# Patient Record
Sex: FEMALE | Race: WHITE | NOT HISPANIC OR LATINO | Employment: UNEMPLOYED | URBAN - METROPOLITAN AREA
[De-identification: names, ages, dates, MRNs, and addresses within clinical notes are randomized per-mention and may not be internally consistent; named-entity substitution may affect disease eponyms.]

---

## 2017-06-07 ENCOUNTER — HOSPITAL ENCOUNTER (EMERGENCY)
Facility: MEDICAL CENTER | Age: 22
End: 2017-06-07
Attending: EMERGENCY MEDICINE
Payer: MEDICAID

## 2017-06-07 VITALS
RESPIRATION RATE: 16 BRPM | TEMPERATURE: 98.4 F | SYSTOLIC BLOOD PRESSURE: 99 MMHG | BODY MASS INDEX: 20.62 KG/M2 | DIASTOLIC BLOOD PRESSURE: 62 MMHG | OXYGEN SATURATION: 96 % | WEIGHT: 123.9 LBS | HEART RATE: 68 BPM

## 2017-06-07 DIAGNOSIS — Z86.59 HISTORY OF DEPRESSION: ICD-10-CM

## 2017-06-07 DIAGNOSIS — S01.81XA FOREHEAD LACERATION, INITIAL ENCOUNTER: ICD-10-CM

## 2017-06-07 PROCEDURE — 99283 EMERGENCY DEPT VISIT LOW MDM: CPT

## 2017-06-07 PROCEDURE — 303353 HCHG DERMABOND SKIN ADHESIVE

## 2017-06-07 PROCEDURE — 304999 HCHG REPAIR-SIMPLE/INTERMED LEVEL 1

## 2017-06-07 ASSESSMENT — LIFESTYLE VARIABLES
ON A TYPICAL DAY WHEN YOU DRINK ALCOHOL HOW MANY DRINKS DO YOU HAVE: 2
EVER FELT BAD OR GUILTY ABOUT YOUR DRINKING: NO
EVER HAD A DRINK FIRST THING IN THE MORNING TO STEADY YOUR NERVES TO GET RID OF A HANGOVER: NO
TOTAL SCORE: 0
HAVE PEOPLE ANNOYED YOU BY CRITICIZING YOUR DRINKING: NO
TOTAL SCORE: 0
CONSUMPTION TOTAL: NEGATIVE
DO YOU DRINK ALCOHOL: YES
HAVE YOU EVER FELT YOU SHOULD CUT DOWN ON YOUR DRINKING: NO
AVERAGE NUMBER OF DAYS PER WEEK YOU HAVE A DRINK CONTAINING ALCOHOL: 2
HOW MANY TIMES IN THE PAST YEAR HAVE YOU HAD 5 OR MORE DRINKS IN A DAY: 0
TOTAL SCORE: 0

## 2017-06-07 ASSESSMENT — PAIN SCALES - GENERAL
PAINLEVEL_OUTOF10: 7
PAINLEVEL_OUTOF10: 7

## 2017-06-07 NOTE — ED AVS SNAPSHOT
Home Care Instructions                                                                                                                Zeina Cruz   MRN: [id number]    Department:  [facility], Emergency Dept   Date of Visit:  6/7/2017            [facility], Emergency Dept    [address]    [place] [address]    Phone:  [phone]      You were seen by     Clemente Herron M.D.      Your Diagnosis Was     Forehead laceration, initial encounter     S01.81XA       Follow-up Information     1. Follow up with SCOTTY Saha.    Specialty:  Family Medicine    Contact information    [address] [place] [address] [address]  [facility] [address] [phone]        Medication Information     Review all of your home medications and newly ordered medications with your primary doctor and/or pharmacist as soon as possible. Follow medication instructions as directed by your doctor and/or pharmacist.     Please keep your complete medication list with you and share with your physician. Update the information when medications are discontinued, doses are changed, or new medications (including over-the-counter products) are added; and carry medication information at all times in the event of emergency situations.               Medication List      Notice     You have not been prescribed any medications.              Discharge Instructions       Facial Laceration   A facial laceration is a cut on the face. These injuries can be painful and cause bleeding. Lacerations usually heal quickly, but they need special care to reduce scarring.  DIAGNOSIS   Your health care provider will take a medical history, ask for details about how the injury occurred, and examine the wound to determine how deep the cut is.  TREATMENT   Some facial lacerations may not require closure. Others may not be able to be closed because of an increased risk of infection. The risk of infection and the chance for successful  closure will depend on various factors, including the amount of time since the injury occurred.  The wound may be cleaned to help prevent infection. If closure is appropriate, pain medicines may be given if needed. Your health care provider will use stitches (sutures), wound glue (adhesive), or skin adhesive strips to repair the laceration. These tools bring the skin edges together to allow for faster healing and a better cosmetic outcome. If needed, you may also be given a tetanus shot.  HOME CARE INSTRUCTIONS  · Only take over-the-counter or prescription medicines as directed by your health care provider.  · Follow your health care provider's instructions for wound care. These instructions will vary depending on the technique used for closing the wound.  For Sutures:  · Keep the wound clean and dry.    · If you were given a bandage (dressing), you should change it at least once a day. Also change the dressing if it becomes wet or dirty, or as directed by your health care provider.    · Wash the wound with soap and water 2 times a day. Rinse the wound off with water to remove all soap. Pat the wound dry with a clean towel.    · After cleaning, apply a thin layer of the antibiotic ointment recommended by your health care provider. This will help prevent infection and keep the dressing from sticking.    · You may shower as usual after the first 24 hours. Do not soak the wound in water until the sutures are removed.    · Get your sutures removed as directed by your health care provider. With facial lacerations, sutures should usually be taken out after 4-5 days to avoid stitch marks.    · Wait a few days after your sutures are removed before applying any makeup.  For Skin Adhesive Strips:  · Keep the wound clean and dry.    · Do not get the skin adhesive strips wet. You may bathe carefully, using caution to keep the wound dry.    · If the wound gets wet, pat it dry with a clean towel.    · Skin adhesive strips will  fall off on their own. You may trim the strips as the wound heals. Do not remove skin adhesive strips that are still stuck to the wound. They will fall off in time.    For Wound Adhesive:  · You may briefly wet your wound in the shower or bath. Do not soak or scrub the wound. Do not swim. Avoid periods of heavy sweating until the skin adhesive has fallen off on its own. After showering or bathing, gently pat the wound dry with a clean towel.    · Do not apply liquid medicine, cream medicine, ointment medicine, or makeup to your wound while the skin adhesive is in place. This may loosen the film before your wound is healed.    · If a dressing is placed over the wound, be careful not to apply tape directly over the skin adhesive. This may cause the adhesive to be pulled off before the wound is healed.    · Avoid prolonged exposure to sunlight or tanning lamps while the skin adhesive is in place.  · The skin adhesive will usually remain in place for 5-10 days, then naturally fall off the skin. Do not pick at the adhesive film.    After Healing:  Once the wound has healed, cover the wound with sunscreen during the day for 1 full year. This can help minimize scarring. Exposure to ultraviolet light in the first year will darken the scar. It can take 1-2 years for the scar to lose its redness and to heal completely.   SEEK MEDICAL CARE IF:  · You have a fever.  SEEK IMMEDIATE MEDICAL CARE IF:  · You have redness, pain, or swelling around the wound.    · You see a yellowish-white fluid (pus) coming from the wound.       This information is not intended to replace advice given to you by your health care provider. Make sure you discuss any questions you have with your health care provider.     Document Released: 01/25/2006 Document Revised: 01/08/2016 Document Reviewed: 07/31/2014  Elsevier Interactive Patient Education ©2016 [employer]vier Inc[employer]            Patient Information     Patient Information    Following emergency  treatment: all patient requiring follow-up care must return either to a private physician or a clinic if your condition worsens before you are able to obtain further medical attention, please return to the emergency room.     Billing Information    At [facility], we work to make the billing process streamlined for our patients.  Our Representatives are here to answer any questions you may have regarding your hospital bill.  If you have insurance coverage and have supplied your insurance information to us, we will submit a claim to your insurer on your behalf.  Should you have any questions regarding your bill, we can be reached online or by phone as follows:  Online: You are able pay your bills online or live chat with our representatives about any billing questions you may have. We are here to help Monday - Friday from 8:00am to 7:30pm and 9:00am - 12:00pm on Saturdays.  Please visit [email or web address]  for more information.   Phone:  [phone] or [phone]    Please note that your emergency physician, surgeon, pathologist, radiologist, anesthesiologist, and other specialists are not employed by [facility] and will therefore bill separately for their services.  Please contact them directly for any questions concerning their bills at the numbers below:     Emergency Physician Services:  [phone]  [facility]:  [phone]  [facility]:  [phone]  [facility]:  [phone]    1. Your final bill may vary from the amount quoted upon discharge if all procedures are not complete at that time, or if your doctor has additional procedures of which we are not aware. You will receive an additional bill if you return to the Emergency Department at [facility] for suture removal regardless of the facility of which the sutures were placed.     2. Please arrange for settlement of this account at the emergency  registration.    3. All self-pay accounts are due in full at the time of treatment.  If you are unable to meet this obligation then payment is expected within 4-5 days.     4. If you have had radiology studies (CT, X-ray, Ultrasound, MRI), you have received a preliminary result during your emergency department visit. Please contact the radiology department [phone] to receive a copy of your final result. Please discuss the Final result with your primary physician or with the follow up physician provided.     Crisis Hotline:  [place] Crisis Hotline:  0-077-OQGJULF or 1-180.708.7905  [place] Crisis Hotline:    1-684.769.7624 or 649-648-6589         ED Discharge Follow Up Questions    1. In order to provide you with very good care, we would like to follow up with a phone call in the next few days.  May we have your permission to contact you?     YES /  NO    2. What is the best phone number to call you? (       )_____-__________    3. What is the best time to call you?      Morning  /  Afternoon  /  Evening                   Patient Signature:  ____________________________________________________________    Date:  ____________________________________________________________

## 2017-06-07 NOTE — DISCHARGE INSTRUCTIONS
Facial Laceration   A facial laceration is a cut on the face. These injuries can be painful and cause bleeding. Lacerations usually heal quickly, but they need special care to reduce scarring.  DIAGNOSIS   Your health care provider will take a medical history, ask for details about how the injury occurred, and examine the wound to determine how deep the cut is.  TREATMENT   Some facial lacerations may not require closure. Others may not be able to be closed because of an increased risk of infection. The risk of infection and the chance for successful closure will depend on various factors, including the amount of time since the injury occurred.  The wound may be cleaned to help prevent infection. If closure is appropriate, pain medicines may be given if needed. Your health care provider will use stitches (sutures), wound glue (adhesive), or skin adhesive strips to repair the laceration. These tools bring the skin edges together to allow for faster healing and a better cosmetic outcome. If needed, you may also be given a tetanus shot.  HOME CARE INSTRUCTIONS  · Only take over-the-counter or prescription medicines as directed by your health care provider.  · Follow your health care provider's instructions for wound care. These instructions will vary depending on the technique used for closing the wound.  For Sutures:  · Keep the wound clean and dry.    · If you were given a bandage (dressing), you should change it at least once a day. Also change the dressing if it becomes wet or dirty, or as directed by your health care provider.    · Wash the wound with soap and water 2 times a day. Rinse the wound off with water to remove all soap. Pat the wound dry with a clean towel.    · After cleaning, apply a thin layer of the antibiotic ointment recommended by your health care provider. This will help prevent infection and keep the dressing from sticking.    · You may shower as usual after the first 24 hours. Do not soak the  wound in water until the sutures are removed.    · Get your sutures removed as directed by your health care provider. With facial lacerations, sutures should usually be taken out after 4-5 days to avoid stitch marks.    · Wait a few days after your sutures are removed before applying any makeup.  For Skin Adhesive Strips:  · Keep the wound clean and dry.    · Do not get the skin adhesive strips wet. You may bathe carefully, using caution to keep the wound dry.    · If the wound gets wet, pat it dry with a clean towel.    · Skin adhesive strips will fall off on their own. You may trim the strips as the wound heals. Do not remove skin adhesive strips that are still stuck to the wound. They will fall off in time.    For Wound Adhesive:  · You may briefly wet your wound in the shower or bath. Do not soak or scrub the wound. Do not swim. Avoid periods of heavy sweating until the skin adhesive has fallen off on its own. After showering or bathing, gently pat the wound dry with a clean towel.    · Do not apply liquid medicine, cream medicine, ointment medicine, or makeup to your wound while the skin adhesive is in place. This may loosen the film before your wound is healed.    · If a dressing is placed over the wound, be careful not to apply tape directly over the skin adhesive. This may cause the adhesive to be pulled off before the wound is healed.    · Avoid prolonged exposure to sunlight or tanning lamps while the skin adhesive is in place.  · The skin adhesive will usually remain in place for 5-10 days, then naturally fall off the skin. Do not pick at the adhesive film.    After Healing:  Once the wound has healed, cover the wound with sunscreen during the day for 1 full year. This can help minimize scarring. Exposure to ultraviolet light in the first year will darken the scar. It can take 1-2 years for the scar to lose its redness and to heal completely.   SEEK MEDICAL CARE IF:  · You have a fever.  SEEK IMMEDIATE  MEDICAL CARE IF:  · You have redness, pain, or swelling around the wound.    · You see a yellowish-white fluid (pus) coming from the wound.       This information is not intended to replace advice given to you by your health care provider. Make sure you discuss any questions you have with your health care provider.     Document Released: 01/25/2006 Document Revised: 01/08/2016 Document Reviewed: 07/31/2014  [employer] Interactive Patient Education ©2016 [employer]vier Inc.

## 2017-06-07 NOTE — ED AVS SNAPSHOT
6/7/2017    Zeina Crzu  [address]  [place] [facility] [address]    Dear Zeina:    [facility] wants to ensure your discharge home is safe and you or your loved ones have had all of your questions answered regarding your care after you leave the hospital.    Below is a list of resources and contact information should you have any questions regarding your hospital stay, follow-up instructions, or active medical symptoms.    Questions or Concerns Regarding… Contact   Medical Questions Related to Your Discharge  (7 days a week, 8am-5pm) Contact a Nurse Care Coordinator   [phone]   Medical Questions Not Related to Your Discharge  (24 hours a day / 7 days a week)  Contact the Nurse Health Line   [phone]    Medications or Discharge Instructions Refer to your discharge packet   or contact your [facility] Primary Care Provider   [phone]   Follow-up Appointment(s) Schedule your appointment via TuneIn   or contact Scheduling [phone]   Billing Review your statement via TuneIn  or contact Billing [phone]   Medical Records Review your records via TuneIn   or contact Medical Records [phone]     You may receive a telephone call within two days of discharge. This call is to make certain you understand your discharge instructions and have the opportunity to have any questions answered. You can also easily access your medical information, test results and upcoming appointments via the TuneIn free online health management tool. You can learn more and sign up at [email or web address]. For assistance setting up your TuneIn account, please call [phone].    Once again, we want to ensure your discharge home is safe and that you have a clear understanding of any next steps in your care. If you have any questions or concerns, please do not hesitate to contact us, we are here for you. Thank you for choosing [facility] for your healthcare needs.    Sincerely,    Your [facility] Healthcare Team
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
## 2017-06-07 NOTE — ED NOTES
Sling placed.    Discharge instructions reviewed.  Pt verbalized understanding and denies questions.  VS stable.  NAD noted.  Respirations even and unlabored.  Steady gait noted upon ED exit.
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
## 2017-06-07 NOTE — ED AVS SNAPSHOT
Simpleshow Access Code: Activation code not generated  Current Simpleshow Status: Active    [employer]  A secure, online tool to manage your health information     [employer]’s Simpleshow® is a secure, online tool that connects you to your personalized health information from the privacy of your home -- day or night - making it very easy for you to manage your healthcare. Once the activation process is completed, you can even access your medical information using the Simpleshow adriel, which is available for free in the Apple Adriel store or Google Play store.     Simpleshow provides the following levels of access (as shown below):   My Chart Features   [facility] Primary Care Doctor [facility]  Specialists [facility]  Urgent  Care Non-[facility]  Primary Care  Doctor   Email your healthcare team securely and privately 24/7 X X X X   Manage appointments: schedule your next appointment; view details of past/upcoming appointments X      Request prescription refills. X      View recent personal medical records, including lab and immunizations X X X X   View health record, including health history, allergies, medications X X X X   Read reports about your outpatient visits, procedures, consult and ER notes X X X X   See your discharge summary, which is a recap of your hospital and/or ER visit that includes your diagnosis, lab results, and care plan. X X       How to register for Simpleshow:  1. Go to  [email or web address].  2. Click on the Sign Up Now box, which takes you to the New Member Sign Up page. You will need to provide the following information:  a. Enter your Simpleshow Access Code exactly as it appears at the top of this page. (You will not need to use this code after you’ve completed the sign-up process. If you do not sign up before the expiration date, you must request a new code.)   b. Enter your date of birth.   c. Enter your home email address.   d. Click Submit, and follow the next screen’s instructions.  3. Create a Simpleshow ID. This will  be your Interwise login ID and cannot be changed, so think of one that is secure and easy to remember.  4. Create a Interwise password. You can change your password at any time.  5. Enter your Password Reset Question and Answer. This can be used at a later time if you forget your password.   6. Enter your e-mail address. This allows you to receive e-mail notifications when new information is available in Interwise.  7. Click Sign Up. You can now view your health information.    For assistance activating your Interwise account, call [phone]

## 2017-06-14 ENCOUNTER — HOSPITAL ENCOUNTER (OUTPATIENT)
Dept: LAB | Facility: MEDICAL CENTER | Age: 22
End: 2017-06-14
Attending: SPECIALIST
Payer: MEDICAID

## 2017-06-14 PROCEDURE — 88175 CYTOPATH C/V AUTO FLUID REDO: CPT

## 2017-06-14 PROCEDURE — 87624 HPV HI-RISK TYP POOLED RSLT: CPT

## 2017-06-14 PROCEDURE — 87491 CHLMYD TRACH DNA AMP PROBE: CPT

## 2017-06-14 PROCEDURE — 87591 N.GONORRHOEAE DNA AMP PROB: CPT

## 2017-06-16 LAB
C TRACH DNA GENITAL QL NAA+PROBE: NEGATIVE
CYTOLOGY REG CYTOL: ABNORMAL
HPV HR 12 DNA CVX QL NAA+PROBE: POSITIVE
HPV16 DNA SPEC QL NAA+PROBE: NEGATIVE
HPV18 DNA SPEC QL NAA+PROBE: NEGATIVE
N GONORRHOEA DNA GENITAL QL NAA+PROBE: NEGATIVE
SPECIMEN SOURCE: ABNORMAL
SPECIMEN SOURCE: ABNORMAL

## 2017-08-04 ENCOUNTER — HOSPITAL ENCOUNTER (EMERGENCY)
Facility: MEDICAL CENTER | Age: 22
End: 2017-08-04
Payer: MEDICAID

## 2017-08-04 VITALS
TEMPERATURE: 99.1 F | OXYGEN SATURATION: 100 % | HEART RATE: 119 BPM | SYSTOLIC BLOOD PRESSURE: 125 MMHG | BODY MASS INDEX: 19.58 KG/M2 | WEIGHT: 117.5 LBS | DIASTOLIC BLOOD PRESSURE: 89 MMHG | RESPIRATION RATE: 14 BRPM | HEIGHT: 65 IN

## 2017-08-04 PROCEDURE — 302449 STATCHG TRIAGE ONLY (STATISTIC)

## 2017-08-04 ASSESSMENT — PAIN SCALES - GENERAL: PAINLEVEL_OUTOF10: 7

## 2017-08-05 NOTE — ED NOTES
.  Chief Complaint   Patient presents with   • Detox     Patient went to [facility] for detox from Heroin. Patient stating that [facility] assured that she would have a bed once she arrived there from traveling from [facility].  Once she arrived, she states that [facility] stated that she no longer had a bed. Patient states that she is detoxing, nausea, rambling speech.     Alert team notified.  Last heroin use at 0700 this morning.

## 2017-08-05 NOTE — DISCHARGE PLANNING
Alert team note:  Patient reports she is looking to get into [facility] but they would not take her tonight.  She has been to [facility] twice once for 13 days and second time for 6 days.  Both times she walked out so she can not go there any more.  She has talked to [facility] also and she is not detoxing hard enough to go there if they take her insurance.  She will try [facility] again in the morning.  Provided resources for support, counseling and sobriety services.  She choose to leave, she did not want to wait to see a doctor.

## 2017-12-12 ENCOUNTER — HOSPITAL ENCOUNTER (EMERGENCY)
Facility: MEDICAL CENTER | Age: 22
End: 2017-12-12
Attending: EMERGENCY MEDICINE
Payer: MEDICAID

## 2017-12-12 VITALS
RESPIRATION RATE: 14 BRPM | SYSTOLIC BLOOD PRESSURE: 103 MMHG | BODY MASS INDEX: 19.76 KG/M2 | OXYGEN SATURATION: 96 % | TEMPERATURE: 98.1 F | HEART RATE: 101 BPM | DIASTOLIC BLOOD PRESSURE: 78 MMHG | WEIGHT: 118.61 LBS | HEIGHT: 65 IN

## 2017-12-12 DIAGNOSIS — K08.89 DENTALGIA: ICD-10-CM

## 2017-12-12 DIAGNOSIS — K02.9 DENTAL CARIES: ICD-10-CM

## 2017-12-12 PROCEDURE — 99283 EMERGENCY DEPT VISIT LOW MDM: CPT

## 2017-12-12 RX ORDER — OXYCODONE HYDROCHLORIDE AND ACETAMINOPHEN 5; 325 MG/1; MG/1
1-2 TABLET ORAL EVERY 4 HOURS PRN
Qty: 20 TAB | Refills: 0 | Status: SHIPPED | OUTPATIENT
Start: 2017-12-12 | End: 2017-12-17

## 2017-12-12 RX ORDER — ACETAMINOPHEN 325 MG/1
975 TABLET ORAL ONCE
Status: DISCONTINUED | OUTPATIENT
Start: 2017-12-12 | End: 2017-12-12 | Stop reason: HOSPADM

## 2017-12-12 RX ORDER — PENICILLIN V POTASSIUM 500 MG/1
500 TABLET ORAL
Qty: 28 TAB | Refills: 0 | Status: SHIPPED | OUTPATIENT
Start: 2017-12-12 | End: 2017-12-17

## 2017-12-12 RX ORDER — IBUPROFEN 600 MG/1
600 TABLET ORAL ONCE
Status: DISCONTINUED | OUTPATIENT
Start: 2017-12-12 | End: 2017-12-12 | Stop reason: HOSPADM

## 2017-12-12 ASSESSMENT — PAIN SCALES - GENERAL: PAINLEVEL_OUTOF10: 9

## 2017-12-12 NOTE — ED NOTES
Pt ambulates to triage  Chief Complaint   Patient presents with   • Dental Pain   pt reports fever at home, afebrile in triage, several sores in mouth, some draining pus, recently stopped using heroin and moved back her from [place].  Pt asked to wait in lobby, pt updated on triage process and pt asked to inform RN of any changes.

## 2017-12-12 NOTE — ED NOTES
Pt refused tylenol and ibuprofin. Pt verbalizes understanding of discharge instructions. Patient ambulatory to discharge with steady gait. NAD or deficits noted at time of discharge. S.O in tow
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
## 2017-12-12 NOTE — ED NOTES
Pt has significant decay and abcesses and long standing dental issues. Patient saw a dentist in [place] who wanted to remove her teeth. Pt cannot afford dentures at this time

## 2017-12-17 ENCOUNTER — APPOINTMENT (OUTPATIENT)
Dept: RADIOLOGY | Facility: MEDICAL CENTER | Age: 22
End: 2017-12-17
Attending: EMERGENCY MEDICINE
Payer: MEDICAID

## 2017-12-17 ENCOUNTER — HOSPITAL ENCOUNTER (EMERGENCY)
Facility: MEDICAL CENTER | Age: 22
End: 2017-12-17
Attending: EMERGENCY MEDICINE
Payer: MEDICAID

## 2017-12-17 VITALS
BODY MASS INDEX: 19.16 KG/M2 | SYSTOLIC BLOOD PRESSURE: 114 MMHG | DIASTOLIC BLOOD PRESSURE: 66 MMHG | HEIGHT: 65 IN | WEIGHT: 115 LBS | TEMPERATURE: 98.1 F | OXYGEN SATURATION: 97 % | HEART RATE: 80 BPM | RESPIRATION RATE: 16 BRPM

## 2017-12-17 DIAGNOSIS — T14.8XXA SUPERFICIAL LACERATION: ICD-10-CM

## 2017-12-17 DIAGNOSIS — K04.7 DENTAL INFECTION: ICD-10-CM

## 2017-12-17 DIAGNOSIS — S06.0X1A CONCUSSION WITH LOSS OF CONSCIOUSNESS OF 30 MINUTES OR LESS, INITIAL ENCOUNTER: ICD-10-CM

## 2017-12-17 DIAGNOSIS — F31.9 BIPOLAR AFFECTIVE DISORDER, REMISSION STATUS UNSPECIFIED (HCC): ICD-10-CM

## 2017-12-17 DIAGNOSIS — F19.10 POLYSUBSTANCE ABUSE (HCC): ICD-10-CM

## 2017-12-17 DIAGNOSIS — Z72.0 TOBACCO ABUSE: ICD-10-CM
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
## 2017-12-17 LAB — HCG SERPL QL: NEGATIVE

## 2017-12-17 PROCEDURE — 70486 CT MAXILLOFACIAL W/O DYE: CPT

## 2017-12-17 PROCEDURE — A9270 NON-COVERED ITEM OR SERVICE: HCPCS | Performed by: EMERGENCY MEDICINE

## 2017-12-17 PROCEDURE — 70450 CT HEAD/BRAIN W/O DYE: CPT

## 2017-12-17 PROCEDURE — 36415 COLL VENOUS BLD VENIPUNCTURE: CPT

## 2017-12-17 PROCEDURE — 700102 HCHG RX REV CODE 250 W/ 637 OVERRIDE(OP): Performed by: EMERGENCY MEDICINE

## 2017-12-17 PROCEDURE — 99285 EMERGENCY DEPT VISIT HI MDM: CPT

## 2017-12-17 PROCEDURE — 84703 CHORIONIC GONADOTROPIN ASSAY: CPT

## 2017-12-17 RX ORDER — NAPROXEN 375 MG/1
375 TABLET ORAL
Qty: 10 TAB | Refills: 0 | Status: SHIPPED | OUTPATIENT
Start: 2017-12-17 | End: 2018-06-06

## 2017-12-17 RX ORDER — OXYCODONE HYDROCHLORIDE 5 MG/1
10 TABLET ORAL ONCE
Status: COMPLETED | OUTPATIENT
Start: 2017-12-17 | End: 2017-12-17

## 2017-12-17 RX ORDER — NAPROXEN 375 MG/1
375 TABLET ORAL
Qty: 10 TAB | Refills: 0 | Status: SHIPPED | OUTPATIENT
Start: 2017-12-17 | End: 2017-12-17

## 2017-12-17 RX ORDER — PENICILLIN V POTASSIUM 500 MG/1
500 TABLET ORAL 4 TIMES DAILY
Qty: 28 TAB | Refills: 0 | Status: SHIPPED | OUTPATIENT
Start: 2017-12-17 | End: 2017-12-17

## 2017-12-17 RX ORDER — ACETAMINOPHEN 325 MG/1
650 TABLET ORAL ONCE
Status: COMPLETED | OUTPATIENT
Start: 2017-12-17 | End: 2017-12-17

## 2017-12-17 RX ORDER — PENICILLIN V POTASSIUM 500 MG/1
500 TABLET ORAL 4 TIMES DAILY
Qty: 28 TAB | Refills: 0 | Status: SHIPPED | OUTPATIENT
Start: 2017-12-17 | End: 2017-12-24

## 2017-12-17 RX ORDER — IBUPROFEN 600 MG/1
600 TABLET ORAL ONCE
Status: COMPLETED | OUTPATIENT
Start: 2017-12-17 | End: 2017-12-17

## 2017-12-17 RX ADMIN — IBUPROFEN 600 MG: 600 TABLET, FILM COATED ORAL at 16:24

## 2017-12-17 RX ADMIN — OXYCODONE HYDROCHLORIDE 10 MG: 5 TABLET ORAL at 19:42

## 2017-12-17 RX ADMIN — ACETAMINOPHEN 650 MG: 325 TABLET, FILM COATED ORAL at 14:54

## 2017-12-17 ASSESSMENT — PAIN SCALES - GENERAL
PAINLEVEL_OUTOF10: 10
PAINLEVEL_OUTOF10: 8
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
## 2017-12-17 NOTE — ED NOTES
"Pt aware of need for urine sample. States she cannot void, requesting food and drink.  Given sandwich box and extra juice with ERP okay.  Pt upset that \"only tylenol\" ordered for pain \"and that won't do anything.  I'm not a junkie but I am in pain\".   Pt aware discussed with ERP.  Pt reports last tetanus < 2 years.  "
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
## 2017-12-18 NOTE — ED NOTES
Pt incontinent of stool and urine in the gurney.  Pt up to the BR with steady gait.  Pt provided wet wash cloths, a patient belongings bag and disposable underwear.
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
## 2017-12-18 NOTE — ED NOTES
Spoke with  Reyna on telephone.  Reyna reports that she will arrange for a Taxi Cab ride to a hotel for the night.
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
## 2017-12-18 NOTE — ED NOTES
"Rounded on patient.  New ERP [place] updated on patient status.  Pt reports she is still unable to void.  Pt reports heroin withdrawls, shaking and anxiety.  Pt reports she uses \"heroin hourly while awake\" and leaves \"1 needle next to the bed to dose in the middle of the night\".   "

## 2017-12-18 NOTE — ED NOTES
IV d/c'd cath intact; no redness, no swelling noted; drsg applied.  D/C home with written and verbal instructions re: Rx, activity, f/u.  Verbalizes understanding.  Pt requesting food and apple juice prior to d/c.

## 2018-03-30 ENCOUNTER — APPOINTMENT (OUTPATIENT)
Dept: RADIOLOGY | Facility: MEDICAL CENTER | Age: 23
End: 2018-03-30
Attending: EMERGENCY MEDICINE
Payer: MEDICAID

## 2018-03-30 ENCOUNTER — HOSPITAL ENCOUNTER (EMERGENCY)
Facility: MEDICAL CENTER | Age: 23
End: 2018-03-30
Attending: EMERGENCY MEDICINE
Payer: MEDICAID

## 2018-03-30 VITALS
BODY MASS INDEX: 18.16 KG/M2 | WEIGHT: 109 LBS | HEIGHT: 65 IN | HEART RATE: 71 BPM | OXYGEN SATURATION: 95 % | RESPIRATION RATE: 16 BRPM | TEMPERATURE: 98.2 F

## 2018-03-30 DIAGNOSIS — R91.8 OPACITY OF LUNG ON IMAGING STUDY: ICD-10-CM

## 2018-03-30 DIAGNOSIS — F19.90 IVDU (INTRAVENOUS DRUG USER): ICD-10-CM

## 2018-03-30 DIAGNOSIS — R42 DIZZINESS: ICD-10-CM

## 2018-03-30 LAB
ALBUMIN SERPL BCP-MCNC: 3.5 G/DL (ref 3.2–4.9)
ALBUMIN/GLOB SERPL: 1.1 G/DL
ALP SERPL-CCNC: 86 U/L (ref 30–99)
ALT SERPL-CCNC: 47 U/L (ref 2–50)
AMPHET UR QL SCN: POSITIVE
ANION GAP SERPL CALC-SCNC: 8 MMOL/L (ref 0–11.9)
APPEARANCE UR: CLEAR
AST SERPL-CCNC: 32 U/L (ref 12–45)
BACTERIA #/AREA URNS HPF: ABNORMAL /HPF
BARBITURATES UR QL SCN: NEGATIVE
BASOPHILS # BLD AUTO: 0.3 % (ref 0–1.8)
BASOPHILS # BLD: 0.02 K/UL (ref 0–0.12)
BENZODIAZ UR QL SCN: NEGATIVE
BILIRUB SERPL-MCNC: 0.5 MG/DL (ref 0.1–1.5)
BILIRUB UR QL STRIP.AUTO: NEGATIVE
BNP SERPL-MCNC: 2 PG/ML (ref 0–100)
BUN SERPL-MCNC: 13 MG/DL (ref 8–22)
BZE UR QL SCN: NEGATIVE
CALCIUM SERPL-MCNC: 8.8 MG/DL (ref 8.5–10.5)
CANNABINOIDS UR QL SCN: NEGATIVE
CHLORIDE SERPL-SCNC: 104 MMOL/L (ref 96–112)
CO2 SERPL-SCNC: 27 MMOL/L (ref 20–33)
COLOR UR: YELLOW
CREAT SERPL-MCNC: 0.53 MG/DL (ref 0.5–1.4)
CULTURE IF INDICATED INDCX: YES UA CULTURE
EKG IMPRESSION: NORMAL
EOSINOPHIL # BLD AUTO: 0.22 K/UL (ref 0–0.51)
EOSINOPHIL NFR BLD: 2.9 % (ref 0–6.9)
EPI CELLS #/AREA URNS HPF: ABNORMAL /HPF
ERYTHROCYTE [DISTWIDTH] IN BLOOD BY AUTOMATED COUNT: 42.5 FL (ref 35.9–50)
GLOBULIN SER CALC-MCNC: 3.2 G/DL (ref 1.9–3.5)
GLUCOSE SERPL-MCNC: 79 MG/DL (ref 65–99)
GLUCOSE UR STRIP.AUTO-MCNC: NEGATIVE MG/DL
HCG UR QL: NEGATIVE
HCT VFR BLD AUTO: 34.8 % (ref 37–47)
HGB BLD-MCNC: 11.7 G/DL (ref 12–16)
HYALINE CASTS #/AREA URNS LPF: ABNORMAL /LPF
IMM GRANULOCYTES # BLD AUTO: 0.02 K/UL (ref 0–0.11)
IMM GRANULOCYTES NFR BLD AUTO: 0.3 % (ref 0–0.9)
KETONES UR STRIP.AUTO-MCNC: NEGATIVE MG/DL
LEUKOCYTE ESTERASE UR QL STRIP.AUTO: ABNORMAL
LYMPHOCYTES # BLD AUTO: 3.49 K/UL (ref 1–4.8)
LYMPHOCYTES NFR BLD: 45.4 % (ref 22–41)
MAGNESIUM SERPL-MCNC: 2 MG/DL (ref 1.5–2.5)
MCH RBC QN AUTO: 30 PG (ref 27–33)
MCHC RBC AUTO-ENTMCNC: 33.6 G/DL (ref 33.6–35)
MCV RBC AUTO: 89.2 FL (ref 81.4–97.8)
METHADONE UR QL SCN: NEGATIVE
MICRO URNS: ABNORMAL
MONOCYTES # BLD AUTO: 0.56 K/UL (ref 0–0.85)
MONOCYTES NFR BLD AUTO: 7.3 % (ref 0–13.4)
NEUTROPHILS # BLD AUTO: 3.37 K/UL (ref 2–7.15)
NEUTROPHILS NFR BLD: 43.8 % (ref 44–72)
NITRITE UR QL STRIP.AUTO: NEGATIVE
NRBC # BLD AUTO: 0 K/UL
NRBC BLD-RTO: 0 /100 WBC
OPIATES UR QL SCN: POSITIVE
OXYCODONE UR QL SCN: NEGATIVE
PCP UR QL SCN: NEGATIVE
PH UR STRIP.AUTO: 6 [PH]
PLATELET # BLD AUTO: 295 K/UL (ref 164–446)
PMV BLD AUTO: 8.4 FL (ref 9–12.9)
POTASSIUM SERPL-SCNC: 3.8 MMOL/L (ref 3.6–5.5)
PROPOXYPH UR QL SCN: NEGATIVE
PROT SERPL-MCNC: 6.7 G/DL (ref 6–8.2)
PROT UR QL STRIP: NEGATIVE MG/DL
RBC # BLD AUTO: 3.9 M/UL (ref 4.2–5.4)
RBC # URNS HPF: ABNORMAL /HPF
RBC UR QL AUTO: NEGATIVE
SODIUM SERPL-SCNC: 139 MMOL/L (ref 135–145)
SP GR UR REFRACTOMETRY: 1.02
SP GR UR STRIP.AUTO: 1.02
TROPONIN I SERPL-MCNC: <0.01 NG/ML (ref 0–0.04)
UROBILINOGEN UR STRIP.AUTO-MCNC: 0.2 MG/DL
WBC # BLD AUTO: 7.7 K/UL (ref 4.8–10.8)
WBC #/AREA URNS HPF: ABNORMAL /HPF

## 2018-03-30 PROCEDURE — 94760 N-INVAS EAR/PLS OXIMETRY 1: CPT

## 2018-03-30 PROCEDURE — 81001 URINALYSIS AUTO W/SCOPE: CPT

## 2018-03-30 PROCEDURE — 99284 EMERGENCY DEPT VISIT MOD MDM: CPT

## 2018-03-30 PROCEDURE — 87086 URINE CULTURE/COLONY COUNT: CPT

## 2018-03-30 PROCEDURE — 85025 COMPLETE CBC W/AUTO DIFF WBC: CPT

## 2018-03-30 PROCEDURE — 83735 ASSAY OF MAGNESIUM: CPT

## 2018-03-30 PROCEDURE — 81025 URINE PREGNANCY TEST: CPT

## 2018-03-30 PROCEDURE — 93005 ELECTROCARDIOGRAM TRACING: CPT | Performed by: EMERGENCY MEDICINE

## 2018-03-30 PROCEDURE — 83880 ASSAY OF NATRIURETIC PEPTIDE: CPT

## 2018-03-30 PROCEDURE — 36415 COLL VENOUS BLD VENIPUNCTURE: CPT

## 2018-03-30 PROCEDURE — 80053 COMPREHEN METABOLIC PANEL: CPT

## 2018-03-30 PROCEDURE — 80307 DRUG TEST PRSMV CHEM ANLYZR: CPT

## 2018-03-30 PROCEDURE — 71046 X-RAY EXAM CHEST 2 VIEWS: CPT

## 2018-03-30 PROCEDURE — 84484 ASSAY OF TROPONIN QUANT: CPT

## 2018-03-30 RX ORDER — LEVOFLOXACIN 750 MG/1
750 TABLET, FILM COATED ORAL DAILY
Qty: 7 TAB | Refills: 0 | Status: SHIPPED | OUTPATIENT
Start: 2018-03-30 | End: 2018-04-06

## 2018-03-30 ASSESSMENT — PAIN SCALES - GENERAL: PAINLEVEL_OUTOF10: 0

## 2018-03-30 NOTE — ED NOTES
Pt asking for more food. Nurse asked her to go to restroom first. Pt obtained urine specimen. Snack given again to pt. Updated on plan of care, waiting on urine results prior to discharge. Pt expressed understanding.

## 2018-03-30 NOTE — ED NOTES
Discharge instructions reviewed. Prescriptions discussed and given. Pt expressed understanding, no other questions at this time.
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
## 2018-03-30 NOTE — ED NOTES
Pt asking for something to eat. PO challenge ordered, nurse gave pt snack. Tolerating well without incident.
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
## 2018-03-30 NOTE — DISCHARGE INSTRUCTIONS
Dizziness  Dizziness is a common problem. It is a feeling of unsteadiness or light-headedness. You may feel like you are about to faint. Dizziness can lead to injury if you stumble or fall. Anyone can become dizzy, but dizziness is more common in older adults. This condition can be caused by a number of things, including medicines, dehydration, or illness.  Follow these instructions at home:  Taking these steps may help with your condition:  Eating and drinking  · Drink enough fluid to keep your urine clear or pale yellow. This helps to keep you from becoming dehydrated. Try to drink more clear fluids, such as water.  · Do not drink alcohol.  · Limit your caffeine intake if directed by your health care provider.  · Limit your salt intake if directed by your health care provider.  Activity  · Avoid making quick movements.  ¨ Rise slowly from chairs and steady yourself until you feel okay.  ¨ In the morning, first sit up on the side of the bed. When you feel okay, stand slowly while you hold onto something until you know that your balance is fine.  · Move your legs often if you need to  one place for a long time. Tighten and relax your muscles in your legs while you are standing.  · Do not drive or operate heavy machinery if you feel dizzy.  · Avoid bending down if you feel dizzy. Place items in your home so that they are easy for you to reach without leaning over.  Lifestyle  · Do not use any tobacco products, including cigarettes, chewing tobacco, or electronic cigarettes. If you need help quitting, ask your health care provider.  · Try to reduce your stress level, such as with yoga or meditation. Talk with your health care provider if you need help.  General instructions  · Watch your dizziness for any changes.  · Take medicines only as directed by your health care provider. Talk with your health care provider if you think that your dizziness is caused by a medicine that you are taking.  · Tell a friend or  a family member that you are feeling dizzy. If he or she notices any changes in your behavior, have this person call your health care provider.  · Keep all follow-up visits as directed by your health care provider. This is important.  Contact a health care provider if:  · Your dizziness does not go away.  · Your dizziness or light-headedness gets worse.  · You feel nauseous.  · You have reduced hearing.  · You have new symptoms.  · You are unsteady on your feet or you feel like the room is spinning.  Get help right away if:  · You vomit or have diarrhea and are unable to eat or drink anything.  · You have problems talking, walking, swallowing, or using your arms, hands, or legs.  · You feel generally weak.  · You are not thinking clearly or you have trouble forming sentences. It may take a friend or family member to notice this.  · You have chest pain, abdominal pain, shortness of breath, or sweating.  · Your vision changes.  · You notice any bleeding.  · You have a headache.  · You have neck pain or a stiff neck.  · You have a fever.  This information is not intended to replace advice given to you by your health care provider. Make sure you discuss any questions you have with your health care provider.  Document Released: 06/13/2002 Document Revised: 05/25/2017 Document Reviewed: 12/14/2015  [employer] Patient Education © 2017 [employer]    Drug Abuse, Frequently Asked Questions  Drug addiction is a complex brain disease. It is characterized by compulsive, at times uncontrollable, drug craving, seeking, and use that persists even in the face of extremely negative results. Drug seeking becomes compulsive, in large part as a result of the effects of prolonged drug use on brain functioning and, thus, on behavior. For many people, drug addiction becomes chronic, with relapses possible even after long periods of being off the drug.  HOW QUICKLY CAN I BECOME ADDICTED TO A DRUG?  There is no easy answer to  this. If and how quickly you might become addicted to a drug depends on many factors including the biology of your body. All drugs are potentially harmful and may have life-threatening consequences associated with their use. There are also vast differences among individuals in sensitivity to various drugs. While one person may use a drug many times and suffer no ill effects, another person may be particularly vulnerable and overdose or developing a craving with the first use. There is no way of knowing in advance how someone may react.  HOW DO I KNOW IF SOMEONE IS ADDICTED TO DRUGS?  If a person is compulsively seeking and using a drug despite negative consequences (such as loss of job, debt, physical problems brought on by drug abuse, or family problems) then he or she is probably addicted. Those who screen for drug problems, such as physicians, have developed the CAGE questionnaire. These four simple questions can help detect substance abuse problems:  · Have you ever felt you ought to Cut down on your drinking/drug use?   · Have people ever Annoyed you by criticizing your drinking/drug use?   · Have you ever felt bad or Guilty about your drinking/drug use?   · Have you ever had a drink or taken a drug first thing in the morning to steady your nerves or get rid of a hangover (Eye-opener)?   WHAT ARE THE PHYSICAL SIGNS OF ABUSE OR ADDICTION?  The physical signs of abuse or addiction can vary depending on the person and the drug being abused. For example, someone who abuses marijuana may have a chronic cough or worsening of asthmatic conditions. THC, the chemical in marijuana responsible for producing its effects, is associated with weakening the immune system which makes the user more vulnerable to infections, such as pneumonia. Each drug has short-term and long-term physical effects. Stimulants like cocaine increase heart rate and blood pressure, whereas opioids like heroin may slow the heart rate and reduce  "breathing (respiration).   ARE THERE EFFECTIVE TREATMENTS FOR DRUG ADDICTION?  Drug addiction can be effectively treated with behavioral-based therapies and, for addiction to some drugs such as heroin or nicotine, medications may be used. Treatment may vary for each person depending on the type of drug(s) being used and multiple courses of treatment may be needed to achieve success. Research has revealed 13 basic principles that underlie effective drug addiction treatment. These are discussed in [employer]'s Principles of Drug Addiction Treatment: A Research-Based Guide.  WHERE CAN I FIND INFORMATION ABOUT DRUG TREATMENT PROGRAMS?  · For referrals to treatment programs, visit the Substance Abuse and Mental Health Services Administration online at http://findtreatment.[facility].gov/.   · [employer] publishes an expanding series of treatment manuals, the \"clinical toolbox,\" that gives drug treatment providers research-based information for creating effective treatment programs.   WHAT IS DETOXIFICATION, OR \"DETOX\"?  Detoxification is the process of allowing the body to rid itself of a drug while managing the symptoms of withdrawal. It is often the first step in a drug treatment program and should be followed by treatment with a behavioral-based therapy and/or a medication, if available. Detox alone with no follow-up is not treatment.   WHAT IS WITHDRAWAL? HOW LONG DOES IT LAST?  Withdrawal is the variety of symptoms that occur after use of some addictive drugs is reduced or stopped. Length of withdrawal and symptoms vary with the type of drug. For example, physical symptoms of heroin withdrawal may include restlessness, muscle and bone pain, insomnia, diarrhea, vomiting, and cold flashes. These physical symptoms may last for several days, but the general depression, or dysphoria (opposite of euphoria), that often accompanies heroin withdrawal, may last for weeks. In many cases withdrawal can be easily treated with medications to " ease the symptoms. But treating withdrawal is not the same as treating addiction.   WHAT ARE THE COSTS OF DRUG ABUSE TO SOCIETY?  Beyond the raw numbers are other costs to society:  · Spread of infectious diseases such as HIV/AIDS and hepatitis C either through sharing of drug paraphernalia or unprotected sex.   · Deaths due to overdose or other complications from drug use.   · Effects on unborn children of pregnant drug users.   · Other effects such as crime and homelessness.   IF A PREGNANT WOMAN ABUSES DRUGS, DOES IT AFFECT THE FETUS?  · Many substances including alcohol, nicotine, and drugs of abuse can have negative effects on the developing fetus because they are transferred to the fetus across the placenta. For example, nicotine has been connected with premature birth and low birth weight, as has the use of cocaine. Scientific studies have shown that babies born to marijuana users were shorter, weighed less, and had smaller head sizes than those born to mothers who did not use the drug. Smaller babies are more likely to develop health problems.   · Whether a baby's health problems, if caused by a drug, will continue as the child grows, is not always known. Research does show that children born to mothers who used marijuana regularly during pregnancy may have trouble concentrating, when older. Our research continues to produce insights on the negative effects of drug use on the fetus.   Document Released: 12/20/2004 Document Revised: 03/11/2013 Document Reviewed: 03/19/2010  [employer]® Patient Information ©2013 [employer].    Near-Syncope  Introduction  Near-syncope is when you suddenly become weak or dizzy, or you feel like you might pass out (faint). During an episode of near-syncope, you may:  · Feel dizzy or light-headed.  · Feel nauseous.  · See all white or all black in your field of vision.  · Have cold, clammy skin.  This condition is caused by a sudden decrease in blood flow to the brain. This  decrease can result from various causes, but most of those causes are not dangerous. However, near-syncope can be a sign of a serious medical problem, so it is important to seek medical care.  If you fainted, get medical help right away.Call your local emergency services (771 in the [place] Do not drive yourself to the hospital.  Follow these instructions at home:  Pay attention to any changes in your symptoms. Take these actions to help with your condition:  · Have someone stay with you until you feel stable.  · Do not drive, use machinery, or play sports until your health care provider says it is okay.  · Keep all follow-up visits as told by your health care provider. This is important.  · If you start to feel like you might faint, lie down right away and raise (elevate) your feet above the level of your heart. Breathe deeply and steadily. Wait until all of the symptoms have passed.  · Drink enough fluid to keep your urine clear or pale yellow.  · If you are taking blood pressure or heart medicine, get up slowly and take several minutes to sit and then stand. This can reduce dizziness.  · Take over-the-counter and prescription medicines only as told by your health care provider.  Get help right away if:  · You have a severe headache.  · You have unusual pain in your chest, abdomen, or back.  · You are bleeding from your mouth or rectum, or you have black or tarry stool.  · You have a very fast or irregular heartbeat (palpitations).  · You faint once or repeatedly.  · You have a seizure.  · You are confused.  · You have trouble walking.  · You have severe weakness.  · You have vision problems.  These symptoms may represent a serious problem that is an emergency. Do not wait to see if your symptoms will go away. Get medical help right away. Call your local emergency services (021 in the [place] Do not drive yourself to the hospital.   This information is not intended to replace advice given to you by your health care  provider. Make sure you discuss any questions you have with your health care provider.  Document Released: 12/18/2006 Document Revised: 05/25/2017 Document Reviewed: 08/31/2016  © 2017 [employer]

## 2018-04-01 LAB
BACTERIA UR CULT: NORMAL
SIGNIFICANT IND 70042: NORMAL
SITE SITE: NORMAL
SOURCE SOURCE: NORMAL

## 2018-04-15 ENCOUNTER — HOSPITAL ENCOUNTER (EMERGENCY)
Facility: MEDICAL CENTER | Age: 23
End: 2018-04-15
Attending: EMERGENCY MEDICINE
Payer: MEDICAID

## 2018-04-15 ENCOUNTER — APPOINTMENT (OUTPATIENT)
Dept: RADIOLOGY | Facility: MEDICAL CENTER | Age: 23
End: 2018-04-15
Attending: EMERGENCY MEDICINE
Payer: MEDICAID

## 2018-04-15 VITALS
SYSTOLIC BLOOD PRESSURE: 93 MMHG | RESPIRATION RATE: 16 BRPM | OXYGEN SATURATION: 95 % | BODY MASS INDEX: 17.98 KG/M2 | WEIGHT: 108.03 LBS | HEART RATE: 65 BPM | TEMPERATURE: 98.1 F | DIASTOLIC BLOOD PRESSURE: 59 MMHG

## 2018-04-15 DIAGNOSIS — S09.90XA CLOSED HEAD INJURY, INITIAL ENCOUNTER: ICD-10-CM

## 2018-04-15 DIAGNOSIS — S00.83XA CONTUSION OF FACE, INITIAL ENCOUNTER: Primary | ICD-10-CM

## 2018-04-15 PROCEDURE — 70450 CT HEAD/BRAIN W/O DYE: CPT

## 2018-04-15 PROCEDURE — 99284 EMERGENCY DEPT VISIT MOD MDM: CPT | Mod: 25

## 2018-04-15 ASSESSMENT — PAIN SCALES - GENERAL: PAINLEVEL_OUTOF10: 10
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
## 2018-04-15 NOTE — DISCHARGE INSTRUCTIONS
Follow-up with primary care 1-2 days for reevaluation, medication management.    Tylenol or ibuprofen as needed for discomfort.  Apply ice to affected area, 20 minutes every hour, as needed for swelling or discomfort.    Recommended abstinence from illicit drugs.    Return to the emergency department for persistent or worsening headache, facial pain, altered mental status, seizure or focal weakness, visual changes, vomiting or other new concerns.    Facial or Scalp Contusion  A facial or scalp contusion is a deep bruise on the face or head. Contusions happen when an injury causes bleeding under the skin. Signs of bruising include pain, puffiness (swelling), and discolored skin. The contusion may turn blue, purple, or yellow.  Follow these instructions at home:  · Only take medicines as told by your doctor.  · Put ice on the injured area.  ¨ Put ice in a plastic bag.  ¨ Place a towel between your skin and the bag.  ¨ Leave the ice on for 20 minutes, 2-3 times a day.  Contact a doctor if:  · You have bite problems.  · You have pain when chewing.  · You are worried about your face not healing normally.  Get help right away if:  · You have severe pain or a headache and medicine does not help.  · You are very tired or confused, or your personality changes.  · You throw up (vomit).  · You have a nosebleed that will not stop.  · You see two of everything (double vision) or have blurry vision.  · You have fluid coming from your nose or ear.  · You have problems walking or using your arms or legs.  This information is not intended to replace advice given to you by your health care provider. Make sure you discuss any questions you have with your health care provider.  Document Released: 12/06/2012 Document Revised: 08/24/2017 Document Reviewed: 07/31/2014  [employer] Interactive Patient Education © 2017 [employer]    Head Injury, Adult  There are many types of head injuries. They can be as minor as a bump. Some head injuries  can be worse. Worse injuries include:  · A strong hit to the head that hurts the brain (concussion).  · A bruise of the brain (contusion). This means there is bleeding in the brain that can cause swelling.  · A cracked skull (skull fracture).  · Bleeding in the brain that gathers, gets thick (makes a clot), and forms a bump (hematoma).  Most problems from a head injury come in the first 24 hours. However, you may still have side effects up to 7-10 days after your injury. It is important to watch your condition for any changes.  Follow these instructions at home:  Activity  · Rest as much as possible.  · Avoid activities that are hard or tiring.  · Make sure you get enough sleep.  · Limit activities that need a lot of thought or attention, such as:  ¨ Watching TV.  ¨ Playing memory games and puzzles.  ¨ Job-related work or homework.  ¨ Working on the computer, social media, and texting.  · Avoid activities that could cause another head injury until your doctor says it is okay. This includes playing sports.  · Ask your doctor when it is safe for you to go back to your normal activities, such as work or school. Ask your doctor for a step-by-step plan for slowly going back to your normal activities.  · Ask your doctor when you can drive, ride a bicycle, or use heavy machinery. Never do these activities if you are dizzy.  Lifestyle  · Do not drink alcohol until your doctor says it is okay.  · Avoid drug use.  · If it is harder than usual to remember things, write them down.  · If you are easily distracted, try to do one thing at a time.  · Talk with family members or close friends when making important decisions.  · Tell your friends, family, a trusted coworker, and  about your injury, symptoms, and limits (restrictions). Have them watch for any problems that are new or getting worse.  General instructions  · Take over-the-counter and prescription medicines only as told by your doctor.  · Have someone stay  with you for 24 hours after your head injury. This person should watch you for any changes in your symptoms and be ready to get help.  · Keep all follow-up visits as told by your doctor. This is important.  Prevention  · Work on your balance and strength. This can help you avoid falls.  · Wear a seatbelt when you are in a moving vehicle.  · Wear a helmet when:  ¨ Riding a bicycle.  ¨ Skiing.  ¨ Doing any other sport or activity that has a risk of injury.  · Drink alcohol only in moderation.  · Make your home safer by:  ¨ Getting rid of clutter from the floors and stairs, like things that can make you trip.  ¨ Using grab bars in bathrooms and handrails by stairs.  ¨ Placing non-slip mats on floors and in bathtubs.  ¨ Putting more light in dim areas.  Get help right away if:  · You have:  ¨ A very bad (severe) headache that is not helped by medicine.  ¨ Trouble walking or weakness in your arms and legs.  ¨ Clear or bloody fluid coming from your nose or ears.  ¨ Changes in your seeing (vision).  ¨ Jerky movements that you cannot control (seizure).  · You throw up (vomit).  · Your symptoms get worse.  · You lose balance.  · Your speech is slurred.  · You pass out.  · You are sleepier and have trouble staying awake.  · The black centers of your eyes (pupils) change in size.  These symptoms may be an emergency. Do not wait to see if the symptoms will go away. Get medical help right away. Call your local emergency services (911 in the [place] Do not drive yourself to the hospital.   This information is not intended to replace advice given to you by your health care provider. Make sure you discuss any questions you have with your health care provider.  Document Released: 11/30/2009 Document Revised: 07/13/2017 Document Reviewed: 06/27/2017  [employer] Interactive Patient Education © 2017 [employer]
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
## 2018-06-06 ENCOUNTER — APPOINTMENT (OUTPATIENT)
Dept: RADIOLOGY | Facility: MEDICAL CENTER | Age: 23
End: 2018-06-06
Attending: EMERGENCY MEDICINE

## 2018-06-06 ENCOUNTER — HOSPITAL ENCOUNTER (OUTPATIENT)
Facility: MEDICAL CENTER | Age: 23
End: 2018-06-08
Attending: EMERGENCY MEDICINE | Admitting: HOSPITALIST

## 2018-06-06 DIAGNOSIS — R10.9 ACUTE ABDOMINAL PAIN: ICD-10-CM

## 2018-06-06 DIAGNOSIS — F11.10 HEROIN ABUSE (HCC): ICD-10-CM

## 2018-06-06 LAB
ALBUMIN SERPL BCP-MCNC: 3.7 G/DL (ref 3.2–4.9)
ALBUMIN/GLOB SERPL: 1 G/DL
ALP SERPL-CCNC: 65 U/L (ref 30–99)
ALT SERPL-CCNC: 26 U/L (ref 2–50)
ANION GAP SERPL CALC-SCNC: 8 MMOL/L (ref 0–11.9)
AST SERPL-CCNC: 14 U/L (ref 12–45)
BASOPHILS # BLD AUTO: 0.3 % (ref 0–1.8)
BASOPHILS # BLD: 0.03 K/UL (ref 0–0.12)
BILIRUB SERPL-MCNC: 0.5 MG/DL (ref 0.1–1.5)
BUN SERPL-MCNC: 15 MG/DL (ref 8–22)
CALCIUM SERPL-MCNC: 9.1 MG/DL (ref 8.5–10.5)
CHLORIDE SERPL-SCNC: 102 MMOL/L (ref 96–112)
CK SERPL-CCNC: 25 U/L (ref 0–154)
CO2 SERPL-SCNC: 25 MMOL/L (ref 20–33)
CREAT SERPL-MCNC: 0.45 MG/DL (ref 0.5–1.4)
EOSINOPHIL # BLD AUTO: 0.09 K/UL (ref 0–0.51)
EOSINOPHIL NFR BLD: 0.8 % (ref 0–6.9)
ERYTHROCYTE [DISTWIDTH] IN BLOOD BY AUTOMATED COUNT: 42.2 FL (ref 35.9–50)
GLOBULIN SER CALC-MCNC: 3.6 G/DL (ref 1.9–3.5)
GLUCOSE SERPL-MCNC: 77 MG/DL (ref 65–99)
HCG SERPL QL: NEGATIVE
HCT VFR BLD AUTO: 34.6 % (ref 37–47)
HGB BLD-MCNC: 11.5 G/DL (ref 12–16)
IMM GRANULOCYTES # BLD AUTO: 0.04 K/UL (ref 0–0.11)
IMM GRANULOCYTES NFR BLD AUTO: 0.3 % (ref 0–0.9)
LACTATE BLD-SCNC: 1.4 MMOL/L (ref 0.5–2)
LIPASE SERPL-CCNC: 8 U/L (ref 11–82)
LYMPHOCYTES # BLD AUTO: 3.05 K/UL (ref 1–4.8)
LYMPHOCYTES NFR BLD: 25.5 % (ref 22–41)
MCH RBC QN AUTO: 29.3 PG (ref 27–33)
MCHC RBC AUTO-ENTMCNC: 33.2 G/DL (ref 33.6–35)
MCV RBC AUTO: 88 FL (ref 81.4–97.8)
MONOCYTES # BLD AUTO: 0.89 K/UL (ref 0–0.85)
MONOCYTES NFR BLD AUTO: 7.4 % (ref 0–13.4)
NEUTROPHILS # BLD AUTO: 7.88 K/UL (ref 2–7.15)
NEUTROPHILS NFR BLD: 65.7 % (ref 44–72)
NRBC # BLD AUTO: 0 K/UL
NRBC BLD-RTO: 0 /100 WBC
PLATELET # BLD AUTO: 286 K/UL (ref 164–446)
PMV BLD AUTO: 9.1 FL (ref 9–12.9)
POTASSIUM SERPL-SCNC: 3.9 MMOL/L (ref 3.6–5.5)
PROT SERPL-MCNC: 7.3 G/DL (ref 6–8.2)
RBC # BLD AUTO: 3.93 M/UL (ref 4.2–5.4)
SODIUM SERPL-SCNC: 135 MMOL/L (ref 135–145)
WBC # BLD AUTO: 12 K/UL (ref 4.8–10.8)

## 2018-06-06 PROCEDURE — 99220 PR INITIAL OBSERVATION CARE,LEVL III: CPT | Performed by: HOSPITALIST

## 2018-06-06 PROCEDURE — 74177 CT ABD & PELVIS W/CONTRAST: CPT

## 2018-06-06 PROCEDURE — G0378 HOSPITAL OBSERVATION PER HR: HCPCS

## 2018-06-06 PROCEDURE — 700117 HCHG RX CONTRAST REV CODE 255: Performed by: EMERGENCY MEDICINE

## 2018-06-06 PROCEDURE — 82550 ASSAY OF CK (CPK): CPT

## 2018-06-06 PROCEDURE — 83690 ASSAY OF LIPASE: CPT

## 2018-06-06 PROCEDURE — 85025 COMPLETE CBC W/AUTO DIFF WBC: CPT

## 2018-06-06 PROCEDURE — 96374 THER/PROPH/DIAG INJ IV PUSH: CPT

## 2018-06-06 PROCEDURE — 36415 COLL VENOUS BLD VENIPUNCTURE: CPT

## 2018-06-06 PROCEDURE — 83605 ASSAY OF LACTIC ACID: CPT

## 2018-06-06 PROCEDURE — 700111 HCHG RX REV CODE 636 W/ 250 OVERRIDE (IP): Performed by: EMERGENCY MEDICINE

## 2018-06-06 PROCEDURE — 99285 EMERGENCY DEPT VISIT HI MDM: CPT

## 2018-06-06 PROCEDURE — 80053 COMPREHEN METABOLIC PANEL: CPT

## 2018-06-06 PROCEDURE — 84703 CHORIONIC GONADOTROPIN ASSAY: CPT

## 2018-06-06 PROCEDURE — 96375 TX/PRO/DX INJ NEW DRUG ADDON: CPT

## 2018-06-06 RX ORDER — LORAZEPAM 2 MG/ML
1 INJECTION INTRAMUSCULAR ONCE
Status: COMPLETED | OUTPATIENT
Start: 2018-06-07 | End: 2018-06-07

## 2018-06-06 RX ORDER — ONDANSETRON 2 MG/ML
4 INJECTION INTRAMUSCULAR; INTRAVENOUS ONCE
Status: COMPLETED | OUTPATIENT
Start: 2018-06-06 | End: 2018-06-06

## 2018-06-06 RX ORDER — PROMETHAZINE HYDROCHLORIDE 12.5 MG/1
12.5-25 SUPPOSITORY RECTAL EVERY 4 HOURS PRN
Status: DISCONTINUED | OUTPATIENT
Start: 2018-06-06 | End: 2018-06-08 | Stop reason: HOSPADM

## 2018-06-06 RX ORDER — HYDROMORPHONE HYDROCHLORIDE 2 MG/ML
1 INJECTION, SOLUTION INTRAMUSCULAR; INTRAVENOUS; SUBCUTANEOUS ONCE
Status: COMPLETED | OUTPATIENT
Start: 2018-06-06 | End: 2018-06-06

## 2018-06-06 RX ORDER — BISACODYL 10 MG
10 SUPPOSITORY, RECTAL RECTAL
Status: DISCONTINUED | OUTPATIENT
Start: 2018-06-06 | End: 2018-06-08 | Stop reason: HOSPADM

## 2018-06-06 RX ORDER — ONDANSETRON 2 MG/ML
4 INJECTION INTRAMUSCULAR; INTRAVENOUS EVERY 4 HOURS PRN
Status: DISCONTINUED | OUTPATIENT
Start: 2018-06-06 | End: 2018-06-08 | Stop reason: HOSPADM

## 2018-06-06 RX ORDER — SODIUM CHLORIDE 9 MG/ML
INJECTION, SOLUTION INTRAVENOUS CONTINUOUS
Status: DISCONTINUED | OUTPATIENT
Start: 2018-06-06 | End: 2018-06-08 | Stop reason: HOSPADM

## 2018-06-06 RX ORDER — PROMETHAZINE HYDROCHLORIDE 25 MG/1
12.5-25 TABLET ORAL EVERY 4 HOURS PRN
Status: DISCONTINUED | OUTPATIENT
Start: 2018-06-06 | End: 2018-06-08 | Stop reason: HOSPADM

## 2018-06-06 RX ORDER — HEPARIN SODIUM 5000 [USP'U]/ML
5000 INJECTION, SOLUTION INTRAVENOUS; SUBCUTANEOUS EVERY 8 HOURS
Status: DISCONTINUED | OUTPATIENT
Start: 2018-06-06 | End: 2018-06-08 | Stop reason: HOSPADM

## 2018-06-06 RX ORDER — ONDANSETRON 4 MG/1
4 TABLET, ORALLY DISINTEGRATING ORAL EVERY 4 HOURS PRN
Status: DISCONTINUED | OUTPATIENT
Start: 2018-06-06 | End: 2018-06-08 | Stop reason: HOSPADM

## 2018-06-06 RX ORDER — AMOXICILLIN 250 MG
2 CAPSULE ORAL 2 TIMES DAILY
Status: DISCONTINUED | OUTPATIENT
Start: 2018-06-06 | End: 2018-06-08 | Stop reason: HOSPADM

## 2018-06-06 RX ORDER — POLYETHYLENE GLYCOL 3350 17 G/17G
1 POWDER, FOR SOLUTION ORAL
Status: DISCONTINUED | OUTPATIENT
Start: 2018-06-06 | End: 2018-06-08 | Stop reason: HOSPADM

## 2018-06-06 RX ADMIN — ONDANSETRON 4 MG: 2 INJECTION INTRAMUSCULAR; INTRAVENOUS at 21:36

## 2018-06-06 RX ADMIN — IOHEXOL 100 ML: 350 INJECTION, SOLUTION INTRAVENOUS at 21:55

## 2018-06-06 RX ADMIN — HYDROMORPHONE HYDROCHLORIDE 1 MG: 2 INJECTION INTRAMUSCULAR; INTRAVENOUS; SUBCUTANEOUS at 21:36

## 2018-06-07 PROBLEM — R11.0 NAUSEA: Status: ACTIVE | Noted: 2018-06-07

## 2018-06-07 PROBLEM — D72.829 LEUKOCYTOSIS: Status: ACTIVE | Noted: 2018-06-07

## 2018-06-07 PROBLEM — F11.10 HEROIN ABUSE (HCC): Status: ACTIVE | Noted: 2018-06-07

## 2018-06-07 PROBLEM — D64.9 ANEMIA: Status: ACTIVE | Noted: 2018-06-07

## 2018-06-07 PROBLEM — R10.9 ABDOMINAL PAIN: Status: ACTIVE | Noted: 2018-06-07

## 2018-06-07 PROBLEM — Z72.0 TOBACCO ABUSE: Status: ACTIVE | Noted: 2018-06-07

## 2018-06-07 LAB
CRP SERPL HS-MCNC: 7.55 MG/DL (ref 0–0.75)
ERYTHROCYTE [SEDIMENTATION RATE] IN BLOOD BY WESTERGREN METHOD: 40 MM/HOUR (ref 0–20)
EST. AVERAGE GLUCOSE BLD GHB EST-MCNC: 108 MG/DL
FERRITIN SERPL-MCNC: 53.2 NG/ML (ref 10–291)
FOLATE SERPL-MCNC: 17.4 NG/ML
HBA1C MFR BLD: 5.4 % (ref 0–5.6)
HGB RETIC QN AUTO: 29.7 PG/CELL (ref 29–35)
IMM RETICS NFR: 12.7 % (ref 9.3–17.4)
IRON SATN MFR SERPL: 3 % (ref 15–55)
IRON SERPL-MCNC: 10 UG/DL (ref 40–170)
RETICS # AUTO: 0.06 M/UL (ref 0.04–0.06)
RETICS/RBC NFR: 1.6 % (ref 0.8–2.1)
TIBC SERPL-MCNC: 319 UG/DL (ref 250–450)
TSH SERPL DL<=0.005 MIU/L-ACNC: 0.56 UIU/ML (ref 0.38–5.33)
VIT B12 SERPL-MCNC: 357 PG/ML (ref 211–911)

## 2018-06-07 PROCEDURE — G0378 HOSPITAL OBSERVATION PER HR: HCPCS

## 2018-06-07 PROCEDURE — 83540 ASSAY OF IRON: CPT

## 2018-06-07 PROCEDURE — 85046 RETICYTE/HGB CONCENTRATE: CPT

## 2018-06-07 PROCEDURE — 700105 HCHG RX REV CODE 258: Performed by: HOSPITALIST

## 2018-06-07 PROCEDURE — 700111 HCHG RX REV CODE 636 W/ 250 OVERRIDE (IP): Performed by: HOSPITALIST

## 2018-06-07 PROCEDURE — 83550 IRON BINDING TEST: CPT

## 2018-06-07 PROCEDURE — 84443 ASSAY THYROID STIM HORMONE: CPT

## 2018-06-07 PROCEDURE — 85652 RBC SED RATE AUTOMATED: CPT

## 2018-06-07 PROCEDURE — 36415 COLL VENOUS BLD VENIPUNCTURE: CPT

## 2018-06-07 PROCEDURE — 99225 PR SUBSEQUENT OBSERVATION CARE,LEVEL II: CPT | Performed by: HOSPITALIST

## 2018-06-07 PROCEDURE — 86140 C-REACTIVE PROTEIN: CPT

## 2018-06-07 PROCEDURE — 82607 VITAMIN B-12: CPT

## 2018-06-07 PROCEDURE — 82728 ASSAY OF FERRITIN: CPT

## 2018-06-07 PROCEDURE — A9270 NON-COVERED ITEM OR SERVICE: HCPCS | Performed by: STUDENT IN AN ORGANIZED HEALTH CARE EDUCATION/TRAINING PROGRAM

## 2018-06-07 PROCEDURE — 700102 HCHG RX REV CODE 250 W/ 637 OVERRIDE(OP): Performed by: HOSPITALIST

## 2018-06-07 PROCEDURE — A9270 NON-COVERED ITEM OR SERVICE: HCPCS | Performed by: HOSPITALIST

## 2018-06-07 PROCEDURE — 700111 HCHG RX REV CODE 636 W/ 250 OVERRIDE (IP): Performed by: EMERGENCY MEDICINE

## 2018-06-07 PROCEDURE — 83036 HEMOGLOBIN GLYCOSYLATED A1C: CPT

## 2018-06-07 PROCEDURE — 96372 THER/PROPH/DIAG INJ SC/IM: CPT

## 2018-06-07 PROCEDURE — 96375 TX/PRO/DX INJ NEW DRUG ADDON: CPT

## 2018-06-07 PROCEDURE — 82746 ASSAY OF FOLIC ACID SERUM: CPT

## 2018-06-07 PROCEDURE — 700102 HCHG RX REV CODE 250 W/ 637 OVERRIDE(OP): Performed by: STUDENT IN AN ORGANIZED HEALTH CARE EDUCATION/TRAINING PROGRAM

## 2018-06-07 RX ORDER — TRAMADOL HYDROCHLORIDE 50 MG/1
50 TABLET ORAL 2 TIMES DAILY PRN
Status: DISCONTINUED | OUTPATIENT
Start: 2018-06-07 | End: 2018-06-08 | Stop reason: HOSPADM

## 2018-06-07 RX ORDER — ACETAMINOPHEN 325 MG/1
650 TABLET ORAL EVERY 6 HOURS PRN
Status: DISCONTINUED | OUTPATIENT
Start: 2018-06-07 | End: 2018-06-08 | Stop reason: HOSPADM

## 2018-06-07 RX ORDER — POLYETHYLENE GLYCOL 3350 17 G/17G
17 POWDER, FOR SOLUTION ORAL
Qty: 30 EACH | Refills: 0 | Status: SHIPPED | OUTPATIENT
Start: 2018-06-07 | End: 2019-02-07

## 2018-06-07 RX ORDER — METHADONE HYDROCHLORIDE 5 MG/1
5 TABLET ORAL EVERY 8 HOURS
Status: DISCONTINUED | OUTPATIENT
Start: 2018-06-07 | End: 2018-06-08 | Stop reason: HOSPADM

## 2018-06-07 RX ORDER — IBUPROFEN 800 MG/1
400 TABLET ORAL EVERY 6 HOURS PRN
Status: DISCONTINUED | OUTPATIENT
Start: 2018-06-07 | End: 2018-06-08 | Stop reason: HOSPADM

## 2018-06-07 RX ORDER — FERROUS SULFATE 325(65) MG
325 TABLET ORAL
Status: DISCONTINUED | OUTPATIENT
Start: 2018-06-08 | End: 2018-06-08 | Stop reason: HOSPADM

## 2018-06-07 RX ORDER — AMOXICILLIN 250 MG
2 CAPSULE ORAL 2 TIMES DAILY PRN
Qty: 30 TAB | Refills: 0 | Status: SHIPPED | OUTPATIENT
Start: 2018-06-07 | End: 2019-07-24

## 2018-06-07 RX ADMIN — POLYETHYLENE GLYCOL 3350 1 PACKET: 17 POWDER, FOR SOLUTION ORAL at 11:24

## 2018-06-07 RX ADMIN — STANDARDIZED SENNA CONCENTRATE AND DOCUSATE SODIUM 2 TABLET: 8.6; 5 TABLET, FILM COATED ORAL at 21:10

## 2018-06-07 RX ADMIN — HEPARIN SODIUM 5000 UNITS: 5000 INJECTION, SOLUTION INTRAVENOUS; SUBCUTANEOUS at 00:01

## 2018-06-07 RX ADMIN — SODIUM CHLORIDE: 9 INJECTION, SOLUTION INTRAVENOUS at 07:20

## 2018-06-07 RX ADMIN — METHADONE HYDROCHLORIDE 5 MG: 5 TABLET ORAL at 21:10

## 2018-06-07 RX ADMIN — MAGNESIUM HYDROXIDE 30 ML: 400 SUSPENSION ORAL at 00:03

## 2018-06-07 RX ADMIN — ACETAMINOPHEN 650 MG: 325 TABLET, FILM COATED ORAL at 07:11

## 2018-06-07 RX ADMIN — BISACODYL 10 MG: 10 SUPPOSITORY RECTAL at 18:07

## 2018-06-07 RX ADMIN — METHADONE HYDROCHLORIDE 5 MG: 5 TABLET ORAL at 11:21

## 2018-06-07 RX ADMIN — TRAMADOL HYDROCHLORIDE 50 MG: 50 TABLET, COATED ORAL at 04:57

## 2018-06-07 RX ADMIN — SODIUM CHLORIDE: 9 INJECTION, SOLUTION INTRAVENOUS at 00:02

## 2018-06-07 RX ADMIN — IBUPROFEN 400 MG: 800 TABLET, FILM COATED ORAL at 07:11

## 2018-06-07 RX ADMIN — SODIUM CHLORIDE: 9 INJECTION, SOLUTION INTRAVENOUS at 15:58

## 2018-06-07 RX ADMIN — LORAZEPAM 1 MG: 2 INJECTION INTRAMUSCULAR; INTRAVENOUS at 00:02

## 2018-06-07 RX ADMIN — STANDARDIZED SENNA CONCENTRATE AND DOCUSATE SODIUM 2 TABLET: 8.6; 5 TABLET, FILM COATED ORAL at 00:03

## 2018-06-07 ASSESSMENT — ENCOUNTER SYMPTOMS
FEVER: 0
COUGH: 0
HALLUCINATIONS: 0
FOCAL WEAKNESS: 0
VOMITING: 0
HEARTBURN: 0
PALPITATIONS: 0
WEAKNESS: 0
DOUBLE VISION: 0
SHORTNESS OF BREATH: 0
BRUISES/BLEEDS EASILY: 0
CONSTIPATION: 1
CHILLS: 0
SENSORY CHANGE: 0
EYE DISCHARGE: 0
SPEECH CHANGE: 0
DIZZINESS: 0
FLANK PAIN: 0
MYALGIAS: 0
BLURRED VISION: 0
HEMOPTYSIS: 0
ABDOMINAL PAIN: 1
DEPRESSION: 0
NAUSEA: 1

## 2018-06-07 ASSESSMENT — PAIN SCALES - GENERAL
PAINLEVEL_OUTOF10: 8

## 2018-06-07 ASSESSMENT — LIFESTYLE VARIABLES: SUBSTANCE_ABUSE: 0

## 2018-06-07 NOTE — ASSESSMENT & PLAN NOTE
Reports constipation, no effective Bm for weeks. CT no acute findings. Pain in abdomen out of proportion to clinical and exam findings.  Continue w bowel protocols for constipation   IVF, anti emetics   Anticipate can dc home when adequ Bms and pain pain improved.

## 2018-06-07 NOTE — ED NOTES
will come talk to the pt and provide resources regarding reinstating health insurance, obtaining prescriptions, possible safe house options.
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
## 2018-06-07 NOTE — ASSESSMENT & PLAN NOTE
Advised cessation, mother reports has been to several rehab programs and walks out after several days.  Avoid IV opiates w concern for tolerance and abuse.   Increased agitation - will add methadone w concern for withdrawal.

## 2018-06-07 NOTE — PROGRESS NOTES
Report received from RN, assumed care at 0700  Pt is A0X4, and responds appropriately   Pt declines any SOB, chest pain, new onset of numbness/ tingiling  Pt rates pain at 8 /10, on a scale of 1-10, pt medicated per MAR  Pt is voiding, pt had x2 bouts of incontinence   Pt has + flatus, + bowel sounds, +BM 6/7/2018  Pt ambulates with a x1 assist   Pt is tolerating a clear liquid diet, pt denies any nausea/vomiting  Pt states she is withdrawing from the heroin, last time she used was yesterday morning, MD updated methadone started   Pt refusing skin check  Pt refusing to answer admit profile questions   Pt refusing to get OOB  Plan of care discussed, all questions answered. Explained importance of calling before getting OOB and pt verbalizes understanding. Explained importance of oral care. Call light is within reach, treaded slipper socks on, bed in lowest/ locked position, hourly rounding in place, all needs met at this time
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
## 2018-06-07 NOTE — PROGRESS NOTES
A/Ox 4, crying, unresolved pain, sedated upon arrival to floor due to ativan administration.  VSS.  Reports moderate abdominal pain 8 /10, medicated per MAR, heat applied.    + ROMAN, denies numbness and tingling, generalized weakness.  RA, satting >90%, denies SOB or difficulty breathing.  SCDs refused.  + normoactive BSx4, + flatus, no BM this shift, LBM pta, hasn't had a bowel movement in 1 month, denies n/v.  NPO, sips with meds.  + void, QS.  Up with SBA, unsteady, pain with movement.  Ambulated from gurney to bed, repositions self frequently.  IVF to PIV, PO intake encouraged.    Call light within reach, calls appropriately.  Bed in lowest locked position.
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
## 2018-06-07 NOTE — ASSESSMENT & PLAN NOTE
This is likely reactive-- Abd exam benign, afeb. No acute resp or urinary symptoms reported  Follow clinically

## 2018-06-07 NOTE — H&P
Hospital Medicine History and Physical    Date of Service  6/6/2018    Chief Complaint  Chief Complaint   Patient presents with   • Abdominal Pain     since last night.    • Nausea     without vomiting       History of Presenting Illness  22 y.o. female who presented 6/6/2018 with diffuse abdominal pain. Patient is a known IV drug abuser. She's had new abdominal pain over the last 2 days associated with nausea. No vomiting. She's had constipation for about a week now. No diarrhea. No fevers no chills no sweats. No cough congestion. No blood in the stool. No dysuria. Abdominal pain is diffuse nothing makes it better nothing makes it worse. She is found to have constipation on CT.     Primary Care Physician  Pcp Pt States None    Consultants  none    Code Status  full    Review of Systems  Review of Systems   Constitutional: Negative for chills and fever.   HENT: Negative for congestion, hearing loss and tinnitus.    Eyes: Negative for blurred vision, double vision and discharge.   Respiratory: Negative for cough, hemoptysis and shortness of breath.    Cardiovascular: Negative for chest pain, palpitations and leg swelling.   Gastrointestinal: Positive for abdominal pain, constipation and nausea. Negative for heartburn and vomiting.   Genitourinary: Negative for dysuria and flank pain.   Musculoskeletal: Negative for joint pain and myalgias.   Skin: Negative for rash.   Neurological: Negative for dizziness, sensory change, speech change, focal weakness and weakness.   Endo/Heme/Allergies: Negative for environmental allergies. Does not bruise/bleed easily.   Psychiatric/Behavioral: Negative for depression, hallucinations and substance abuse.        Past Medical History  Past Medical History:   Diagnosis Date   • Anxiety    • Bacterial vaginosis    • Depression    • Epilepsy (HCC)    • Fx clavicle right   • Hepatitis C    • Migraine    • PID (pelvic inflammatory disease)    • Psychiatric disorder     bipoal - adhd   •  Substance abuse        Surgical History  Past Surgical History:   Procedure Laterality Date   • GYN SURGERY      ovarian cysts   • OTHER      toncils, adenoids, appendix   • OTHER ABDOMINAL SURGERY      telescoping intestines   • TONSILLECTOMY         Medications  No current facility-administered medications on file prior to encounter.      Current Outpatient Prescriptions on File Prior to Encounter   Medication Sig Dispense Refill   • naproxen (NAPROSYN) 375 MG Tab Take 1 Tab by mouth 2 times daily with meals as needed (pain). 10 Tab 0       Family History  Family History   Problem Relation Age of Onset   • Bipolar disorder Mother    • Depression Mother    • Genitourinary () Mother    • Heart Attack Mother    • Recurrent UTI's Mother    • Sexual abuse Mother    • Stroke Mother    • Alcohol/Drug Father    • Anxiety disorder Father    • Bipolar disorder Father    • Depression Father    • Paranoid behavior Father    • Psychiatry Father        Social History  Social History   Substance Use Topics   • Smoking status: Current Every Day Smoker     Packs/day: 1.00     Years: 8.00     Types: Cigarettes   • Smokeless tobacco: Never Used   • Alcohol use Yes      Comment: 2x weekly       Allergies  Allergies   Allergen Reactions   • Morphine Shortness of Breath     States went unconscious   • Other Drug      PT STATES SHE DOESN'T TOLERATE THE SEIZURE MEDS AND HAS TOO MANY SIDE EFFECTS   • Latex         Physical Exam  Laboratory   Hemodynamics  Temp (24hrs), Av.3 °C (99.2 °F), Min:37.3 °C (99.2 °F), Max:37.3 °C (99.2 °F)   Temperature: 37.3 °C (99.2 °F)  Pulse  Av  Min: 100  Max: 100 Heart Rate (Monitored): 94  Blood Pressure: 101/64, NIBP: (!) 94/54      Respiratory      Respiration: (!) 21, Pulse Oximetry: 100 %             Physical Exam   Constitutional: She is oriented to person, place, and time. She appears well-developed and well-nourished. She appears distressed.   HENT:   Head: Normocephalic and atraumatic.    Eyes: Conjunctivae and EOM are normal. Pupils are equal, round, and reactive to light.   Neck: Normal range of motion. Neck supple. No JVD present.   Cardiovascular: Normal rate, regular rhythm, normal heart sounds and intact distal pulses.    No murmur heard.  Pulmonary/Chest: Effort normal and breath sounds normal. No respiratory distress. She has no wheezes.   Abdominal: Soft. Bowel sounds are normal. She exhibits no distension. There is tenderness.   Diffuse   Out of porportion pain   Musculoskeletal: Normal range of motion. She exhibits no edema.   Neurological: She is alert and oriented to person, place, and time. She exhibits normal muscle tone.   Skin: Skin is warm and dry.   Psychiatric: She has a normal mood and affect. Her behavior is normal. Judgment and thought content normal.   Nursing note and vitals reviewed.      Recent Labs      06/06/18 2139   WBC  12.0*   RBC  3.93*   HEMOGLOBIN  11.5*   HEMATOCRIT  34.6*   MCV  88.0   MCH  29.3   MCHC  33.2*   RDW  42.2   PLATELETCT  286   MPV  9.1     Recent Labs      06/06/18 2139   SODIUM  135   POTASSIUM  3.9   CHLORIDE  102   CO2  25   GLUCOSE  77   BUN  15   CREATININE  0.45*   CALCIUM  9.1     Recent Labs      06/06/18   2139   ALTSGPT  26   ASTSGOT  14   ALKPHOSPHAT  65   TBILIRUBIN  0.5   LIPASE  8*   GLUCOSE  77                 Lab Results   Component Value Date    TROPONINI <0.01 03/30/2018     Urinalysis:    Lab Results  Component Value Date/Time   SPECGRAVITY 1.018 03/30/2018 0415   SPECGRAVITY 1.018 03/30/2018 0415   GLUCOSEUR Negative 03/30/2018 0415   KETONES Negative 03/30/2018 0415   NITRITE Negative 03/30/2018 0415   WBCURINE 20-50 (A) 03/30/2018 0415   RBCURINE 0-2 03/30/2018 0415   BACTERIA Few (A) 03/30/2018 0415   EPITHELCELL Few 03/30/2018 0415        Imaging  CT-ABDOMEN-PELVIS WITH [[id number]] Resulted: 06/06/18 2203   Order Status: Completed Updated: 06/06/18 2205   Narrative:       6/6/2018 9:28 PM    HISTORY/REASON FOR EXAM:   Abdominal pain after trauma.      TECHNIQUE/EXAM DESCRIPTION:  CT scan of the abdomen and pelvis with contrast.    Contrast-enhanced helical scanning was obtained from the diaphragmatic domes through the pubic symphysis following the bolus administration of 100 mL of Omnipaque 350 nonionic contrast without complication.    Low dose optimization technique was utilized for this CT exam including automated exposure control and adjustment of the mA and/or kV according to patient size.    COMPARISON: No prior studies available.    FINDINGS:  The visualized lung bases are clear.    CT Abdomen:  The bowel and mesentery are unremarkable. No free intraperineal fluid or air. No adenopathy.  The liver is unremarkable.  The spleen is unremarkable.  The pancreas is unremarkable.  The gallbladder demonstrates no stones.  The adrenal glands are normal in size.  The kidneys enhance symmetrically. No hydronephrosis or extravasation.        CT Pelvis:  There is no acute inflammatory process in the pelvis.     Impression:       Unremarkable contrast enhanced CT scan of the abdomen and pelvis.        Assessment/Plan     I anticipate this patient is appropriate for observation status at this time.    * Abdominal pain   Assessment & Plan    This patient presents with difuse abd pain, out of porportion to findings   Will minimize opiate pain medications   Needs bowel regimin for constipation   IVF, anti emetics   Serial abd exams         Tobacco abuse   Assessment & Plan    Needs counseling         Heroin abuse   Assessment & Plan    Needs counseling         Nausea   Assessment & Plan    Anti emetics        Anemia   Assessment & Plan    Needs lab workup         Leukocytosis   Assessment & Plan    This is likely reactive there is no sepsis and no acute signs of current infection        Seizure disorder (HCC)- (present on admission)   Assessment & Plan    Unknown medications   Needs med rec done in am when pharmacies open            VTE  prophylaxis: Heparin

## 2018-06-08 VITALS
RESPIRATION RATE: 18 BRPM | WEIGHT: 113.32 LBS | DIASTOLIC BLOOD PRESSURE: 62 MMHG | TEMPERATURE: 97.9 F | HEIGHT: 66 IN | SYSTOLIC BLOOD PRESSURE: 99 MMHG | BODY MASS INDEX: 18.21 KG/M2 | OXYGEN SATURATION: 96 % | HEART RATE: 54 BPM

## 2018-06-08 LAB
BASOPHILS # BLD AUTO: 0.3 % (ref 0–1.8)
BASOPHILS # BLD: 0.02 K/UL (ref 0–0.12)
EOSINOPHIL # BLD AUTO: 0.05 K/UL (ref 0–0.51)
EOSINOPHIL NFR BLD: 0.6 % (ref 0–6.9)
ERYTHROCYTE [DISTWIDTH] IN BLOOD BY AUTOMATED COUNT: 38.8 FL (ref 35.9–50)
HCT VFR BLD AUTO: 31.1 % (ref 37–47)
HGB BLD-MCNC: 10.7 G/DL (ref 12–16)
IMM GRANULOCYTES # BLD AUTO: 0.02 K/UL (ref 0–0.11)
IMM GRANULOCYTES NFR BLD AUTO: 0.3 % (ref 0–0.9)
LYMPHOCYTES # BLD AUTO: 2.99 K/UL (ref 1–4.8)
LYMPHOCYTES NFR BLD: 38.5 % (ref 22–41)
MCH RBC QN AUTO: 29.2 PG (ref 27–33)
MCHC RBC AUTO-ENTMCNC: 34.4 G/DL (ref 33.6–35)
MCV RBC AUTO: 84.7 FL (ref 81.4–97.8)
MONOCYTES # BLD AUTO: 0.59 K/UL (ref 0–0.85)
MONOCYTES NFR BLD AUTO: 7.6 % (ref 0–13.4)
NEUTROPHILS # BLD AUTO: 4.1 K/UL (ref 2–7.15)
NEUTROPHILS NFR BLD: 52.7 % (ref 44–72)
NRBC # BLD AUTO: 0 K/UL
NRBC BLD-RTO: 0 /100 WBC
PLATELET # BLD AUTO: 301 K/UL (ref 164–446)
PMV BLD AUTO: 9.5 FL (ref 9–12.9)
RBC # BLD AUTO: 3.67 M/UL (ref 4.2–5.4)
WBC # BLD AUTO: 7.8 K/UL (ref 4.8–10.8)

## 2018-06-08 PROCEDURE — 700102 HCHG RX REV CODE 250 W/ 637 OVERRIDE(OP): Performed by: STUDENT IN AN ORGANIZED HEALTH CARE EDUCATION/TRAINING PROGRAM

## 2018-06-08 PROCEDURE — 700102 HCHG RX REV CODE 250 W/ 637 OVERRIDE(OP): Performed by: HOSPITALIST

## 2018-06-08 PROCEDURE — G0378 HOSPITAL OBSERVATION PER HR: HCPCS

## 2018-06-08 PROCEDURE — 85025 COMPLETE CBC W/AUTO DIFF WBC: CPT

## 2018-06-08 PROCEDURE — 99217 PR OBSERVATION CARE DISCHARGE: CPT | Performed by: HOSPITALIST

## 2018-06-08 PROCEDURE — A9270 NON-COVERED ITEM OR SERVICE: HCPCS | Performed by: STUDENT IN AN ORGANIZED HEALTH CARE EDUCATION/TRAINING PROGRAM

## 2018-06-08 PROCEDURE — A9270 NON-COVERED ITEM OR SERVICE: HCPCS | Performed by: HOSPITALIST

## 2018-06-08 PROCEDURE — 700105 HCHG RX REV CODE 258: Performed by: HOSPITALIST

## 2018-06-08 PROCEDURE — 36415 COLL VENOUS BLD VENIPUNCTURE: CPT

## 2018-06-08 RX ORDER — DIPHENHYDRAMINE HCL 25 MG
25 TABLET ORAL ONCE
Status: COMPLETED | OUTPATIENT
Start: 2018-06-08 | End: 2018-06-08

## 2018-06-08 RX ADMIN — DIPHENHYDRAMINE HCL 25 MG: 25 TABLET ORAL at 02:58

## 2018-06-08 RX ADMIN — SODIUM CHLORIDE: 9 INJECTION, SOLUTION INTRAVENOUS at 03:00

## 2018-06-08 RX ADMIN — STANDARDIZED SENNA CONCENTRATE AND DOCUSATE SODIUM 2 TABLET: 8.6; 5 TABLET, FILM COATED ORAL at 08:47

## 2018-06-08 RX ADMIN — METHADONE HYDROCHLORIDE 5 MG: 5 TABLET ORAL at 06:07

## 2018-06-08 RX ADMIN — FERROUS SULFATE TAB 325 MG (65 MG ELEMENTAL FE) 325 MG: 325 (65 FE) TAB at 08:47

## 2018-06-08 ASSESSMENT — PAIN SCALES - GENERAL
PAINLEVEL_OUTOF10: 8
PAINLEVEL_OUTOF10: 7

## 2018-06-08 NOTE — PROGRESS NOTES
Report received from RN, assumed care at 0700  Pt is A0X4, and responds appropriately   Pt declines any SOB, chest pain, new onset of numbness/ tingiling  Pt rates pain at 8 /10, on a scale of 1-10, pt declines pain medications at this time  Pt is voiding adequately and without hesitancy   Pt has + flatus, + bowel sounds, +BM 6/7/2018  Pt ambulates with a x1 assist   Pt is tolerating a regualr diet, pt denies any nausea/vomiting   Pt still refusing skin check  Pt still refusing to answer admit profile questions   Pt refusing to get OOB  Plan of care discussed, all questions answered. Explained importance of calling before getting OOB and pt verbalizes understanding. Explained importance of oral care. Call light is within reach, treaded slipper socks on, bed in lowest/ locked position, hourly rounding in place, all needs met at this time

## 2018-06-08 NOTE — PROGRESS NOTES
[facility] Hospitalist Progress Note    Date of Service: 2018    Chief Complaint  22 y.o. female hx of active heroin IVDA admitted 2018 with Nausea , Abd Pain. Reports has not had a BM for weeks. CT findings of no acute intra abdominal findings.     Interval Problem Update    Has been difficult, poorly compliant. At times yelling out, restless. RN reports not taking laxatives. This afternoon has had BMs, feels constipation has not completely resolved.     Consultants/Specialty  None     Disposition  Anticipate dc to shelter when improved symptoms.         Review of Systems   Constitutional:        Not consistently answering questions.  Reports persistent constipation, actively uses IV heroin.       Physical Exam  Laboratory/Imaging   Hemodynamics  Temp (24hrs), Av.2 °C (98.9 °F), Min:36.8 °C (98.2 °F), Max:37.3 °C (99.2 °F)   Temperature: 37.3 °C (99.1 °F)  Pulse  Av  Min: 58  Max: 100 Heart Rate (Monitored): 81  Blood Pressure: 113/79, NIBP: 101/61      Respiratory      Respiration: 17, Pulse Oximetry: 96 %             Fluids    Intake/Output Summary (Last 24 hours) at 18 1803  Last data filed at 18 0125   Gross per 24 hour   Intake                0 ml   Output                0 ml   Net                0 ml       Nutrition  Orders Placed This Encounter   Procedures   • DIET ORDER     Standing Status:   Standing     Number of Occurrences:   1     Order Specific Question:   Diet:     Answer:   Clear Liquid [10]     Comments:   adv as jabier     Physical Exam   Constitutional: She is oriented to person, place, and time. No distress.   Disheveled  appearance. Poorly cooperative.    HENT:   Head: Normocephalic and atraumatic.   Eyes: EOM are normal. Right eye exhibits no discharge. Left eye exhibits no discharge. No scleral icterus.   Neck: Neck supple. No JVD present.   Cardiovascular: Normal rate and regular rhythm.    No murmur heard.  Pulmonary/Chest: Effort normal. No stridor. She has no  wheezes. She has no rales.   Abdominal: Soft. Bowel sounds are normal. She exhibits no distension. There is no tenderness.   Musculoskeletal: She exhibits no edema or tenderness.   Neurological: She is oriented to person, place, and time. No cranial nerve deficit.   No gross focal weakness   Skin: Skin is warm and dry. She is not diaphoretic. No pallor.   Vitals reviewed.      Recent Labs      06/06/18   2139   WBC  12.0*   RBC  3.93*   HEMOGLOBIN  11.5*   HEMATOCRIT  34.6*   MCV  88.0   MCH  29.3   MCHC  33.2*   RDW  42.2   PLATELETCT  286   MPV  9.1     Recent Labs      06/06/18   2139   SODIUM  135   POTASSIUM  3.9   CHLORIDE  102   CO2  25   GLUCOSE  77   BUN  15   CREATININE  0.45*   CALCIUM  9.1                      Assessment/Plan     * Abdominal pain   Assessment & Plan    Reports constipation, no effective Bm for weeks. CT no acute findings. Pain in abdomen out of proportion to clinical and exam findings.  Continue w bowel protocols for constipation   IVF, anti emetics   Anticipate can dc home when adequ Bms and pain pain improved.        Heroin abuse   Assessment & Plan    Advised cessation, mother reports has been to several rehab programs and walks out after several days.  Avoid IV opiates w concern for tolerance and abuse.   Increased agitation - will add methadone w concern for withdrawal.        Nausea   Assessment & Plan    Anti emetics        Leukocytosis   Assessment & Plan    This is likely reactive-- Abd exam benign, afeb. No acute resp or urinary symptoms reported  Follow clinically         Seizure disorder (HCC)- (present on admission)   Assessment & Plan    Unknown medications   Non compliant with medications according to family         Anemia   Assessment & Plan    Iron def. No symptoms of bleed  Oral iron.         Tobacco abuse   Assessment & Plan    Start nicotine patch.          Quality-Core Measures   Reviewed items::  Radiology images reviewed, Medications reviewed and Labs  reviewed  Espino catheter::  No Espino

## 2018-06-08 NOTE — PROGRESS NOTES
Patient discharged.    IV removed prior to discharge.   Prescriptions called to pharmacy   Discharge education provided, all questions answered.   Pt refused to sign discharge papers  Pt escorted out with security and all personal belongings

## 2018-06-08 NOTE — PROGRESS NOTES
Pt reported unresolved pain, but understands that we are trying to stay away from narcotics. Pt requested something to help her sleep, MD placed new order for benadryl x1.

## 2018-06-08 NOTE — DISCHARGE INSTRUCTIONS
Discharge Instructions    Discharged to other by bus with self. Discharged via walking, hospital escort: Refused.  Special equipment needed: Not Applicable    Be sure to schedule a follow-up appointment with your primary care doctor or any specialists as instructed.     Discharge Plan:   Diet Plan: Discussed  Activity Level: Discussed  Confirmed Follow up Appointment: Patient to Call and Schedule Appointment  Confirmed Symptoms Management: Discussed  Medication Reconciliation Updated: No (Comments)    I understand that a diet low in cholesterol, fat, and sodium is recommended for good health. Unless I have been given specific instructions below for another diet, I accept this instruction as my diet prescription.   Other diet: Regular     Special Instructions: None    · Is patient discharged on Warfarin / Coumadin?   No     Depression / Suicide Risk    As you are discharged from this [facility] facility, it is important to learn how to keep safe from harming yourself.    Recognize the warning signs:  · Abrupt changes in personality, positive or negative- including increase in energy   · Giving away possessions  · Change in eating patterns- significant weight changes-  positive or negative  · Change in sleeping patterns- unable to sleep or sleeping all the time   · Unwillingness or inability to communicate  · Depression  · Unusual sadness, discouragement and loneliness  · Talk of wanting to die  · Neglect of personal appearance   · Rebelliousness- reckless behavior  · Withdrawal from people/activities they love  · Confusion- inability to concentrate     If you or a loved one observes any of these behaviors or has concerns about self-harm, here's what you can do:  · Talk about it- your feelings and reasons for harming yourself  · Remove any means that you might use to hurt yourself (examples: pills, rope, extension cords, firearm)  · Get professional help from the community (Mental Health, Substance Abuse,  psychological counseling)  · Do not be alone:Call your Safe Contact- someone whom you trust who will be there for you.  · Call your local CRISIS HOTLINE 395-4075 or 515-197-0261  · Call your local Children's Mobile Crisis Response Team [place] (741) 864-1992 or www.[employer]  · Call the toll free National Suicide Prevention Hotlines   · National Suicide Prevention Lifeline 527-468-OLTX (6466)  · [employer] 800-SUICIDE (075-4472)    Constipation, Adult  Constipation is when a person:  · Poops (has a bowel movement) fewer times in a week than normal.  · Has a hard time pooping.  · Has poop that is dry, hard, or bigger than normal.  Follow these instructions at home:  Eating and drinking  · Eat foods that have a lot of fiber, such as:  ¨ Fresh fruits and vegetables.  ¨ Whole grains.  ¨ Beans.  · Eat less of foods that are high in fat, low in fiber, or overly processed, such as:  ¨ French fries.  ¨ Hamburgers.  ¨ Cookies.  ¨ Candy.  ¨ Soda.  · Drink enough fluid to keep your pee (urine) clear or pale yellow.  General instructions  · Exercise regularly or as told by your doctor.  · Go to the restroom when you feel like you need to poop. Do not hold it in.  · Take over-the-counter and prescription medicines only as told by your doctor. These include any fiber supplements.  · Do pelvic floor retraining exercises, such as:  ¨ Doing deep breathing while relaxing your lower belly (abdomen).  ¨ Relaxing your pelvic floor while pooping.  · Watch your condition for any changes.  · Keep all follow-up visits as told by your doctor. This is important.  Contact a doctor if:  · You have pain that gets worse.  · You have a fever.  · You have not pooped for 4 days.  · You throw up (vomit).  · You are not hungry.  · You lose weight.  · You are bleeding from the anus.  · You have thin, pencil-like poop (stool).  Get help right away if:  · You have a fever, and your symptoms suddenly get worse.  · You leak  poop or have blood in your poop.  · Your belly feels hard or bigger than normal (is bloated).  · You have very bad belly pain.  · You feel dizzy or you faint.  This information is not intended to replace advice given to you by your health care provider. Make sure you discuss any questions you have with your health care provider.  Document Released: 06/05/2009 Document Revised: 07/07/2017 Document Reviewed: 06/07/2017  [employer] Interactive Patient Education © 2017 [employer]

## 2018-06-08 NOTE — PROGRESS NOTES
A/Ox 4, crying, unresolved pain.  VSS.  Reports moderate abdominal pain 8 /10, medicated per MAR, heat applied.     + ROMAN, denies numbness and tingling, generalized weakness.  RA, satting >90%, denies SOB or difficulty breathing.  SCDs refused.  + normoactive BSx4, + flatus, no BM this shift, LBM 06/07 denies n/v.  Advanced to regular diet, tolerating fine.  + void, QS.  Up self, pain with movement.  Up to BR, repositions self frequently.  IVF to PIV, PO intake encouraged.     Call light within reach, calls appropriately.  Bed in lowest locked position.

## 2018-06-08 NOTE — PROGRESS NOTES
Pt smoked a cigarette while in bed. Charge RN notified, security up to speak with patient. Education provided.

## 2018-06-09 NOTE — DISCHARGE SUMMARY
Hospital Medicine Discharge Note     Admit Date:  6/6/2018       Discharge Date:   6/8/2018    Attending Physician:  Dominic Vogel M.D.      Diagnoses (includes active and resolved):     Principal Problem:    Abdominal pain POA: due to constipation    Constipation, resolved.      Active Problems:    Nausea POA: Unknown    Heroin abuse POA: Unknown    Seizure disorder (HCC) POA: Yes    Leukocytosis POA: Unknown    Tobacco abuse POA: Unknown    Anemia POA: Unknown    Medical non compliance.    Hospital Summary (Brief Narrative):         22 y.o. female hx of active heroin IVDA admitted 6/6/2018 with Nausea , Abdominal Pain. Reported she has not had a BM for weeks. CT findings of no acute intra abdominal findings. Following admission, she was given laxatives and she declined this. She did have multiple spontaneous bowel movements.  I counseled her on multiple occasions regarding cessation of heroin use and that it could cause constipation.  Liver enzymes, lipase normal, no biliary or pancreatic source. No symptoms of infection.  On re evaluation, her abdominal exam was benign.  She was more comfortable after he bowel movements.  She remained noncompliant smoking in bathroom, refusing medications and getting up. I discussed with her mother and grandmother findings and they stated they could not care for her due to small children in household.  She had tried several rehab programs but always walks out.  She was discharged to shelter with help of  with prescriptions for laxatives.      Consultants:        None     Imaging/ Testing:      CT-ABDOMEN-PELVIS WITH   Final Result      Unremarkable contrast enhanced CT scan of the abdomen and pelvis.            Procedures:          None     Discharge Medications:             Medication List      START taking these medications      Instructions   polyethylene glycol/lytes Pack  Commonly known as:  MIRALAX   Take 1 Packet by mouth 1 time daily as needed  (constipation).  Dose:  17 g     senna-docusate 8.6-50 MG Tabs  Commonly known as:  PERICOLACE or SENOKOT S   Take 2 Tabs by mouth 2 times a day as needed.  Dose:  2 Tab               Diet:       DIET ORDERS (Through next 24h)    None            Activity:   As tolerated.      Code status:   Full code    Primary Care Provider:    Pcp Pt States None    Follow up appointment details :      .  Primary Care Provider as instructed.     In 1 week                Time spent on discharge day patient visit: 30 minutes    #################################################

## 2019-02-06 PROCEDURE — 99285 EMERGENCY DEPT VISIT HI MDM: CPT

## 2019-02-07 ENCOUNTER — HOSPITAL ENCOUNTER (EMERGENCY)
Facility: MEDICAL CENTER | Age: 24
End: 2019-02-07
Attending: EMERGENCY MEDICINE
Payer: COMMERCIAL

## 2019-02-07 ENCOUNTER — APPOINTMENT (OUTPATIENT)
Dept: RADIOLOGY | Facility: MEDICAL CENTER | Age: 24
End: 2019-02-07
Attending: EMERGENCY MEDICINE
Payer: COMMERCIAL

## 2019-02-07 VITALS
BODY MASS INDEX: 19.77 KG/M2 | DIASTOLIC BLOOD PRESSURE: 79 MMHG | SYSTOLIC BLOOD PRESSURE: 139 MMHG | HEART RATE: 83 BPM | RESPIRATION RATE: 16 BRPM | OXYGEN SATURATION: 96 % | WEIGHT: 123.02 LBS | TEMPERATURE: 98.5 F | HEIGHT: 66 IN

## 2019-02-07 DIAGNOSIS — K22.6 MALLORY-WEISS TEAR: ICD-10-CM

## 2019-02-07 DIAGNOSIS — K59.00 CONSTIPATION, UNSPECIFIED CONSTIPATION TYPE: ICD-10-CM

## 2019-02-07 DIAGNOSIS — R10.9 ABDOMINAL PAIN, UNSPECIFIED ABDOMINAL LOCATION: ICD-10-CM

## 2019-02-07 LAB
ALBUMIN SERPL BCP-MCNC: 4.5 G/DL (ref 3.2–4.9)
ALBUMIN/GLOB SERPL: 1 G/DL
ALP SERPL-CCNC: 103 U/L (ref 30–99)
ALT SERPL-CCNC: 211 U/L (ref 2–50)
ANION GAP SERPL CALC-SCNC: 9 MMOL/L (ref 0–11.9)
APTT PPP: 30.2 SEC (ref 24.7–36)
AST SERPL-CCNC: 149 U/L (ref 12–45)
BASOPHILS # BLD AUTO: 0.1 % (ref 0–1.8)
BASOPHILS # BLD: 0.01 K/UL (ref 0–0.12)
BILIRUB SERPL-MCNC: 0.6 MG/DL (ref 0.1–1.5)
BUN SERPL-MCNC: 16 MG/DL (ref 8–22)
CALCIUM SERPL-MCNC: 9.8 MG/DL (ref 8.5–10.5)
CHLORIDE SERPL-SCNC: 99 MMOL/L (ref 96–112)
CO2 SERPL-SCNC: 30 MMOL/L (ref 20–33)
CREAT SERPL-MCNC: 0.59 MG/DL (ref 0.5–1.4)
EOSINOPHIL # BLD AUTO: 0.05 K/UL (ref 0–0.51)
EOSINOPHIL NFR BLD: 0.7 % (ref 0–6.9)
ERYTHROCYTE [DISTWIDTH] IN BLOOD BY AUTOMATED COUNT: 42 FL (ref 35.9–50)
GLOBULIN SER CALC-MCNC: 4.4 G/DL (ref 1.9–3.5)
GLUCOSE SERPL-MCNC: 88 MG/DL (ref 65–99)
HAV IGM SERPL QL IA: NEGATIVE
HBV CORE IGM SER QL: NEGATIVE
HBV SURFACE AG SER QL: NEGATIVE
HCG SERPL QL: NEGATIVE
HCT VFR BLD AUTO: 40.8 % (ref 37–47)
HCV AB SER QL: REACTIVE
HGB BLD-MCNC: 13.7 G/DL (ref 12–16)
IMM GRANULOCYTES # BLD AUTO: 0.02 K/UL (ref 0–0.11)
IMM GRANULOCYTES NFR BLD AUTO: 0.3 % (ref 0–0.9)
INR PPP: 1.1 (ref 0.87–1.13)
LIPASE SERPL-CCNC: 4 U/L (ref 11–82)
LYMPHOCYTES # BLD AUTO: 2.64 K/UL (ref 1–4.8)
LYMPHOCYTES NFR BLD: 35.2 % (ref 22–41)
MCH RBC QN AUTO: 28.6 PG (ref 27–33)
MCHC RBC AUTO-ENTMCNC: 33.6 G/DL (ref 33.6–35)
MCV RBC AUTO: 85.2 FL (ref 81.4–97.8)
MONOCYTES # BLD AUTO: 0.34 K/UL (ref 0–0.85)
MONOCYTES NFR BLD AUTO: 4.5 % (ref 0–13.4)
NEUTROPHILS # BLD AUTO: 4.44 K/UL (ref 2–7.15)
NEUTROPHILS NFR BLD: 59.2 % (ref 44–72)
NRBC # BLD AUTO: 0 K/UL
NRBC BLD-RTO: 0 /100 WBC
PLATELET # BLD AUTO: 333 K/UL (ref 164–446)
PMV BLD AUTO: 9.5 FL (ref 9–12.9)
POTASSIUM SERPL-SCNC: 4.1 MMOL/L (ref 3.6–5.5)
PROT SERPL-MCNC: 8.9 G/DL (ref 6–8.2)
PROTHROMBIN TIME: 14.4 SEC (ref 12–14.6)
RBC # BLD AUTO: 4.79 M/UL (ref 4.2–5.4)
SODIUM SERPL-SCNC: 138 MMOL/L (ref 135–145)
WBC # BLD AUTO: 7.5 K/UL (ref 4.8–10.8)

## 2019-02-07 PROCEDURE — 80074 ACUTE HEPATITIS PANEL: CPT

## 2019-02-07 PROCEDURE — 700105 HCHG RX REV CODE 258: Performed by: EMERGENCY MEDICINE

## 2019-02-07 PROCEDURE — 85025 COMPLETE CBC W/AUTO DIFF WBC: CPT

## 2019-02-07 PROCEDURE — 83690 ASSAY OF LIPASE: CPT

## 2019-02-07 PROCEDURE — 700111 HCHG RX REV CODE 636 W/ 250 OVERRIDE (IP)

## 2019-02-07 PROCEDURE — 87522 HEPATITIS C REVRS TRNSCRPJ: CPT

## 2019-02-07 PROCEDURE — 700117 HCHG RX CONTRAST REV CODE 255: Performed by: EMERGENCY MEDICINE

## 2019-02-07 PROCEDURE — 85610 PROTHROMBIN TIME: CPT

## 2019-02-07 PROCEDURE — 74177 CT ABD & PELVIS W/CONTRAST: CPT

## 2019-02-07 PROCEDURE — 85730 THROMBOPLASTIN TIME PARTIAL: CPT

## 2019-02-07 PROCEDURE — 80053 COMPREHEN METABOLIC PANEL: CPT

## 2019-02-07 PROCEDURE — 96375 TX/PRO/DX INJ NEW DRUG ADDON: CPT

## 2019-02-07 PROCEDURE — 71046 X-RAY EXAM CHEST 2 VIEWS: CPT

## 2019-02-07 PROCEDURE — 84703 CHORIONIC GONADOTROPIN ASSAY: CPT

## 2019-02-07 PROCEDURE — 96374 THER/PROPH/DIAG INJ IV PUSH: CPT | Mod: XU

## 2019-02-07 RX ORDER — ONDANSETRON 2 MG/ML
INJECTION INTRAMUSCULAR; INTRAVENOUS
Status: COMPLETED
Start: 2019-02-07 | End: 2019-02-07

## 2019-02-07 RX ORDER — ONDANSETRON 4 MG/1
4 TABLET, ORALLY DISINTEGRATING ORAL EVERY 6 HOURS PRN
Qty: 10 TAB | Refills: 0 | Status: SHIPPED | OUTPATIENT
Start: 2019-02-07 | End: 2019-07-24

## 2019-02-07 RX ORDER — MORPHINE SULFATE 4 MG/ML
INJECTION, SOLUTION INTRAMUSCULAR; INTRAVENOUS
Status: COMPLETED
Start: 2019-02-07 | End: 2019-02-07

## 2019-02-07 RX ORDER — MORPHINE SULFATE 4 MG/ML
4 INJECTION, SOLUTION INTRAMUSCULAR; INTRAVENOUS ONCE
Status: COMPLETED | OUTPATIENT
Start: 2019-02-07 | End: 2019-02-07

## 2019-02-07 RX ORDER — SODIUM CHLORIDE 9 MG/ML
1000 INJECTION, SOLUTION INTRAVENOUS ONCE
Status: COMPLETED | OUTPATIENT
Start: 2019-02-07 | End: 2019-02-07

## 2019-02-07 RX ORDER — ONDANSETRON 2 MG/ML
4 INJECTION INTRAMUSCULAR; INTRAVENOUS ONCE
Status: COMPLETED | OUTPATIENT
Start: 2019-02-07 | End: 2019-02-07

## 2019-02-07 RX ORDER — POLYETHYLENE GLYCOL 3350 17 G/17G
17 POWDER, FOR SOLUTION ORAL DAILY
Qty: 1 BOTTLE | Refills: 0 | Status: SHIPPED | OUTPATIENT
Start: 2019-02-07 | End: 2019-07-24

## 2019-02-07 RX ADMIN — MORPHINE SULFATE 4 MG: 4 INJECTION, SOLUTION INTRAMUSCULAR; INTRAVENOUS at 01:05

## 2019-02-07 RX ADMIN — ONDANSETRON 4 MG: 2 INJECTION INTRAMUSCULAR; INTRAVENOUS at 01:05

## 2019-02-07 RX ADMIN — IOHEXOL 80 ML: 350 INJECTION, SOLUTION INTRAVENOUS at 02:45

## 2019-02-07 RX ADMIN — SODIUM CHLORIDE 1000 ML: 9 INJECTION, SOLUTION INTRAVENOUS at 01:25

## 2019-02-07 RX ADMIN — MORPHINE SULFATE 4 MG: 4 INJECTION INTRAVENOUS at 01:05

## 2019-02-07 NOTE — ED TRIAGE NOTES
"Zeina De La Cruzlin  23 y.o. female  Chief Complaint   Patient presents with   • N/V     X 10 days, +constipation   • Epigastric Pain     Pt states pain from throwing up so often       Pt amb to triage with steady gait for above complaint.   Pt is alert and oriented, speaking in full sentences, follows commands and responds appropriately to questions. Resp are even and unlabored.  Pt placed in lobby. Pt educated on triage process. Pt encouraged to alert staff for any changes.  /79   Pulse (!) 109   Temp 36.9 °C (98.5 °F) (Oral)   Resp 18   Ht 1.676 m (5' 6\")   Wt 55.8 kg (123 lb 0.3 oz)   SpO2 100%   BMI 19.86 kg/m²     "

## 2019-02-07 NOTE — ED PROVIDER NOTES
ED Provider Note    ED Provider Note      Primary care provider: Pcp Pt States None    CHIEF COMPLAINT  Chief Complaint   Patient presents with   • N/V     X 10 days, +constipation   • Epigastric Pain     Pt states pain from throwing up so often       XIAO Cantu is a 23 y.o. female who presents to the Emergency Department with chief complaint of nausea and vomiting.  Patient reports that she had nausea and vomiting for 10 days she has had some slight streaking of blood within her vomit today.  She also reports profound constipation over the last 10 days.  Continues to pass gas.  She said chills without measured fever no headache altered mental status no neck pain no cough congestion chest pain or shortness of breath.    REVIEW OF SYSTEMS  10 systems reviewed and otherwise negative, pertinent positives and negatives listed in the history of present illness.    PAST MEDICAL HISTORY   has a past medical history of Anxiety; Bacterial vaginosis; Depression; Epilepsy (HCC); Fx clavicle (right); Hepatitis C; Migraine; PID (pelvic inflammatory disease); Psychiatric disorder; and Substance abuse (HCC).    SURGICAL HISTORY   has a past surgical history that includes other; other abdominal surgery; gyn surgery; and tonsillectomy.    SOCIAL HISTORY  Social History   Substance Use Topics   • Smoking status: Current Every Day Smoker     Packs/day: 1.00     Years: 8.00     Types: Cigarettes   • Smokeless tobacco: Never Used   • Alcohol use Yes      Comment: 2x weekly      History   Drug Use   • Types: Inhaled, Intravenous     Comment:  marijuana, heroin every 2hrs per pt       FAMILY HISTORY  Non-Contributory    CURRENT MEDICATIONS  Home Medications    **Home medications have not yet been reviewed for this encounter**         ALLERGIES  Allergies   Allergen Reactions   • Morphine Shortness of Breath     States went unconscious   • Other Drug      PT STATES SHE DOESN'T TOLERATE THE SEIZURE MEDS AND HAS TOO MANY SIDE  "EFFECTS   • Latex        PHYSICAL EXAM  VITAL SIGNS: /79   Pulse (!) 109   Temp 36.9 °C (98.5 °F) (Oral)   Resp 18   Ht 1.676 m (5' 6\")   Wt 55.8 kg (123 lb 0.3 oz)   SpO2 100%   BMI 19.86 kg/m²   Pulse ox interpretation: I interpret this pulse ox as normal.  Constitutional: Alert and oriented x 3, Distress  HEENT: Atraumatic normocephalic, pupils are equal round reactive to light extraocular movements are intact. The nares is clear, external ears are normal, mouth shows dry mucous membranes  Neck: Supple, no JVD no tracheal deviation  Cardiovascular: Tachycardic no murmur rub or gallop 2+ pulses peripherally x4  Thorax & Lungs: No respiratory distress, no wheezes rales or rhonchi, No chest tenderness.   GI: Minimal diffuse tenderness no localization no rebound no guarding positive bowel sounds  Skin: Warm dry no acute rash or lesion  Musculoskeletal: Moving all extremities with full range and 5 of 5 strength, no acute  deformity  Neurologic: Cranial nerves III through XII are grossly intact, no sensory deficit, no cerebellar dysfunction   Psychiatric: Appropriate affect for situation at this time      DIAGNOSTIC STUDIES / PROCEDURES  LABS      Results for orders placed or performed during the hospital encounter of 02/07/19   CBC WITH DIFFERENTIAL   Result Value Ref Range    WBC 7.5 4.8 - 10.8 K/uL    RBC 4.79 4.20 - 5.40 M/uL    Hemoglobin 13.7 12.0 - 16.0 g/dL    Hematocrit 40.8 37.0 - 47.0 %    MCV 85.2 81.4 - 97.8 fL    MCH 28.6 27.0 - 33.0 pg    MCHC 33.6 33.6 - 35.0 g/dL    RDW 42.0 35.9 - 50.0 fL    Platelet Count 333 164 - 446 K/uL    MPV 9.5 9.0 - 12.9 fL    Neutrophils-Polys 59.20 44.00 - 72.00 %    Lymphocytes 35.20 22.00 - 41.00 %    Monocytes 4.50 0.00 - 13.40 %    Eosinophils 0.70 0.00 - 6.90 %    Basophils 0.10 0.00 - 1.80 %    Immature Granulocytes 0.30 0.00 - 0.90 %    Nucleated RBC 0.00 /100 WBC    Neutrophils (Absolute) 4.44 2.00 - 7.15 K/uL    Lymphs (Absolute) 2.64 1.00 - 4.80 K/uL "    Monos (Absolute) 0.34 0.00 - 0.85 K/uL    Eos (Absolute) 0.05 0.00 - 0.51 K/uL    Baso (Absolute) 0.01 0.00 - 0.12 K/uL    Immature Granulocytes (abs) 0.02 0.00 - 0.11 K/uL    NRBC (Absolute) 0.00 K/uL   COMP METABOLIC PANEL   Result Value Ref Range    Sodium 138 135 - 145 mmol/L    Potassium 4.1 3.6 - 5.5 mmol/L    Chloride 99 96 - 112 mmol/L    Co2 30 20 - 33 mmol/L    Anion Gap 9.0 0.0 - 11.9    Glucose 88 65 - 99 mg/dL    Bun 16 8 - 22 mg/dL    Creatinine 0.59 0.50 - 1.40 mg/dL    Calcium 9.8 8.5 - 10.5 mg/dL    AST(SGOT) 149 (H) 12 - 45 U/L    ALT(SGPT) 211 (H) 2 - 50 U/L    Alkaline Phosphatase 103 (H) 30 - 99 U/L    Total Bilirubin 0.6 0.1 - 1.5 mg/dL    Albumin 4.5 3.2 - 4.9 g/dL    Total Protein 8.9 (H) 6.0 - 8.2 g/dL    Globulin 4.4 (H) 1.9 - 3.5 g/dL    A-G Ratio 1.0 g/dL   LIPASE   Result Value Ref Range    Lipase 4 (L) 11 - 82 U/L   PTT   Result Value Ref Range    APTT 30.2 24.7 - 36.0 sec   PT/INR   Result Value Ref Range    PT 14.4 12.0 - 14.6 sec    INR 1.10 0.87 - 1.13   HCG QUAL SERUM   Result Value Ref Range    Beta-Hcg Qualitative Serum Negative Negative   ESTIMATED GFR   Result Value Ref Range    GFR If African American >60 >60 mL/min/1.73 m 2    GFR If Non African American >60 >60 mL/min/1.73 m 2       All labs reviewed by me.      RADIOLOGY  DX-CHEST-2 VIEWS   Final Result         1.  No acute cardiopulmonary disease.      CT-ABDOMEN-PELVIS WITH   Final Result         1.  No acute abnormality.   2.  Moderate stool in the colon favors changes of constipation.   3.  Hepatomegaly        The radiologist's interpretation of all radiological studies have been reviewed by me.    COURSE & MEDICAL DECISION MAKING  Pertinent Labs & Imaging studies reviewed. (See chart for details)    12:44 AM - Patient seen and examined at bedside.          Patient noted to have slightly elevated blood pressure likely circumstantial secondary to presenting complaint. Referred to primary care physician for further  "evaluation.     Patient was given IV fluids based on tachycardia dry mucous membranes, oral hydration was not attempted due to inability to tolerate p.o. and insufficiency for hydration, after fluids had improvement of symptoms resolution of tachycardia    Medical Decision Making: Patient's had no further hemoptysis since presentation chest x-ray and CT scan of the abdomen and pelvis demonstrates no free air no other acute inflammatory changes does demonstrate some moderate constipation.  Patient with labs as above normal with the exception of slight elevation of transaminases patient has known hep C however on review her previous Dunia panels have all had normal transaminases at this hospital.  Patient to follow-up with [facility] for establishment of primary care repeat evaluation of slight elevation of her liver enzymes.  I offered patient admission to complete this and patient denied stating that she had absolutely no desire to stay in the hospital and was feeling much better at this point.  Patient given prescription for Zofran given prescription for MiraLAX instructed to follow-up as above return immediately for any worsening pain worsening hematemesis blood in stool any other acute symptoms or concerns otherwise discharged in stable and improved condition.      /79   Pulse 83   Temp 36.9 °C (98.5 °F) (Oral)   Resp 16   Ht 1.676 m (5' 6\")   Wt 55.8 kg (123 lb 0.3 oz)   SpO2 96%   BMI 19.86 kg/m²     [address] [id number] [phone]  Schedule an appointment as soon as possible for a visit   for establishment of primary care, for re-check of liver enzymes      Discharge Medication List as of 2/7/2019  4:25 AM      START taking these medications    Details   ondansetron (ZOFRAN ODT) 4 MG TABLET DISPERSIBLE Take 1 Tab by mouth every 6 hours as needed., Disp-10 Tab, R-0, Print Rx Paper      polyethylene glycol 3350 (MIRALAX) Powder Take 17 g by mouth every " day., Disp-1 Bottle, R-0, Print Rx Paper             FINAL IMPRESSION  1. Abdominal pain, unspecified abdominal location Active   2. Abdominal pain, unspecified abdominal location    3. Constipation, unspecified constipation type Active   4. Estela-Johnson tear Active    5.  Mild transaminitis      This dictation has been created using voice recognition software and/or scribes. The accuracy of the dictation is limited by the abilities of the software and the expertise of the scribes. I expect there may be some errors of grammar and possibly content. I made every attempt to manually correct the errors within my dictation. However, errors related to voice recognition software and/or scribes may still exist and should be interpreted within the appropriate context.

## 2019-02-10 LAB
HCV RNA SERPL NAA+PROBE-ACNC: 197 IU/ML
HCV RNA SERPL NAA+PROBE-LOG IU: 2.29 LOG IU/ML
HCV RNA SERPL QL NAA+PROBE: DETECTED

## 2019-07-24 ENCOUNTER — HOSPITAL ENCOUNTER (OUTPATIENT)
Facility: MEDICAL CENTER | Age: 24
End: 2019-07-26
Attending: EMERGENCY MEDICINE | Admitting: HOSPITALIST
Payer: MEDICAID

## 2019-07-24 ENCOUNTER — APPOINTMENT (OUTPATIENT)
Dept: RADIOLOGY | Facility: MEDICAL CENTER | Age: 24
End: 2019-07-24
Attending: EMERGENCY MEDICINE
Payer: MEDICAID

## 2019-07-24 ENCOUNTER — HOSPITAL ENCOUNTER (EMERGENCY)
Facility: MEDICAL CENTER | Age: 24
End: 2019-07-24
Attending: EMERGENCY MEDICINE
Payer: MEDICAID

## 2019-07-24 VITALS
HEART RATE: 59 BPM | BODY MASS INDEX: 22.49 KG/M2 | OXYGEN SATURATION: 95 % | RESPIRATION RATE: 16 BRPM | SYSTOLIC BLOOD PRESSURE: 108 MMHG | DIASTOLIC BLOOD PRESSURE: 74 MMHG | WEIGHT: 135 LBS | HEIGHT: 65 IN

## 2019-07-24 DIAGNOSIS — R20.2 PARESTHESIA: ICD-10-CM

## 2019-07-24 DIAGNOSIS — Z87.828 H/O RECENT TRAUMA: ICD-10-CM

## 2019-07-24 DIAGNOSIS — R40.4 TRANSIENT ALTERATION OF AWARENESS: ICD-10-CM

## 2019-07-24 DIAGNOSIS — T07.XXXA MULTIPLE CONTUSIONS: ICD-10-CM

## 2019-07-24 DIAGNOSIS — R32 URINARY INCONTINENCE, UNSPECIFIED TYPE: ICD-10-CM

## 2019-07-24 DIAGNOSIS — R56.9 SEIZURE (HCC): ICD-10-CM

## 2019-07-24 DIAGNOSIS — F19.10 POLYSUBSTANCE ABUSE (HCC): ICD-10-CM
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
## 2019-07-24 LAB
ALBUMIN SERPL BCP-MCNC: 4 G/DL (ref 3.2–4.9)
ALBUMIN/GLOB SERPL: 1 G/DL
ALP SERPL-CCNC: 101 U/L (ref 30–99)
ALT SERPL-CCNC: 24 U/L (ref 2–50)
ANION GAP SERPL CALC-SCNC: 9 MMOL/L (ref 0–11.9)
AST SERPL-CCNC: 28 U/L (ref 12–45)
BASOPHILS # BLD AUTO: 0.1 % (ref 0–1.8)
BASOPHILS # BLD: 0.01 K/UL (ref 0–0.12)
BILIRUB SERPL-MCNC: 0.9 MG/DL (ref 0.1–1.5)
BUN SERPL-MCNC: 12 MG/DL (ref 8–22)
CALCIUM SERPL-MCNC: 9.1 MG/DL (ref 8.5–10.5)
CHLORIDE SERPL-SCNC: 104 MMOL/L (ref 96–112)
CO2 SERPL-SCNC: 23 MMOL/L (ref 20–33)
CREAT SERPL-MCNC: 0.49 MG/DL (ref 0.5–1.4)
EOSINOPHIL # BLD AUTO: 0.02 K/UL (ref 0–0.51)
EOSINOPHIL NFR BLD: 0.3 % (ref 0–6.9)
ERYTHROCYTE [DISTWIDTH] IN BLOOD BY AUTOMATED COUNT: 40.8 FL (ref 35.9–50)
GLOBULIN SER CALC-MCNC: 4.1 G/DL (ref 1.9–3.5)
GLUCOSE SERPL-MCNC: 96 MG/DL (ref 65–99)
HCT VFR BLD AUTO: 38.8 % (ref 37–47)
HGB BLD-MCNC: 13.4 G/DL (ref 12–16)
IMM GRANULOCYTES # BLD AUTO: 0.01 K/UL (ref 0–0.11)
IMM GRANULOCYTES NFR BLD AUTO: 0.1 % (ref 0–0.9)
LYMPHOCYTES # BLD AUTO: 1.75 K/UL (ref 1–4.8)
LYMPHOCYTES NFR BLD: 25.5 % (ref 22–41)
MCH RBC QN AUTO: 29 PG (ref 27–33)
MCHC RBC AUTO-ENTMCNC: 34.5 G/DL (ref 33.6–35)
MCV RBC AUTO: 84 FL (ref 81.4–97.8)
MONOCYTES # BLD AUTO: 0.27 K/UL (ref 0–0.85)
MONOCYTES NFR BLD AUTO: 3.9 % (ref 0–13.4)
NEUTROPHILS # BLD AUTO: 4.8 K/UL (ref 2–7.15)
NEUTROPHILS NFR BLD: 70.1 % (ref 44–72)
NRBC # BLD AUTO: 0 K/UL
NRBC BLD-RTO: 0 /100 WBC
PLATELET # BLD AUTO: 325 K/UL (ref 164–446)
PMV BLD AUTO: 8.8 FL (ref 9–12.9)
POC BREATHALIZER: 0 PERCENT (ref 0–0.01)
POTASSIUM SERPL-SCNC: 3.9 MMOL/L (ref 3.6–5.5)
PROT SERPL-MCNC: 8.1 G/DL (ref 6–8.2)
RBC # BLD AUTO: 4.62 M/UL (ref 4.2–5.4)
SODIUM SERPL-SCNC: 136 MMOL/L (ref 135–145)
WBC # BLD AUTO: 6.9 K/UL (ref 4.8–10.8)

## 2019-07-24 PROCEDURE — A9270 NON-COVERED ITEM OR SERVICE: HCPCS | Performed by: EMERGENCY MEDICINE

## 2019-07-24 PROCEDURE — 85025 COMPLETE CBC W/AUTO DIFF WBC: CPT

## 2019-07-24 PROCEDURE — 700102 HCHG RX REV CODE 250 W/ 637 OVERRIDE(OP): Performed by: EMERGENCY MEDICINE

## 2019-07-24 PROCEDURE — 80307 DRUG TEST PRSMV CHEM ANLYZR: CPT

## 2019-07-24 PROCEDURE — 72125 CT NECK SPINE W/O DYE: CPT

## 2019-07-24 PROCEDURE — 99285 EMERGENCY DEPT VISIT HI MDM: CPT

## 2019-07-24 PROCEDURE — 70450 CT HEAD/BRAIN W/O DYE: CPT

## 2019-07-24 PROCEDURE — 80053 COMPREHEN METABOLIC PANEL: CPT

## 2019-07-24 PROCEDURE — 70486 CT MAXILLOFACIAL W/O DYE: CPT

## 2019-07-24 PROCEDURE — 82550 ASSAY OF CK (CPK): CPT

## 2019-07-24 PROCEDURE — 72128 CT CHEST SPINE W/O DYE: CPT

## 2019-07-24 PROCEDURE — 302970 POC BREATHALIZER: Performed by: EMERGENCY MEDICINE

## 2019-07-24 PROCEDURE — 84703 CHORIONIC GONADOTROPIN ASSAY: CPT

## 2019-07-24 RX ORDER — LEVETIRACETAM 500 MG/1
500 TABLET ORAL ONCE
Status: COMPLETED | OUTPATIENT
Start: 2019-07-24 | End: 2019-07-24

## 2019-07-24 RX ORDER — LEVETIRACETAM 500 MG/1
500 TABLET ORAL 2 TIMES DAILY
Qty: 60 TAB | Refills: 0 | Status: ON HOLD | OUTPATIENT
Start: 2019-07-24 | End: 2019-07-25

## 2019-07-24 RX ADMIN — LEVETIRACETAM 500 MG: 500 TABLET, FILM COATED ORAL at 17:25

## 2019-07-24 RX ADMIN — LEVETIRACETAM 500 MG: 500 TABLET ORAL at 05:18
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
## 2019-07-24 NOTE — ED NOTES
Attempted to draw blood from patient.  She is uncooperative, has inflamed areas that appear to be from previous pokes on her lt ac and left hand.  Phlebotomist called to see if they can draw blood.
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
## 2019-07-24 NOTE — ED NOTES
"[facility] team at bedside along with Pt's mother.  Pt states towards mother \" get away from me, go back to your piece of shit . \"    "

## 2019-07-24 NOTE — ED NOTES
Report received from Tracey HORN, assumed care of patient.  Call light and belongings within reach.  Bed in lowest position.
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
## 2019-07-24 NOTE — ED NOTES
All lines and monitors disconnected.  Discharge instructions reviewed, questions answered.  Pt declines to sign discharge instructions.  Pt transported to [facility] via wheelchair, escorted by RN.  Pt provided with prescriptions X 1.  Pt states all belongings in possession.
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
## 2019-07-24 NOTE — ED NOTES
Patient refusing all interventions, crying and hysterical at this time. ERP notified and at bedside discussing poc with patient

## 2019-07-25 ENCOUNTER — APPOINTMENT (OUTPATIENT)
Dept: RADIOLOGY | Facility: MEDICAL CENTER | Age: 24
End: 2019-07-25
Attending: EMERGENCY MEDICINE
Payer: MEDICAID

## 2019-07-25 PROBLEM — R32 URINARY INCONTINENCE: Status: ACTIVE | Noted: 2019-07-25

## 2019-07-25 PROBLEM — R32 URINARY INCONTINENCE: Status: RESOLVED | Noted: 2019-07-25 | Resolved: 2019-07-25
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
## 2019-07-25 LAB
CK SERPL-CCNC: 205 U/L (ref 0–154)
CRP SERPL HS-MCNC: 0.79 MG/DL (ref 0–0.75)
ERYTHROCYTE [SEDIMENTATION RATE] IN BLOOD BY WESTERGREN METHOD: 20 MM/HOUR (ref 0–20)
ETHANOL BLD-MCNC: 0 G/DL
HCG SERPL QL: NEGATIVE
HIV 1+2 AB+HIV1 P24 AG SERPL QL IA: NON REACTIVE

## 2019-07-25 PROCEDURE — 700117 HCHG RX CONTRAST REV CODE 255: Performed by: EMERGENCY MEDICINE

## 2019-07-25 PROCEDURE — 96374 THER/PROPH/DIAG INJ IV PUSH: CPT | Mod: XU

## 2019-07-25 PROCEDURE — 700102 HCHG RX REV CODE 250 W/ 637 OVERRIDE(OP): Performed by: HOSPITALIST

## 2019-07-25 PROCEDURE — 700105 HCHG RX REV CODE 258: Performed by: EMERGENCY MEDICINE

## 2019-07-25 PROCEDURE — 96375 TX/PRO/DX INJ NEW DRUG ADDON: CPT | Mod: XU

## 2019-07-25 PROCEDURE — 700105 HCHG RX REV CODE 258: Performed by: HOSPITALIST

## 2019-07-25 PROCEDURE — G0378 HOSPITAL OBSERVATION PER HR: HCPCS

## 2019-07-25 PROCEDURE — 700111 HCHG RX REV CODE 636 W/ 250 OVERRIDE (IP): Performed by: EMERGENCY MEDICINE

## 2019-07-25 PROCEDURE — A9585 GADOBUTROL INJECTION: HCPCS | Performed by: EMERGENCY MEDICINE

## 2019-07-25 PROCEDURE — 72131 CT LUMBAR SPINE W/O DYE: CPT

## 2019-07-25 PROCEDURE — 99220 PR INITIAL OBSERVATION CARE,LEVL III: CPT | Performed by: HOSPITALIST

## 2019-07-25 PROCEDURE — A9270 NON-COVERED ITEM OR SERVICE: HCPCS | Performed by: HOSPITALIST

## 2019-07-25 PROCEDURE — 80177 DRUG SCRN QUAN LEVETIRACETAM: CPT

## 2019-07-25 PROCEDURE — 85652 RBC SED RATE AUTOMATED: CPT

## 2019-07-25 PROCEDURE — 700111 HCHG RX REV CODE 636 W/ 250 OVERRIDE (IP): Performed by: HOSPITALIST

## 2019-07-25 PROCEDURE — 87040 BLOOD CULTURE FOR BACTERIA: CPT

## 2019-07-25 PROCEDURE — 36415 COLL VENOUS BLD VENIPUNCTURE: CPT

## 2019-07-25 PROCEDURE — 87389 HIV-1 AG W/HIV-1&-2 AB AG IA: CPT

## 2019-07-25 PROCEDURE — 86140 C-REACTIVE PROTEIN: CPT

## 2019-07-25 PROCEDURE — 72158 MRI LUMBAR SPINE W/O & W/DYE: CPT

## 2019-07-25 RX ORDER — ONDANSETRON 2 MG/ML
4 INJECTION INTRAMUSCULAR; INTRAVENOUS EVERY 4 HOURS PRN
Status: DISCONTINUED | OUTPATIENT
Start: 2019-07-25 | End: 2019-07-26 | Stop reason: HOSPADM

## 2019-07-25 RX ORDER — METHADONE HYDROCHLORIDE 5 MG/1
5 TABLET ORAL EVERY 8 HOURS
Status: DISCONTINUED | OUTPATIENT
Start: 2019-07-25 | End: 2019-07-25

## 2019-07-25 RX ORDER — PROMETHAZINE HYDROCHLORIDE 25 MG/1
12.5-25 TABLET ORAL EVERY 4 HOURS PRN
Status: DISCONTINUED | OUTPATIENT
Start: 2019-07-25 | End: 2019-07-25

## 2019-07-25 RX ORDER — AMOXICILLIN 250 MG
2 CAPSULE ORAL 2 TIMES DAILY
Status: DISCONTINUED | OUTPATIENT
Start: 2019-07-25 | End: 2019-07-26 | Stop reason: HOSPADM

## 2019-07-25 RX ORDER — LORAZEPAM 2 MG/ML
1 INJECTION INTRAMUSCULAR
Status: DISCONTINUED | OUTPATIENT
Start: 2019-07-25 | End: 2019-07-26 | Stop reason: HOSPADM

## 2019-07-25 RX ORDER — ACETAMINOPHEN 325 MG/1
650 TABLET ORAL EVERY 6 HOURS PRN
Status: DISCONTINUED | OUTPATIENT
Start: 2019-07-25 | End: 2019-07-26 | Stop reason: HOSPADM

## 2019-07-25 RX ORDER — OXYCODONE HYDROCHLORIDE 5 MG/1
5 TABLET ORAL
Status: DISCONTINUED | OUTPATIENT
Start: 2019-07-25 | End: 2019-07-25

## 2019-07-25 RX ORDER — LORAZEPAM 2 MG/ML
1 INJECTION INTRAMUSCULAR ONCE
Status: COMPLETED | OUTPATIENT
Start: 2019-07-25 | End: 2019-07-25

## 2019-07-25 RX ORDER — LEVETIRACETAM 500 MG/1
500 TABLET ORAL 2 TIMES DAILY
Status: DISCONTINUED | OUTPATIENT
Start: 2019-07-25 | End: 2019-07-26 | Stop reason: HOSPADM

## 2019-07-25 RX ORDER — BISACODYL 10 MG
10 SUPPOSITORY, RECTAL RECTAL
Status: DISCONTINUED | OUTPATIENT
Start: 2019-07-25 | End: 2019-07-26 | Stop reason: HOSPADM

## 2019-07-25 RX ORDER — MORPHINE SULFATE 4 MG/ML
2 INJECTION, SOLUTION INTRAMUSCULAR; INTRAVENOUS
Status: DISCONTINUED | OUTPATIENT
Start: 2019-07-25 | End: 2019-07-25

## 2019-07-25 RX ORDER — GADOBUTROL 604.72 MG/ML
6 INJECTION INTRAVENOUS ONCE
Status: COMPLETED | OUTPATIENT
Start: 2019-07-25 | End: 2019-07-25

## 2019-07-25 RX ORDER — SODIUM CHLORIDE, SODIUM LACTATE, POTASSIUM CHLORIDE, CALCIUM CHLORIDE 600; 310; 30; 20 MG/100ML; MG/100ML; MG/100ML; MG/100ML
INJECTION, SOLUTION INTRAVENOUS CONTINUOUS
Status: DISCONTINUED | OUTPATIENT
Start: 2019-07-25 | End: 2019-07-25

## 2019-07-25 RX ORDER — CLONIDINE HYDROCHLORIDE 0.1 MG/1
0.1 TABLET ORAL EVERY 6 HOURS PRN
Status: DISCONTINUED | OUTPATIENT
Start: 2019-07-25 | End: 2019-07-26 | Stop reason: HOSPADM

## 2019-07-25 RX ORDER — PROMETHAZINE HYDROCHLORIDE 25 MG/1
12.5-25 SUPPOSITORY RECTAL EVERY 4 HOURS PRN
Status: DISCONTINUED | OUTPATIENT
Start: 2019-07-25 | End: 2019-07-25

## 2019-07-25 RX ORDER — SODIUM CHLORIDE, SODIUM LACTATE, POTASSIUM CHLORIDE, CALCIUM CHLORIDE 600; 310; 30; 20 MG/100ML; MG/100ML; MG/100ML; MG/100ML
1000 INJECTION, SOLUTION INTRAVENOUS ONCE
Status: COMPLETED | OUTPATIENT
Start: 2019-07-25 | End: 2019-07-25

## 2019-07-25 RX ORDER — OXYCODONE HYDROCHLORIDE 5 MG/1
2.5 TABLET ORAL
Status: DISCONTINUED | OUTPATIENT
Start: 2019-07-25 | End: 2019-07-25

## 2019-07-25 RX ORDER — POLYETHYLENE GLYCOL 3350 17 G/17G
1 POWDER, FOR SOLUTION ORAL
Status: DISCONTINUED | OUTPATIENT
Start: 2019-07-25 | End: 2019-07-26 | Stop reason: HOSPADM

## 2019-07-25 RX ORDER — LEVETIRACETAM 500 MG/1
500 TABLET ORAL 2 TIMES DAILY
Qty: 60 TAB | Refills: 1 | Status: SHIPPED | OUTPATIENT
Start: 2019-07-25 | End: 2020-06-12

## 2019-07-25 RX ORDER — ONDANSETRON 4 MG/1
4 TABLET, ORALLY DISINTEGRATING ORAL EVERY 4 HOURS PRN
Status: DISCONTINUED | OUTPATIENT
Start: 2019-07-25 | End: 2019-07-26 | Stop reason: HOSPADM

## 2019-07-25 RX ORDER — ONDANSETRON 2 MG/ML
4 INJECTION INTRAMUSCULAR; INTRAVENOUS ONCE
Status: COMPLETED | OUTPATIENT
Start: 2019-07-25 | End: 2019-07-25

## 2019-07-25 RX ADMIN — GADOBUTROL 6 ML: 604.72 INJECTION INTRAVENOUS at 13:50

## 2019-07-25 RX ADMIN — LEVETIRACETAM 500 MG: 500 TABLET, FILM COATED ORAL at 10:51

## 2019-07-25 RX ADMIN — ONDANSETRON 4 MG: 2 INJECTION INTRAMUSCULAR; INTRAVENOUS at 03:56

## 2019-07-25 RX ADMIN — LORAZEPAM 1 MG: 2 INJECTION INTRAMUSCULAR; INTRAVENOUS at 03:55

## 2019-07-25 RX ADMIN — SODIUM CHLORIDE, POTASSIUM CHLORIDE, SODIUM LACTATE AND CALCIUM CHLORIDE 1000 ML: 600; 310; 30; 20 INJECTION, SOLUTION INTRAVENOUS at 03:56

## 2019-07-25 RX ADMIN — LEVETIRACETAM 500 MG: 500 TABLET, FILM COATED ORAL at 22:22

## 2019-07-25 RX ADMIN — SODIUM CHLORIDE, POTASSIUM CHLORIDE, SODIUM LACTATE AND CALCIUM CHLORIDE: 600; 310; 30; 20 INJECTION, SOLUTION INTRAVENOUS at 10:51

## 2019-07-25 NOTE — ASSESSMENT & PLAN NOTE
New onset while in the emergency department, with concern for underlying spinal process such as abscess given injection drug use.  Will need imaging of the thoracic and lumbar spine MRI to evaluate.
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
## 2019-07-25 NOTE — ED NOTES
Med rec completed historically   Unable to obtain information from the patient    Patient was prescribed Keppra yesterday 7/24/19 after she was D/C from the ED

## 2019-07-25 NOTE — ED NOTES
I went into room to get breathalyzer and ask for a urine sample, pt back asleep, rousable but not complying to requests.

## 2019-07-25 NOTE — PROGRESS NOTES
"Spoke to patient with KORY Gutiérrez, , Rossy, and myself. Security was present.  Explaining that patient will be discharged.  Called patient's mother, Jessi , who brought patient to the ER to please come to  patient.  Patient is not participating in care and not willing to call \"[facility]\" who can help her.  When asked if she could call her mother to pick her up, the patient replied that she did not know her number and she does not have anyone.  "

## 2019-07-25 NOTE — ED NOTES
Blood cx x 2 obtained and sent.  Pt laying on gurney with blanket over her head,  Minimally interactive. Updated on POC,  Will cont to monitor.

## 2019-07-25 NOTE — DISCHARGE PLANNING
Anticipated Discharge Disposition: TBD    Action: LSW met with hospitalist RN, Ag and asked LSW to call [facility] because pt might be ready for d/c.   LSW called [facility] and representative asked for call back number and stated they would call back.     Barriers to Discharge: None    Plan: LSW await phone call from [facility].       Addendum 1527  LSW received phone call from Prabha ([phone]) with [facility]. Prabha explained that pt just needs to get ahold of them if she wants to voluntarily still continue with their services.    LSW informed Ag ([phone]). LSW will provide pt [facility] telephone number ([phone])

## 2019-07-25 NOTE — DISCHARGE PLANNING
note:  RN hospitalist asked CM to assist in discharging pt. Assigned LSW is Reanna. RN hospitalist said that [facility] will [facility] to [facility] at [address] [place] [address].  However, per yosi, their hours of operation are up to 5pm only.   Called [phone], but answering service answered and  will call CM back.     Addendum:  @5067- no call [facility] from [facility] . RN notified.

## 2019-07-25 NOTE — ED NOTES
Pt has had IV placed by ultrasound, pt demanding warm blankets, more apple juice and pain meds.   Ativan ordered and given after IV established, pt resting in bed at this time.
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
## 2019-07-25 NOTE — ED NOTES
Pt resting in bed, eyes closed, vitals stable, rousable to take blood pressure, returns to resting immediately.
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
## 2019-07-25 NOTE — ED NOTES
Pt given sip of water and warm blanket, still states she can't give urine sample.  Periods of sleep mixed with periods of restlessness observed.

## 2019-07-25 NOTE — ED NOTES
Pt in room resting with eyes closed.  Still unable to give urine sample.  Plan of care discussed with ERP and psych liaison,  Tentative plan is to allow patient to wake up and discharge home to mother.

## 2019-07-25 NOTE — PROGRESS NOTES
Attending Hospitalist today is Dr. Calderon starting at 7am.  Please call this physician for orders, updates, or questions.

## 2019-07-25 NOTE — PROGRESS NOTES
Patient refusing to cooperate with cares and answer questions, continues to place head under blankets, MD at bedside notified.

## 2019-07-26 VITALS
RESPIRATION RATE: 16 BRPM | HEIGHT: 65 IN | OXYGEN SATURATION: 88 % | HEART RATE: 51 BPM | TEMPERATURE: 98.3 F | DIASTOLIC BLOOD PRESSURE: 74 MMHG | BODY MASS INDEX: 21.85 KG/M2 | SYSTOLIC BLOOD PRESSURE: 115 MMHG | WEIGHT: 131.17 LBS

## 2019-07-26 PROBLEM — R39.81 FUNCTIONAL URINARY INCONTINENCE: Status: ACTIVE | Noted: 2019-07-26

## 2019-07-26 PROBLEM — F32.9 REACTIVE DEPRESSION: Status: ACTIVE | Noted: 2019-07-26

## 2019-07-26 PROBLEM — T74.21XA SEXUAL ABUSE OF ADULT: Status: ACTIVE | Noted: 2019-07-26

## 2019-07-26 LAB
AMPHET UR QL SCN: NEGATIVE
ANION GAP SERPL CALC-SCNC: 10 MMOL/L (ref 0–11.9)
APPEARANCE UR: CLEAR
BACTERIA #/AREA URNS HPF: NEGATIVE /HPF
BARBITURATES UR QL SCN: NEGATIVE
BASOPHILS # BLD AUTO: 0.1 % (ref 0–1.8)
BASOPHILS # BLD: 0.01 K/UL (ref 0–0.12)
BENZODIAZ UR QL SCN: NEGATIVE
BILIRUB UR QL STRIP.AUTO: NEGATIVE
BUN SERPL-MCNC: 11 MG/DL (ref 8–22)
BZE UR QL SCN: NEGATIVE
CALCIUM SERPL-MCNC: 9.1 MG/DL (ref 8.5–10.5)
CANNABINOIDS UR QL SCN: NEGATIVE
CHLORIDE SERPL-SCNC: 106 MMOL/L (ref 96–112)
CO2 SERPL-SCNC: 23 MMOL/L (ref 20–33)
COLOR UR: YELLOW
CREAT SERPL-MCNC: 0.51 MG/DL (ref 0.5–1.4)
EOSINOPHIL # BLD AUTO: 0.01 K/UL (ref 0–0.51)
EOSINOPHIL NFR BLD: 0.1 % (ref 0–6.9)
EPI CELLS #/AREA URNS HPF: NEGATIVE /HPF
ERYTHROCYTE [DISTWIDTH] IN BLOOD BY AUTOMATED COUNT: 40.1 FL (ref 35.9–50)
GLUCOSE SERPL-MCNC: 106 MG/DL (ref 65–99)
GLUCOSE UR STRIP.AUTO-MCNC: NEGATIVE MG/DL
HCG UR QL: NEGATIVE
HCT VFR BLD AUTO: 37.9 % (ref 37–47)
HGB BLD-MCNC: 13.1 G/DL (ref 12–16)
HYALINE CASTS #/AREA URNS LPF: NORMAL /LPF
IMM GRANULOCYTES # BLD AUTO: 0.03 K/UL (ref 0–0.11)
IMM GRANULOCYTES NFR BLD AUTO: 0.3 % (ref 0–0.9)
KETONES UR STRIP.AUTO-MCNC: NEGATIVE MG/DL
LEUKOCYTE ESTERASE UR QL STRIP.AUTO: ABNORMAL
LEVETIRACETAM SERPL-MCNC: 3 UG/ML (ref 12–46)
LYMPHOCYTES # BLD AUTO: 3.55 K/UL (ref 1–4.8)
LYMPHOCYTES NFR BLD: 35.8 % (ref 22–41)
MCH RBC QN AUTO: 28.9 PG (ref 27–33)
MCHC RBC AUTO-ENTMCNC: 34.6 G/DL (ref 33.6–35)
MCV RBC AUTO: 83.5 FL (ref 81.4–97.8)
METHADONE UR QL SCN: NEGATIVE
MICRO URNS: ABNORMAL
MONOCYTES # BLD AUTO: 0.55 K/UL (ref 0–0.85)
MONOCYTES NFR BLD AUTO: 5.5 % (ref 0–13.4)
NEUTROPHILS # BLD AUTO: 5.78 K/UL (ref 2–7.15)
NEUTROPHILS NFR BLD: 58.2 % (ref 44–72)
NITRITE UR QL STRIP.AUTO: NEGATIVE
NRBC # BLD AUTO: 0 K/UL
NRBC BLD-RTO: 0 /100 WBC
OPIATES UR QL SCN: POSITIVE
OXYCODONE UR QL SCN: NEGATIVE
PCP UR QL SCN: NEGATIVE
PH UR STRIP.AUTO: 7.5 [PH]
PLATELET # BLD AUTO: 363 K/UL (ref 164–446)
PMV BLD AUTO: 9 FL (ref 9–12.9)
POTASSIUM SERPL-SCNC: 3.8 MMOL/L (ref 3.6–5.5)
PROPOXYPH UR QL SCN: NEGATIVE
PROT UR QL STRIP: NEGATIVE MG/DL
RBC # BLD AUTO: 4.54 M/UL (ref 4.2–5.4)
RBC # URNS HPF: NORMAL /HPF
RBC UR QL AUTO: NEGATIVE
SODIUM SERPL-SCNC: 139 MMOL/L (ref 135–145)
SP GR UR REFRACTOMETRY: 1.02
SP GR UR STRIP.AUTO: 1.02
UROBILINOGEN UR STRIP.AUTO-MCNC: 2 MG/DL
WBC # BLD AUTO: 9.9 K/UL (ref 4.8–10.8)
WBC #/AREA URNS HPF: NORMAL /HPF

## 2019-07-26 PROCEDURE — 80307 DRUG TEST PRSMV CHEM ANLYZR: CPT

## 2019-07-26 PROCEDURE — 99225 PR SUBSEQUENT OBSERVATION CARE,LEVEL II: CPT | Performed by: HOSPITALIST

## 2019-07-26 PROCEDURE — G0378 HOSPITAL OBSERVATION PER HR: HCPCS

## 2019-07-26 PROCEDURE — A9270 NON-COVERED ITEM OR SERVICE: HCPCS | Performed by: HOSPITALIST

## 2019-07-26 PROCEDURE — 81001 URINALYSIS AUTO W/SCOPE: CPT | Mod: XU

## 2019-07-26 PROCEDURE — 700102 HCHG RX REV CODE 250 W/ 637 OVERRIDE(OP): Performed by: HOSPITALIST

## 2019-07-26 PROCEDURE — 36415 COLL VENOUS BLD VENIPUNCTURE: CPT

## 2019-07-26 PROCEDURE — 85025 COMPLETE CBC W/AUTO DIFF WBC: CPT

## 2019-07-26 PROCEDURE — 80048 BASIC METABOLIC PNL TOTAL CA: CPT

## 2019-07-26 PROCEDURE — 51798 US URINE CAPACITY MEASURE: CPT

## 2019-07-26 PROCEDURE — 81025 URINE PREGNANCY TEST: CPT

## 2019-07-26 RX ADMIN — LEVETIRACETAM 500 MG: 500 TABLET, FILM COATED ORAL at 08:09

## 2019-07-26 ASSESSMENT — ENCOUNTER SYMPTOMS
CONSTIPATION: 0
NAUSEA: 0
WEAKNESS: 0
PALPITATIONS: 0
NECK PAIN: 0
FEVER: 0
EYE DISCHARGE: 0
CLAUDICATION: 0
HEADACHES: 0
CHILLS: 0
DEPRESSION: 1
VOMITING: 0
BRUISES/BLEEDS EASILY: 0
DIZZINESS: 0
ABDOMINAL PAIN: 0
DIAPHORESIS: 0
DIARRHEA: 0
EYE PAIN: 0
BACK PAIN: 0
LOSS OF CONSCIOUSNESS: 0
HEMOPTYSIS: 0
SPUTUM PRODUCTION: 0
WHEEZING: 0
FOCAL WEAKNESS: 0
SHORTNESS OF BREATH: 0
MYALGIAS: 0
SPEECH CHANGE: 0
COUGH: 0
SENSORY CHANGE: 0
SORE THROAT: 0

## 2019-07-26 ASSESSMENT — LIFESTYLE VARIABLES: SUBSTANCE_ABUSE: 1

## 2019-07-26 NOTE — PROGRESS NOTES
Patient had episode of incontinent urine, refused to get oob with RN and CNA. Charge RN notified and assisted to help patient to the bathroom, patient cooperative with charge RN in room.

## 2019-07-26 NOTE — PROGRESS NOTES
"Pt asking for RN. This RN covering for assigned RN. Pt states, \"I think I'm going to throw up.\" This RN retrieved emesis bag. Pt sleeping soundly upon return to room with bag. Bag left on tray.  "
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
## 2019-07-26 NOTE — PROGRESS NOTES
Pt alert and oriented to self. Redirected patient several times throughout shift. Cooperative with care at this time. Shower given this shift. Incontinence care provided as needed. Pt complains of generalized all over pain, does not want PRN tylenol. Repositioning and warm blankets for comfort.  All needs met at this time.

## 2019-07-26 NOTE — PSYCHIATRY
PSYCHIATRY NOTE: Consult received. Patient will be seen within the next 24 hours. Thank you.
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
## 2019-07-27 NOTE — ASSESSMENT & PLAN NOTE
History of depression,  Sees outpatient Dr. PLEITEZ psychiatrist.  Having urinary incontinence, hopelessness, despair related to her current situation.  Psychiatry consult

## 2019-07-27 NOTE — PROGRESS NOTES
[facility] patient has chosen to leave the hospital against medical advice. The attending physician has not discharged the patient. Patient is not a risk to himself or others. I have discussed with the patient the following:  Physician has not determined patient is ready for discharge, Risks and consequences of leaving the hospital too soon and Benefit of continued hospitalization.      Discharge against medical advice form has been Signed.      Patient is alert and oriented and a referral to  was made.    Attending physician has been notified.

## 2019-07-27 NOTE — PROGRESS NOTES
"At shift change patient states she wants to leave, she wanted to smoke and use and did not wish to go through treatment. Patient educated on plan for care and treatment options. Patient earlier stated she did not feel safe out of here, she now states she doesn't not care and \"I need to use\". Patient refusing any alternative medications, nicotine patches or gum. Attempted to call mother, per patient she might have her cell phone, mother called twice, left message, no calls returned. MD paged, states will come see patient at bedside.   "

## 2019-07-27 NOTE — PROGRESS NOTES
Notified by patient's nurse she wants to leave AMA    Presented to the bedside patient is oriented and appears capacitated to make decision to leave AMA my opinion.  She denies any suicidal ideation or intent.  She reports that she wants to leave to smoke and use narcotics.  She is also concerned about her belongings they offered to have a  assist her with locating her belongings however she refused  She understands she is leaving AMA, advised her to present back to the emergency room if she changes her mind
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
## 2019-07-27 NOTE — ASSESSMENT & PLAN NOTE
Bladder scan normal 100cc.  No retention  Does not appear to be neurological in nature, rather related to patient's depression.  MRI lumbar spine negative, CTs C /t/L spine negative for trauma.

## 2019-07-30 LAB
BACTERIA BLD CULT: NORMAL
BACTERIA BLD CULT: NORMAL
SIGNIFICANT IND 70042: NORMAL
SIGNIFICANT IND 70042: NORMAL
SITE SITE: NORMAL
SITE SITE: NORMAL
SOURCE SOURCE: NORMAL
SOURCE SOURCE: NORMAL

## 2020-03-10 LAB
APPEARANCE UR: CLEAR
BACTERIA #/AREA URNS HPF: ABNORMAL /HPF
BILIRUB UR QL STRIP.AUTO: NEGATIVE
COLOR UR: YELLOW
EPI CELLS #/AREA URNS HPF: ABNORMAL /HPF
GLUCOSE UR STRIP.AUTO-MCNC: NEGATIVE MG/DL
HYALINE CASTS #/AREA URNS LPF: ABNORMAL /LPF
KETONES UR STRIP.AUTO-MCNC: NEGATIVE MG/DL
LEUKOCYTE ESTERASE UR QL STRIP.AUTO: ABNORMAL
MICRO URNS: ABNORMAL
NITRITE UR QL STRIP.AUTO: NEGATIVE
PH UR STRIP.AUTO: 6.5 [PH] (ref 5–8)
PROT UR QL STRIP: NEGATIVE MG/DL
RBC # URNS HPF: ABNORMAL /HPF
RBC UR QL AUTO: NEGATIVE
SP GR UR STRIP.AUTO: 1.03
UROBILINOGEN UR STRIP.AUTO-MCNC: 0.2 MG/DL
WBC #/AREA URNS HPF: ABNORMAL /HPF

## 2020-03-10 PROCEDURE — 99285 EMERGENCY DEPT VISIT HI MDM: CPT

## 2020-03-10 PROCEDURE — 87086 URINE CULTURE/COLONY COUNT: CPT

## 2020-03-10 PROCEDURE — 81025 URINE PREGNANCY TEST: CPT

## 2020-03-10 PROCEDURE — 81001 URINALYSIS AUTO W/SCOPE: CPT

## 2020-03-10 PROCEDURE — 87077 CULTURE AEROBIC IDENTIFY: CPT

## 2020-03-10 ASSESSMENT — FIBROSIS 4 INDEX: FIB4 SCORE: 0.38

## 2020-03-11 ENCOUNTER — HOSPITAL ENCOUNTER (EMERGENCY)
Facility: MEDICAL CENTER | Age: 25
End: 2020-03-11
Attending: EMERGENCY MEDICINE
Payer: MEDICAID

## 2020-03-11 ENCOUNTER — APPOINTMENT (OUTPATIENT)
Dept: RADIOLOGY | Facility: MEDICAL CENTER | Age: 25
End: 2020-03-11
Attending: EMERGENCY MEDICINE
Payer: MEDICAID

## 2020-03-11 VITALS
TEMPERATURE: 97.8 F | BODY MASS INDEX: 22.02 KG/M2 | HEIGHT: 66 IN | HEART RATE: 54 BPM | WEIGHT: 137 LBS | DIASTOLIC BLOOD PRESSURE: 76 MMHG | OXYGEN SATURATION: 96 % | RESPIRATION RATE: 13 BRPM | SYSTOLIC BLOOD PRESSURE: 123 MMHG

## 2020-03-11 DIAGNOSIS — R10.13 EPIGASTRIC ABDOMINAL PAIN: ICD-10-CM

## 2020-03-11 DIAGNOSIS — N30.00 ACUTE CYSTITIS WITHOUT HEMATURIA: ICD-10-CM

## 2020-03-11 LAB
ALBUMIN SERPL BCP-MCNC: 4 G/DL (ref 3.2–4.9)
ALBUMIN/GLOB SERPL: 1.1 G/DL
ALP SERPL-CCNC: 98 U/L (ref 30–99)
ALT SERPL-CCNC: 14 U/L (ref 2–50)
ANION GAP SERPL CALC-SCNC: 8 MMOL/L (ref 0–11.9)
AST SERPL-CCNC: 15 U/L (ref 12–45)
BASOPHILS # BLD AUTO: 0.3 % (ref 0–1.8)
BASOPHILS # BLD: 0.02 K/UL (ref 0–0.12)
BILIRUB SERPL-MCNC: 0.3 MG/DL (ref 0.1–1.5)
BUN SERPL-MCNC: 25 MG/DL (ref 8–22)
CALCIUM SERPL-MCNC: 9.5 MG/DL (ref 8.5–10.5)
CHLORIDE SERPL-SCNC: 110 MMOL/L (ref 96–112)
CO2 SERPL-SCNC: 24 MMOL/L (ref 20–33)
CREAT SERPL-MCNC: 0.65 MG/DL (ref 0.5–1.4)
EOSINOPHIL # BLD AUTO: 0.16 K/UL (ref 0–0.51)
EOSINOPHIL NFR BLD: 2 % (ref 0–6.9)
ERYTHROCYTE [DISTWIDTH] IN BLOOD BY AUTOMATED COUNT: 49.2 FL (ref 35.9–50)
GLOBULIN SER CALC-MCNC: 3.8 G/DL (ref 1.9–3.5)
GLUCOSE SERPL-MCNC: 80 MG/DL (ref 65–99)
HAV IGM SERPL QL IA: NEGATIVE
HBV CORE IGM SER QL: NEGATIVE
HBV SURFACE AG SER QL: NEGATIVE
HCG UR QL: NEGATIVE
HCT VFR BLD AUTO: 38.5 % (ref 37–47)
HCV AB SER QL: REACTIVE
HGB BLD-MCNC: 12.5 G/DL (ref 12–16)
IMM GRANULOCYTES # BLD AUTO: 0.02 K/UL (ref 0–0.11)
IMM GRANULOCYTES NFR BLD AUTO: 0.3 % (ref 0–0.9)
LIPASE SERPL-CCNC: 34 U/L (ref 11–82)
LYMPHOCYTES # BLD AUTO: 4.27 K/UL (ref 1–4.8)
LYMPHOCYTES NFR BLD: 54.4 % (ref 22–41)
MCH RBC QN AUTO: 29.3 PG (ref 27–33)
MCHC RBC AUTO-ENTMCNC: 32.5 G/DL (ref 33.6–35)
MCV RBC AUTO: 90.2 FL (ref 81.4–97.8)
MONOCYTES # BLD AUTO: 0.43 K/UL (ref 0–0.85)
MONOCYTES NFR BLD AUTO: 5.5 % (ref 0–13.4)
NEUTROPHILS # BLD AUTO: 2.95 K/UL (ref 2–7.15)
NEUTROPHILS NFR BLD: 37.5 % (ref 44–72)
NRBC # BLD AUTO: 0 K/UL
NRBC BLD-RTO: 0 /100 WBC
PLATELET # BLD AUTO: 326 K/UL (ref 164–446)
PMV BLD AUTO: 9 FL (ref 9–12.9)
POTASSIUM SERPL-SCNC: 3.9 MMOL/L (ref 3.6–5.5)
PROT SERPL-MCNC: 7.8 G/DL (ref 6–8.2)
RBC # BLD AUTO: 4.27 M/UL (ref 4.2–5.4)
SODIUM SERPL-SCNC: 142 MMOL/L (ref 135–145)
WBC # BLD AUTO: 7.9 K/UL (ref 4.8–10.8)

## 2020-03-11 PROCEDURE — 85025 COMPLETE CBC W/AUTO DIFF WBC: CPT

## 2020-03-11 PROCEDURE — 74177 CT ABD & PELVIS W/CONTRAST: CPT

## 2020-03-11 PROCEDURE — 83690 ASSAY OF LIPASE: CPT

## 2020-03-11 PROCEDURE — 99285 EMERGENCY DEPT VISIT HI MDM: CPT

## 2020-03-11 PROCEDURE — 80074 ACUTE HEPATITIS PANEL: CPT

## 2020-03-11 PROCEDURE — 700117 HCHG RX CONTRAST REV CODE 255: Performed by: EMERGENCY MEDICINE

## 2020-03-11 PROCEDURE — 96375 TX/PRO/DX INJ NEW DRUG ADDON: CPT

## 2020-03-11 PROCEDURE — 700111 HCHG RX REV CODE 636 W/ 250 OVERRIDE (IP): Performed by: EMERGENCY MEDICINE

## 2020-03-11 PROCEDURE — 96374 THER/PROPH/DIAG INJ IV PUSH: CPT

## 2020-03-11 PROCEDURE — 87522 HEPATITIS C REVRS TRNSCRPJ: CPT

## 2020-03-11 PROCEDURE — 80053 COMPREHEN METABOLIC PANEL: CPT

## 2020-03-11 RX ORDER — ONDANSETRON 2 MG/ML
4 INJECTION INTRAMUSCULAR; INTRAVENOUS ONCE
Status: COMPLETED | OUTPATIENT
Start: 2020-03-11 | End: 2020-03-11

## 2020-03-11 RX ORDER — ONDANSETRON 4 MG/1
4 TABLET, ORALLY DISINTEGRATING ORAL EVERY 6 HOURS PRN
Qty: 10 TAB | Refills: 0 | Status: SHIPPED | OUTPATIENT
Start: 2020-03-11 | End: 2020-06-12

## 2020-03-11 RX ORDER — KETOROLAC TROMETHAMINE 30 MG/ML
15 INJECTION, SOLUTION INTRAMUSCULAR; INTRAVENOUS ONCE
Status: COMPLETED | OUTPATIENT
Start: 2020-03-11 | End: 2020-03-11

## 2020-03-11 RX ORDER — NITROFURANTOIN 25; 75 MG/1; MG/1
100 CAPSULE ORAL 2 TIMES DAILY
Qty: 10 CAP | Refills: 0 | Status: SHIPPED | OUTPATIENT
Start: 2020-03-11 | End: 2020-03-16

## 2020-03-11 RX ADMIN — IOHEXOL 85 ML: 350 INJECTION, SOLUTION INTRAVENOUS at 03:14

## 2020-03-11 RX ADMIN — ONDANSETRON 4 MG: 2 INJECTION INTRAMUSCULAR; INTRAVENOUS at 02:23

## 2020-03-11 RX ADMIN — KETOROLAC TROMETHAMINE 15 MG: 30 INJECTION, SOLUTION INTRAMUSCULAR at 02:51
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
## 2020-03-11 NOTE — ED NOTES
Break RN: Medicated per MAR, updated patient on POC. No other needs at this time, will continue to monitor.

## 2020-03-11 NOTE — DISCHARGE INSTRUCTIONS
You were seen in the Emergency Department for abdominal pain.    Labs, CT scan were completed without significant acute abnormalities.  Urine test showed signs of infection.  Hepatitis panel is still pending at this time.    Please use 1,000mg of tylenol or 600mg of ibuprofen every 6 hours as needed for pain.    Please follow up with your primary care physician.    Return to the Emergency Department with worsening pain, fevers, persistent vomiting, or other concerns.

## 2020-03-11 NOTE — ED NOTES
Pt reassessed for concern of worsening abd distension. Pt has diffuse pain throughout ABD on palpation but does not seem to be worsening from initial assessment.

## 2020-03-11 NOTE — ED TRIAGE NOTES
"Chief Complaint   Patient presents with   • Flank Pain     bilateral   • Epigastric Pain     Pt reports as above for one week but worsening today. Confirms dysuria that started today. Pt additionally reports she has not had a period in 4 years but got one three days ago.HX of heavy heroin use but not clean for 33 days. Pt educated on triage process and placed back in lobby, pt encourage to alert staff with changes in condition.      /99   Pulse 74   Temp 36.6 °C (97.8 °F) (Oral)   Resp 18   Ht 1.676 m (5' 6\")   Wt 62.1 kg (137 lb)   LMP 03/07/2020   SpO2 99%   BMI 22.11 kg/m²     "

## 2020-03-12 LAB
HCV RNA SERPL NAA+PROBE-ACNC: NOT DETECTED IU/ML
HCV RNA SERPL NAA+PROBE-LOG IU: NOT DETECTED LOG IU/ML
HCV RNA SERPL QL NAA+PROBE: NOT DETECTED

## 2020-03-13 LAB
BACTERIA UR CULT: ABNORMAL
BACTERIA UR CULT: ABNORMAL
SIGNIFICANT IND 70042: ABNORMAL
SITE SITE: ABNORMAL
SOURCE SOURCE: ABNORMAL

## 2020-03-14 NOTE — ED NOTES
ED Positive Culture Follow-up/Notification Note:    Date: 3/13/2020     Patient seen in the ED on 3/10/2020 for acute, moderate abdominal pain x 1.5 weeks.   1. Epigastric abdominal pain    2. Acute cystitis without hematuria       Discharge Medication List as of 3/11/2020  4:28 AM      START taking these medications    Details   nitrofurantoin (MACROBID) 100 MG Cap Take 1 Cap by mouth 2 times a day for 5 days., Disp-10 Cap, R-0, Print Rx Paper      ondansetron (ZOFRAN ODT) 4 MG TABLET DISPERSIBLE Take 1 Tab by mouth every 6 hours as needed., Disp-10 Tab, R-0, Print Rx Paper             Allergies: Other drug; Latex; and Morphine     Vitals:    03/11/20 0315 03/11/20 0330 03/11/20 0345 03/11/20 0431   BP:  (!) 99/62  123/76   Pulse: 72 63 62 (!) 54   Resp: 20 15 (!) 22 13   Temp:       TempSrc:       SpO2: 98% 97% 97% 96%   Weight:       Height:           Final cultures:   Results     Procedure Component Value Units Date/Time    URINE CULTURE(NEW) [[id number]]  (Abnormal) Collected:  03/10/20 2230    Order Status:  Completed Specimen:  Urine Updated:  03/13/20 0758     Significant Indicator POS     Source UR     Site -     Culture Result Mixed skin tucker 10-50,000 cfu/mL      Streptococcus agalactiae (Group B)  <10,000 cfu/mL      URINALYSIS,CULTURE IF INDICATED [[id number]]  (Abnormal) Collected:  03/10/20 2230    Order Status:  Completed Specimen:  Urine, Clean Catch Updated:  03/10/20 2249     Color Yellow     Character Clear     Specific Gravity 1.026     Ph 6.5     Glucose Negative mg/dL      Ketones Negative mg/dL      Protein Negative mg/dL      Bilirubin Negative     Urobilinogen, Urine 0.2     Nitrite Negative     Leukocyte Esterase Small     Occult Blood Negative     Micro Urine Req Microscopic          Plan:   Group B Strep in the urine is a common urogenital colonizer and not likely a true urinary pathogen.  Nitrofurantoin will provide good empiric treatment against urinary pathogens.    Lexy Ruiz,  PharmD

## 2021-03-04 ENCOUNTER — HOSPITAL ENCOUNTER (EMERGENCY)
Facility: MEDICAL CENTER | Age: 26
End: 2021-03-04
Attending: OBSTETRICS & GYNECOLOGY | Admitting: OBSTETRICS & GYNECOLOGY
Payer: MEDICAID

## 2021-03-04 VITALS
BODY MASS INDEX: 36.16 KG/M2 | OXYGEN SATURATION: 97 % | TEMPERATURE: 97.2 F | HEIGHT: 66 IN | DIASTOLIC BLOOD PRESSURE: 79 MMHG | SYSTOLIC BLOOD PRESSURE: 129 MMHG | HEART RATE: 78 BPM | WEIGHT: 225 LBS

## 2021-03-04 PROCEDURE — 302449 STATCHG TRIAGE ONLY (STATISTIC)

## 2021-03-04 PROCEDURE — 99283 EMERGENCY DEPT VISIT LOW MDM: CPT

## 2021-03-04 PROCEDURE — 59025 FETAL NON-STRESS TEST: CPT

## 2021-03-04 ASSESSMENT — FIBROSIS 4 INDEX: FIB4 SCORE: 0.34

## 2021-03-04 ASSESSMENT — PAIN DESCRIPTION - PAIN TYPE: TYPE: ACUTE PAIN

## 2021-03-04 ASSESSMENT — PAIN SCALES - GENERAL: PAINLEVEL: 0 - NO PAIN

## 2021-03-05 NOTE — PROGRESS NOTES
": Pt presents to L& D with complaints of contractions starting at 1600. Pt is a [facility] of [facility] and is seen in [place]. Pt states she just recently moved to [place] from [place]. Pt is  with EDC of 3/8 making her 39&3. Pt reports her pregnancy is complicated with anemia for which she supplements iron. Pt denies other complications with pregnancy. Pt has medical history of Hep C + but reports no symptoms for many years. Pt reports +FM, denies bleeding or LOF.     : EFM/[facility] applied.     : Pt states she was 1cm dilated in the clinic on Monday. Pt states her mother told her \"once you've dilated and start tristen, you are in labor.\" Pt states contractions were irregular and that feeling has stopped since she arrived at the hospital. Pt states she was not previouslt timing or counting contractions so is unsure on their previous frequency. Pt educated on signs of early labor compared to active labor.     : SVE /4. Unchanged from previous exam stated by pt. Pt states she plans to deliver in [place] but came to [facility] for care d/t urgency of concerns of active labor.     : Call made to Robyn with pt status. Order to discharge pt home with labor precautions. Tracing reviewed, will leave pt on monitor longer d/t broken tracing. Once NST is obtained, pt okay to d/c.     : Discharge instructions discussed with pt and SO. All questions answered. Pt denies further concerns at this time. Pt states she has an appointment scheduled with her OB on Monday.     : Pt off unit with SO. Belongings accounted for.   "

## 2021-03-24 ENCOUNTER — HOSPITAL ENCOUNTER (EMERGENCY)
Facility: MEDICAL CENTER | Age: 26
End: 2021-03-24
Payer: MEDICAID

## 2021-03-24 VITALS
DIASTOLIC BLOOD PRESSURE: 115 MMHG | RESPIRATION RATE: 16 BRPM | OXYGEN SATURATION: 97 % | WEIGHT: 203.93 LBS | TEMPERATURE: 100.3 F | HEIGHT: 66 IN | HEART RATE: 87 BPM | SYSTOLIC BLOOD PRESSURE: 169 MMHG | BODY MASS INDEX: 32.77 KG/M2

## 2021-03-24 PROCEDURE — 302449 STATCHG TRIAGE ONLY (STATISTIC)

## 2021-03-24 ASSESSMENT — FIBROSIS 4 INDEX: FIB4 SCORE: 0.34

## 2021-03-25 NOTE — ED TRIAGE NOTES
Zeina Cantu  25 y.o.  Chief Complaint   Patient presents with   • Post-Op Complications     pt had emergent  on 3/10 r/t fetal bradycardia; pt now c/o severe pain to the R side of her incision along w fever and chills; the area is red and swollen with the redness spreading out onto the skin of the RLQ abdomen; edges of the incision are well approximated, pt denies prurulent drainage; pt reports she is breastfeeding at this time   • Painful Urination     x 2 days   • Diarrhea   • Headache

## 2021-04-08 ENCOUNTER — OFFICE VISIT (OUTPATIENT)
Dept: NEUROLOGY | Facility: MEDICAL CENTER | Age: 26
End: 2021-04-08
Attending: STUDENT IN AN ORGANIZED HEALTH CARE EDUCATION/TRAINING PROGRAM
Payer: MEDICAID

## 2021-04-08 VITALS
DIASTOLIC BLOOD PRESSURE: 70 MMHG | HEIGHT: 66 IN | TEMPERATURE: 97.2 F | OXYGEN SATURATION: 97 % | SYSTOLIC BLOOD PRESSURE: 92 MMHG | WEIGHT: 190 LBS | RESPIRATION RATE: 16 BRPM | HEART RATE: 92 BPM | BODY MASS INDEX: 30.53 KG/M2

## 2021-04-08 DIAGNOSIS — G40.909 SEIZURE DISORDER (HCC): Primary | ICD-10-CM

## 2021-04-08 DIAGNOSIS — R40.4 TRANSIENT ALTERATION OF AWARENESS: ICD-10-CM

## 2021-04-08 DIAGNOSIS — F51.01 PRIMARY INSOMNIA: ICD-10-CM

## 2021-04-08 DIAGNOSIS — F31.9 BIPOLAR AFFECTIVE DISORDER, REMISSION STATUS UNSPECIFIED (HCC): ICD-10-CM

## 2021-04-08 PROCEDURE — 99211 OFF/OP EST MAY X REQ PHY/QHP: CPT | Performed by: STUDENT IN AN ORGANIZED HEALTH CARE EDUCATION/TRAINING PROGRAM

## 2021-04-08 PROCEDURE — 99204 OFFICE O/P NEW MOD 45 MIN: CPT | Performed by: STUDENT IN AN ORGANIZED HEALTH CARE EDUCATION/TRAINING PROGRAM

## 2021-04-08 RX ORDER — ESCITALOPRAM OXALATE 10 MG/1
15 TABLET ORAL DAILY
COMMUNITY
End: 2023-09-28

## 2021-04-08 ASSESSMENT — ENCOUNTER SYMPTOMS
GASTROINTESTINAL NEGATIVE: 1
DOUBLE VISION: 1
RESPIRATORY NEGATIVE: 1
PSYCHIATRIC NEGATIVE: 1
MUSCULOSKELETAL NEGATIVE: 1
CONSTITUTIONAL NEGATIVE: 1
CARDIOVASCULAR NEGATIVE: 1
NEUROLOGICAL NEGATIVE: 1
BLURRED VISION: 1

## 2021-04-08 ASSESSMENT — FIBROSIS 4 INDEX: FIB4 SCORE: 0.34

## 2021-04-08 NOTE — PROGRESS NOTES
"GENERAL NEUROLOGY INITIAL ENCOUNTER - 04/08/2021   REFERRING PROVIDER:  Darien Concepcion M.D.  [address] [place] [address]    REASON FOR VISIT: seizure    HISTORY OF PRESENT ILLNESS:  Zeina Cynthia Cantu 25 y.o. female presents today to establish care.    Seizures: weird tase, clammy, hands and feet become clonic, lose awareness, has GTCs. As been hospitalized. Post-ictal confusion incoordination and weakness, 15-30 minutes post ictal. Random times per day. Triggers: not eating, occurs in high anxiety situations, poor sleep. Was occurring once per month up untl she got pregnant 10 months ago. Hasn't had anything in 10 month      Boyfriend thinks he may be \"having absence\" seizure but she thinks she just spaces out.    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     Started when 16. Occurring once per month.    Patient's PMH, PSH, FH, and SH were reviewed.  Medications and allergies were reviewed.  Past medical history:   Past Medical History:   Diagnosis Date   • Anxiety    • Bacterial vaginosis    • Depression    • Epilepsy (HCC)    • Fx clavicle right   • Hepatitis C    • Migraine    • PID (pelvic inflammatory disease)    • Psychiatric disorder     bipoal - adhd   • Substance abuse (HCC)      Past surgical history:   Past Surgical History:   Procedure Laterality Date   • GYN SURGERY      ovarian cysts   • OTHER      toncils, adenoids, appendix   • OTHER ABDOMINAL SURGERY      telescoping intestines   • TONSILLECTOMY       Family history:   Family History   Problem Relation Age of Onset   • Bipolar disorder Mother    • Depression Mother    • Genitourinary () Problems Mother    • Heart Attack Mother    • Recurrent UTI's Mother    • Sexual abuse Mother    • Stroke Mother    • Alcohol/Drug Father    • Anxiety disorder Father    • Bipolar disorder Father    • Depression " Father    • Paranoid behavior Father    • Psychiatric Illness Father      Social history:   Social History     Socioeconomic History   • Marital status: Single     Spouse name: Not on file   • Number of children: Not on file   • Years of education: Not on file   • Highest education level: Not on file   Occupational History   • Not on file   Tobacco Use   • Smoking status: Former Smoker     Packs/day: 0.50     Years: 8.00     Pack years: 4.00     Types: Cigarettes   • Smokeless tobacco: Never Used   Substance and Sexual Activity   • Alcohol use: Not Currently     Comment: 2x weekly   • Drug use: Not Currently     Types: Inhaled, Intravenous     Comment: [email or web address]   • Sexual activity: Not on file   Other Topics Concern   • Not on file   Social History Narrative   • Not on file     Social Determinants of Health     Financial Resource Strain:    • Difficulty of Paying Living Expenses:    Food Insecurity:    • Worried About Running Out of Food in the Last Year:    • Ran Out of Food in the Last Year:    Transportation Needs:    • Lack of Transportation (Medical):    • Lack of Transportation (Non-Medical):    Physical Activity:    • Days of Exercise per Week:    • Minutes of Exercise per Session:    Stress:    • Feeling of Stress :    Social Connections:    • Frequency of Communication with Friends and Family:    • Frequency of Social Gatherings with Friends and Family:    • Attends [employer] Services:    • Active Member of Clubs or Organizations:    • Attends Club or Organization Meetings:    • Marital Status:    Intimate Partner Violence:    • Fear of Current or Ex-Partner:    • Emotionally Abused:    • Physically Abused:    • Sexually Abused:      Current medications:     Current Outpatient Medications:   •  escitalopram, Take 5 mg by mouth., Taking  •  Prenatal MV-Min-Fe Fum-FA-DHA (PRENATAL 1 PO), Take  by mouth., Taking  •  quetiapine, 400 mg, Oral, QHS, Taking   Medication Allergy:  Allergies   Allergen Reactions    • Other Drug      PT STATES SHE DOESN'T TOLERATE THE SEIZURE MEDS AND HAS TOO MANY SIDE EFFECTS   • Latex    • Morphine Unspecified     Pt reports cardiac arrest     Review of systems:   Review of Systems   Constitutional: Negative.    HENT: Negative.    Eyes: Positive for blurred vision and double vision.        Only when has headaches   Respiratory: Negative.    Cardiovascular: Negative.    Gastrointestinal: Negative.    Genitourinary: Negative.    Musculoskeletal: Negative.    Neurological: Negative.    Endo/Heme/Allergies: Negative.    Psychiatric/Behavioral: Negative.         PHYSICAL EXAM:   Wt Readings from Last 10 Encounters:   04/08/21 86.2 kg (190 lb)   03/04/21 102 kg (225 lb)   11/03/20 84.3 kg (185 lb 13.6 oz)   09/14/20 76.3 kg (168 lb 3.2 oz)   06/12/20 73.9 kg (163 lb)   03/10/20 62.1 kg (137 lb)   07/25/19 59.5 kg (131 lb 2.8 oz)   07/24/19 61.2 kg (135 lb)   02/06/19 55.8 kg (123 lb 0.3 oz)   06/07/18 51.4 kg (113 lb 5.1 oz)      BMI Readings from Last 10 Encounters:   04/08/21 30.67 kg/m²   03/04/21 36.32 kg/m²   11/03/20 30.93 kg/m²   09/14/20 27.99 kg/m²   06/12/20 26.31 kg/m²   03/10/20 22.11 kg/m²   07/25/19 21.83 kg/m²   07/24/19 22.47 kg/m²   02/06/19 19.86 kg/m²   06/07/18 18.30 kg/m²      BP Readings from Last 5 Encounters:   04/08/21 (!) 92/70   03/04/21 129/79   11/03/20 121/67   09/14/20 122/72   06/12/20 109/89       SpO2 Readings from Last 5 Encounters:   04/08/21 97%   03/04/21 97%   11/03/20 98%   09/14/20 94%   06/12/20 95%      Pulse Readings from Last 5 Encounters:   04/08/21 92   03/04/21 78   11/03/20 81   09/14/20 (!) 101   06/12/20 87      General: Patient in no acute distress, pleasant and cooperative.  HEENT: Normocephalic, no signs of acute trauma.   Neck: appears supple, there is normal range of motion. No tenderness on exam.   Chest: clear. Symmetrical chest rise with inhalation. No cough.   CV: RRR, no murmurs.   Skin: no signs of acute rashes or trauma.    Musculoskeletal: joints exhibit full range of motion. There are no signs of joint or muscle swelling. No joint deformity noted  Psychiatric: pertinent positives as discussed above  NEUROLOGICAL EXAM:   Neurological Exam  Mental Status  Awake, alert and oriented to person, place and time. Speech is normal. Language is fluent with no aphasia. Attention and concentration are normal.    Cranial Nerves  CN II: Right funduscopic exam: not visualized. Left funduscopic exam: not visualized.  CN III, IV, VI: Extraocular movements intact bilaterally. Normal lids and orbits bilaterally.   Right pupil: 4 mm. Round. Reactive to accommodation.   Left pupil: 4 mm. Round. Reactive to light. Reactive to accommodation.  CN V: Facial sensation is normal.  CN VII: Full and symmetric facial movement.  CN XI: Shoulder shrug strength is normal.    Motor   Strength is 5/5 throughout all four extremities.    Sensory  Sensation is intact to light touch, pinprick, vibration and proprioception in all four extremities.    Reflexes                                           Right                      Left  Brachioradialis                    1+                         1+  Biceps                                 1+                         1+  Hamstring                            1+                         1+  Patellar                                1+                         1+  Achilles                                1+                         1+    Coordination  Finger-to-nose, rapid alternating movements and heel-to-shin normal bilaterally without dysmetria.    Gait  Normal casual, toe, heel and tandem gait.       ALL ANCILLARY DATA (LISTED BELOW) REVIEWED:   LABS:  Reviewed  No results found for: ZXFWCN7ZB, GPUCK4QAH, CYENLL5LJ, IQB4WRU, XPF8MEJ, JPZ4OIW, SARCOVABIG, SARSABIGM, SARSABIGA, SARCOVAB, COVRDRPG, COVNGENE, RNABYNAA, ANTIGNFIA, ABQUAL, SARCOVAB, BRJLUG1DG   No results found for: CHOLSTRLTOT, TRIGLYCERIDE, HDL, LDL, CHOLHDLRAT,  NONHDL   Admission on 11/03/2020, Discharged on 11/03/2020   Component Date Value Ref Range Status   • WBC 11/03/2020 10.5  4.8 - 10.8 K/uL Final   • RBC 11/03/2020 4.01* 4.20 - 5.40 M/uL Final   • Hemoglobin 11/03/2020 12.3* 13.0 - 17.0 g/dL Final   • Hematocrit 11/03/2020 36.5* 39.0 - 50.0 % Final   • MCV 11/03/2020 91.0  81.0 - 99.0 fL Final   • MCH 11/03/2020 30.7  27.0 - 31.0 pg Final   • MCHC 11/03/2020 33.7  33.0 - 37.0 g/dL Final   • RDW 11/03/2020 12.9  11.5 - 14.5 % Final   • Platelet Count 11/03/2020 276  130 - 400 K/uL Final   • MPV 11/03/2020 9.3  7.4 - 10.4 fL Final   • Neutrophils Automated 11/03/2020 71.3* 39.0 - 70.0 % Final   • Lymphocytes Automated 11/03/2020 21.6  21.0 - 50.0 % Final   • Monocytes Automated 11/03/2020 5.5  2.0 - 9.0 % Final   • Eosinophils Automated 11/03/2020 0.9  0.0 - 5.0 % Final   • Basophils Automated 11/03/2020 0.2  0.0 - 3.0 % Final   • Abs Neutrophils Automated 11/03/2020 7.5  1.8 - 7.7 K/uL Final   • Abs Lymph Automated 11/03/2020 2.3  1.2 - 4.8 K/uL Final   • Eosinophil Count, Blood 11/03/2020 0.09  0.00 - 0.50 K/uL Final   • Sodium 11/03/2020 138  136 - 145 mmol/L Final   • Potassium 11/03/2020 4.0  3.5 - 5.1 mmol/L Final   • Chloride 11/03/2020 104  98 - 107 mmol/L Final   • Co2 11/03/2020 20* 21 - 32 mmol/L Final   • Anion Gap 11/03/2020 18  10 - 18 mmol/L Final   • Glucose 11/03/2020 98  74 - 99 mg/dL Final   • Bun 11/03/2020 12  7 - 18 mg/dL Final   • Creatinine 11/03/2020 0.6  0.6 - 1.0 mg/dL Final   • Calcium 11/03/2020 8.9  8.5 - 11.0 mg/dL Final   • AST(SGOT) 11/03/2020 15  15 - 37 U/L Final   • ALT(SGPT) 11/03/2020 16  12 - 78 U/L Final   • Alkaline Phosphatase 11/03/2020 74  46 - 116 U/L Final   • Total Bilirubin 11/03/2020 0.3  0.2 - 1.0 mg/dL Final   • Albumin 11/03/2020 2.9* 3.4 - 5.0 g/dL Final   • Total Protein 11/03/2020 7.6  6.4 - 8.2 g/dL Final   • A-G Ratio 11/03/2020 0.6   Final   • Lipase 11/03/2020 80  73 - 393 U/L Final   • Troponin I 11/03/2020  <0.02  0.00 - 0.06 ng/mL Final    Comment: **Note Reference Range change effective 3/31/2017.**  TROPONIN I  INTERPRETATION  <=0.06    Is not suggestive of myocardial damage.  >0.06    Is suggestive of myocardial damage in the past 9 days.  Serial Troponin I testing and clinical correlation suggested.     • GFR If  11/03/2020 >60  >60 mL/min/1.73 m 2 Final   • GFR If Non  11/03/2020 >60  >60 mL/min/1.73 m 2 Final      Records, including outside reports and diagnostic testing, reviewed:   Reviewed  PROCEDURES  No results found for this or any previous visit.   Last Nuclear Medicine Stress Test  No results found for this or any previous visit.       IMAGING  Reviewed if available  No image results found.     CTs                          Results for orders placed during the hospital encounter of 05/09/17   CT-SOFT TISSUE NECK WITH    Impression 1.  There is extensive submandibular adenopathy present. In a patient of this age is most likely etiology is infection. However follow-up to assure resolution is warranted.  2.  No abscess identified.    Bhupendra Gtz MD  5/9/2017 1:57 PM    DLP Reporting Thresholds for Incorrect/Repeated Exams - DLP in mGy*cm  Head/Neck:  0-year-old 3840, 1-year-old 5880, 5-year-old 8770, 10-year-old 01823 and adult 75434  Head:  0-year-old 4540, 1-year-old 7460, 5-year-old 06831, 10-year-old 11658 and adult 09146  Neck:  0-year-old 2940, 1-year-old 4160, 5-year-old 4550, 10-year-old 6320 and adult 8470  Chest:  0-year-old 550, 1-year-old 830, 5-year-old 1200, 10-year-old 3840 and adult 3570  Abd/pelvis:  0-year-old 440, 1-year-old 720, 5-year-old 1080, 10-year-old 3330 and adult 3330  Trunk(C/A/P):  0-year-old 490, 1-year-old 770, 5-year-old 1140, 10-year-old 3570 and adult 3330            Results for orders placed during the hospital encounter of 11/01/16   CT-CHEST (THORAX) WITH    Addendum Addendum:  Partially included in the study is a mildly displaced  fracture of the  middle third of the right clavicle.  I do not appreciate a substantial hematoma, or injury to the subclavian  vasculature.  The glenohumeral joint appears intact. The scapula appears intact.    Please additionally note the presence of a mildly displaced fracture of  the middle third of the right clavicle.      Jeramie Inman M.D. 11/2/2016  8:18 AM          Impression There are subtle subpleural groundglass densities involving the anterior margins of the right upper and right middle lobes, probably relating to lung contusion, given the scenario of trauma.    The overlying ribs, however, appear intact.    Otherwise unremarkable CT scan of the chest.    Jeramie Inman MD  11/2/2016 8:05 AM    DLP Reporting Thresholds for Incorrect/Repeated Exams - DLP in mGy*cm  Head/Neck:  0-year-old 3840, 1-year-old 5880, 5-year-old 8770, 10-year-old 41857 and adult 64069  Head:  0-year-old 4540, 1-year-old 7460, 5-year-old 96084, 10-year-old 02129 and adult 39357  Neck:  0-year-old 2940, 1-year-old 4160, 5-year-old 4550, 10-year-old 6320 and adult 8470  Chest:  0-year-old 550, 1-year-old 830, 5-year-old 1200, 10-year-old 3840 and adult 3570  Abd/pelvis:  0-year-old 440, 1-year-old 720, 5-year-old 1080, 10-year-old 3330 and adult 3330  Trunk(C/A/P):  0-year-old 490, 1-year-old 770, 5-year-old 1140, 10-year-old 3570 and adult 3330                          Results for orders placed during the hospital encounter of 03/11/20   CT-ABDOMEN-PELVIS WITH    Impression 1.  No acute abnormality.  2.  Hepatomegaly                        MRIs                                             Results for orders placed during the hospital encounter of 07/24/19   MR-LUMBAR SPINE-WITH & W/O    Impression 1. MRI OF THE LUMBAR SPINE WITHOUT AND WITH CONTRAST WITHIN NORMAL LIMITS.                                                                                                           XRs      Results for orders placed during the hospital  encounter of 11/01/16   DX-ANKLE 3+ VIEWS RIGHT    Impression Negative examination.            Results for orders placed during the hospital encounter of 02/07/19   DX-CHEST-2 VIEWS    Impression 1.  No acute cardiopulmonary disease.      Results for orders placed in visit on 11/28/16   DX-CLAVICLE RIGHT    Impression This demonstrates no significant shortening of the clavicle.  It  still remains with a step-off.  There is no obvious healing  present yet.        Results for orders placed during the hospital encounter of 11/06/16   DX-ELBOW-COMPLETE 3+ RIGHT    Impression No fracture.    Extensive soft tissue edema and soft tissue swelling.  11/7/2016 8:09 AM                                      Results for orders placed during the hospital encounter of 05/12/13   DX-KNEE COMPLETE 4+    Addendum   HISTORY/REASON FOR EXAM:  Pain/Deformity Following Trauma.   TECHNIQUE/EXAM DESCRIPTION AND NUMBER OF VIEWS:  right knee, 4 views,  5/12/2013 11:30 PM.  COMPARISON: None.  FINDINGS: There is no evidence of fracture, dislocation or joint effusion. The joint  spaces are normal.   Normal right knee.   Interpreted at [facility]      Jeffrey V Behar, M.D. 5/23/2013  7:44 AM          Impression Normal right knee.      Interpreted at [facility]          Results for orders placed during the hospital encounter of 11/01/16   DX-PELVIS-1 OR 2 VIEWS    Impression No fracture seen.    Devin Escalante MD  11/2/2016 8:27 AM                      Results for orders placed during the hospital encounter of 11/03/20   DX-SHOULDER 2+ LEFT    Impression No fracture.    Kobe Wakefield MD  11/3/2020 1:09 PM                   No image results found.     EEG:  Reviewed if available  Misc:  PF Readings from Last 10 Encounters:   No data found for PF      Assessment & Plan  , Education/Counseling:  This is a 25 y.o. [unfilled] who presents to the neurology clinic with the following:  Visit Diagnoses     ICD-10-CM   1. Seizure disorder (HCC)  G40.909    2. Transient alteration of awareness  R40.4   3. Bipolar affective disorder, remission status unspecified (HCC)  F31.9   4. Primary insomnia  F51.01        We had an extensive discussion about the patient's symptoms, signs, and work-up to date, if any. We discussed potential and/or definitive diagnoses, work-up, and potential treatments. If applicable, work-up (labs, imaging, other testing), referrals, and recommended treatment strategies are listed below.    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 Seizure free for 10 months while off AEDs. No indication to restart meds at this time. Needs work-up with EEG ambulatory and MRI brain. Labs as below too. May consider EMU admission.    EDUCATION AND COUNSELING:  -Education was provided to the patient and/or family regarding diagnosis and prognosis. The chronic and unpredictable nature of the condition were discussed. There is increased risk for additional events, which may carry potential for significant injuries and death. Discussed frequent seizure triggers: sleep deprivation, medication non-compliance, use of illegal drugs/alcohol, stress, and others.   -We reviewed in detail the current antiepileptic regimen. Potential side effects of antiepileptics were discussed at length, including but no limited to: hypersensitivity reactions (rash and others, some of which can be fatal), visual field changes (some of which may be irreversible), glaucoma, diplopia, kidney stones, osteopenia/osteoporosis/bone fractures, hyperthermia/anhydrosis, hyponatremia, tremors/abnormal movements, ataxia, dizziness, fatigue, increased risk for falls, risk for cardiac arrhythmias/syncope, gastrointestinal side effects(hepatitis, pancreatitis, gastritis, ulcers), gingival hypertrophy/bleeding, drowsiness, sedation, anxiety/nervousness, increased risk for suicide, increased risk for depression, and psychosis.   -We also reviewed drug-drug  interactions and their potential effect on seizure control and medication side effects.    -We also discussed in detail potential effects of seizures, epilepsy, and medications during pregnancy, including but not limited to fetal malformations, child developmental/intellectual disability, fetal/ risk for hemorrhages, stillbirth, maternal death, premature birth, and others. The patient/family aware that pregnancy should be avoided, unless desired, in which case we recommend discussing with us at least a year prior to planned conception. To avoid undesired pregnancy while on antiepileptics, we recommend dual contraception.   -Folic acid 2 mg is recommended for all females in childbearing age (12-44 years of age).   -Recommend chronic vitamin D supplementation and regular exercise (if not contraindicated).   -Patient/family educated on risk for SUDEP (Sudden Death in Epilepsy). Counseling was provided on the importance of strict medication and follow up compliance. The patient/family understand the risks associated with non-adherence with the medical plan as outlined, including but not limited to an increased risk for breakthrough seizures, which may contribute to injuries, disability, status epilepticus, and even death.   -Counseling was also provided on potential effects of alcohol and other drugs, which may lower seizure threshold and/or affect the metabolism of antiepileptic drugs. We recommend avoidance of alcohol and illegal drugs.  -Avoid sleep deprivation.   -We extensively discussed the aspects related to safety in drivers who suffer from epilepsy. The patient is encourage to report to the Division of Motor Vehicles of any condition and/or spells related to confusion, disorientation, and/or loss of awareness and/or loss of consciousness; as these may pose a safety issue if they occur while operating a motor vehicle. The patient and/or family are ultimately responsible for exercising caution and abiding  to regulations in place.   -Other seizure precautions were discussed at length, including no diving, no skydiving, no climbing or exposure to unprotected heights, no unsupervised swimming, no Jacuzzi or bathing in bathtubs or deep bodies of water. The patient/family have been advised about risks for operating any machinery while suffering from seizures / syncope / epilepsy and/or while taking antiepileptic drugs.   -The patient understands and agrees that due to the complexity of his/her diagnosis, results of any testing and further recommendations will typically be discussed/made during a face to face encounter in my office. The patient and/or family further understands it is their responsibility to keep proper follow up.     Patient/family agree with plan, as outlined.       Orders Placed This Encounter   • MR-BRAIN-WITH & W/O   • CRP QUANTITIVE (NON-CARDIAC)   • Sed Rate   • REFERRAL TO NEURODIAGNOSTICS (EEG,EP,EMG/NCS/DBS)   • escitalopram (LEXAPRO) 5 MG tablet      New Prescriptions    No medications on file      Discontinued Medications    No medications on file        Follow-up: 2 months          BILLING DOCUMENTATION:     I spent a total of 50 minutes of face-to-face time in this visit. Over 50% of the time of the visit today was spent on counseling and/or coordination of care wtih the patient and/or family, as above in assessment in plan.    Layton Casillas MD  Epilepsy and General Neurology  Department of Neurology  Clinical  of Neurology [facility] [employer].

## 2021-06-03 ENCOUNTER — OFFICE VISIT (OUTPATIENT)
Dept: NEUROLOGY | Facility: MEDICAL CENTER | Age: 26
End: 2021-06-03
Attending: STUDENT IN AN ORGANIZED HEALTH CARE EDUCATION/TRAINING PROGRAM
Payer: MEDICAID

## 2021-06-03 VITALS
BODY MASS INDEX: 29.54 KG/M2 | SYSTOLIC BLOOD PRESSURE: 122 MMHG | RESPIRATION RATE: 12 BRPM | TEMPERATURE: 97.9 F | OXYGEN SATURATION: 97 % | DIASTOLIC BLOOD PRESSURE: 78 MMHG | WEIGHT: 183 LBS | HEART RATE: 89 BPM

## 2021-06-03 DIAGNOSIS — R40.4 TRANSIENT ALTERATION OF AWARENESS: ICD-10-CM

## 2021-06-03 DIAGNOSIS — G40.909 SEIZURE DISORDER (HCC): ICD-10-CM

## 2021-06-03 DIAGNOSIS — F31.9 BIPOLAR AFFECTIVE DISORDER, REMISSION STATUS UNSPECIFIED (HCC): ICD-10-CM

## 2021-06-03 DIAGNOSIS — F51.01 PRIMARY INSOMNIA: ICD-10-CM

## 2021-06-03 DIAGNOSIS — F32.9 REACTIVE DEPRESSION: ICD-10-CM

## 2021-06-03 PROCEDURE — 99214 OFFICE O/P EST MOD 30 MIN: CPT | Performed by: STUDENT IN AN ORGANIZED HEALTH CARE EDUCATION/TRAINING PROGRAM

## 2021-06-03 PROCEDURE — 99212 OFFICE O/P EST SF 10 MIN: CPT | Performed by: STUDENT IN AN ORGANIZED HEALTH CARE EDUCATION/TRAINING PROGRAM

## 2021-06-03 ASSESSMENT — ENCOUNTER SYMPTOMS
NEUROLOGICAL NEGATIVE: 1
CARDIOVASCULAR NEGATIVE: 1
GASTROINTESTINAL NEGATIVE: 1
DEPRESSION: 1
INSOMNIA: 0
MUSCULOSKELETAL NEGATIVE: 1
RESPIRATORY NEGATIVE: 1
CONSTITUTIONAL NEGATIVE: 1

## 2021-06-03 ASSESSMENT — FIBROSIS 4 INDEX: FIB4 SCORE: 0.34

## 2021-06-03 ASSESSMENT — LIFESTYLE VARIABLES: SUBSTANCE_ABUSE: 0

## 2021-06-03 NOTE — PROGRESS NOTES
"GENERAL NEUROLOGY - 06/03/2021   REFERRING PROVIDER:  Darien Concepcion M.D.  [address] [place] [address]    REASON FOR VISIT: seizure    HISTORY OF PRESENT ILLNESS:  Zeina Cantu 25 y.o. female presents today to establish care.    Seizures: weird tase, clammy, hands and feet become clonic, lose awareness, has GTCs. As been hospitalized. Post-ictal confusion incoordination and weakness, 15-30 minutes post ictal. Random times per day. Triggers: not eating, occurs in high anxiety situations, poor sleep. Was occurring once per month up untl she got pregnant 10 months ago. Hasn't had anything in 10 month       Boyfriend thinks he may be \"having absence\" seizure but she thinks she just spaces out.    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     Started when 16. Occurring once per month.    Patient's PMH, PSH, FH, and SH were reviewed.  Medications and allergies were reviewed.    INTERVAL HISTORY:    Had 3 seizures, all of which were not normal seizures. First two spaced out, unresponsive, loss of awareness. Aura: felt that she was getting wobbly, feeling flushed, trouble with finding words, word slurring. Had a convulsion lasting a couple minutes. Was wrestling, got hit in the back of the head had a convulsion where  saw her hadns curl up and she was shaking. Lasted 2 minutes.     Normmal she has the convulsion describe above but usually has more prolonged post-ictal period. Postictally she will drag toes and having trouble getting speech back.    No provoking factors.      Was on keppra prior to pregnancy. Stopped taking keppra.     Sleeping OK.      Past medical history:   Past Medical History:   Diagnosis Date   • Anxiety    • Bacterial vaginosis    • Depression    • Epilepsy (HCC)    • Fx clavicle right   • Hepatitis C    • Migraine    • PID (pelvic " inflammatory disease)    • Psychiatric disorder     bipoal - adhd   • Substance abuse (HCC)      Past surgical history:   Past Surgical History:   Procedure Laterality Date   • GYN SURGERY      ovarian cysts   • OTHER      toncils, adenoids, appendix   • OTHER ABDOMINAL SURGERY      telescoping intestines   • TONSILLECTOMY       Family history:   Family History   Problem Relation Age of Onset   • Bipolar disorder Mother    • Depression Mother    • Genitourinary () Problems Mother    • Heart Attack Mother    • Recurrent UTI's Mother    • Sexual abuse Mother    • Stroke Mother    • Alcohol/Drug Father    • Anxiety disorder Father    • Bipolar disorder Father    • Depression Father    • Paranoid behavior Father    • Psychiatric Illness Father      Social history:   Social History     Socioeconomic History   • Marital status: Single     Spouse name: Not on file   • Number of children: Not on file   • Years of education: Not on file   • Highest education level: Not on file   Occupational History   • Not on file   Tobacco Use   • Smoking status: Former Smoker     Packs/day: 0.50     Years: 8.00     Pack years: 4.00     Types: Cigarettes   • Smokeless tobacco: Never Used   Vaping Use   • Vaping Use: Never used   Substance and Sexual Activity   • Alcohol use: Not Currently     Comment: 2x weekly   • Drug use: Not Currently     Types: Inhaled, Intravenous     Comment: [email or web address]   • Sexual activity: Not on file   Other Topics Concern   • Not on file   Social History Narrative   • Not on file     Social Determinants of Health     Financial Resource Strain:    • Difficulty of Paying Living Expenses:    Food Insecurity:    • Worried About Running Out of Food in the Last Year:    • Ran Out of Food in the Last Year:    Transportation Needs:    • Lack of Transportation (Medical):    • Lack of Transportation (Non-Medical):    Physical Activity:    • Days of Exercise per Week:    • Minutes of Exercise per Session:    Stress:    •  Feeling of Stress :    Social Connections:    • Frequency of Communication with Friends and Family:    • Frequency of Social Gatherings with Friends and Family:    • Attends Buddhism Services:    • Active Member of Clubs or Organizations:    • Attends Club or Organization Meetings:    • Marital Status:    Intimate Partner Violence:    • Fear of Current or Ex-Partner:    • Emotionally Abused:    • Physically Abused:    • Sexually Abused:      Current medications:     Current Outpatient Medications:   •  escitalopram, Take 5 mg by mouth., Taking  •  Prenatal MV-Min-Fe Fum-FA-DHA (PRENATAL 1 PO), Take  by mouth., Taking  •  quetiapine, 400 mg, Oral, QHS, Taking   Medication Allergy:  Allergies   Allergen Reactions   • Other Drug      PT STATES SHE DOESN'T TOLERATE THE SEIZURE MEDS AND HAS TOO MANY SIDE EFFECTS   • Latex    • Morphine Unspecified     Pt reports cardiac arrest     Review of systems:   Review of Systems   Constitutional: Negative.    HENT: Negative.    Eyes:        Only when has headaches   Respiratory: Negative.    Cardiovascular: Negative.    Gastrointestinal: Negative.    Genitourinary: Negative.    Musculoskeletal: Negative.    Neurological: Negative.    Endo/Heme/Allergies: Negative.    Psychiatric/Behavioral: Positive for depression. Negative for substance abuse. The patient does not have insomnia.         PHYSICAL EXAM:   Wt Readings from Last 10 Encounters:   06/03/21 83 kg (183 lb)   04/08/21 86.2 kg (190 lb)   03/04/21 102 kg (225 lb)   11/03/20 84.3 kg (185 lb 13.6 oz)   09/14/20 76.3 kg (168 lb 3.2 oz)   06/12/20 73.9 kg (163 lb)   03/10/20 62.1 kg (137 lb)   07/25/19 59.5 kg (131 lb 2.8 oz)   07/24/19 61.2 kg (135 lb)   02/06/19 55.8 kg (123 lb 0.3 oz)      BMI Readings from Last 10 Encounters:   06/03/21 29.54 kg/m²   04/08/21 30.67 kg/m²   03/04/21 36.32 kg/m²   11/03/20 30.93 kg/m²   09/14/20 27.99 kg/m²   06/12/20 26.31 kg/m²   03/10/20 22.11 kg/m²   07/25/19 21.83 kg/m²   07/24/19 22.47  kg/m²   02/06/19 19.86 kg/m²      BP Readings from Last 5 Encounters:   06/03/21 122/78   04/08/21 (!) 92/70   03/04/21 129/79   11/03/20 121/67   09/14/20 122/72       SpO2 Readings from Last 5 Encounters:   06/03/21 97%   04/08/21 97%   03/04/21 97%   11/03/20 98%   09/14/20 94%      Pulse Readings from Last 5 Encounters:   06/03/21 89   04/08/21 92   03/04/21 78   11/03/20 81   09/14/20 (!) 101      General: Patient in no acute distress, pleasant and cooperative.  HEENT: Normocephalic, no signs of acute trauma.   Neck: appears supple, there is normal range of motion. No tenderness on exam.   Chest: clear. Symmetrical chest rise with inhalation. No cough.   CV: RRR, no murmurs.   Skin: no signs of acute rashes or trauma.   Musculoskeletal: joints exhibit full range of motion. There are no signs of joint or muscle swelling. No joint deformity noted  Psychiatric: pertinent positives as discussed above  NEUROLOGICAL EXAM:   Neurological Exam  Mental Status  Awake, alert and oriented to person, place and time. Speech is normal. Language is fluent with no aphasia. Attention and concentration are normal.    Cranial Nerves  CN II: Right funduscopic exam: not visualized. Left funduscopic exam: not visualized.  CN III, IV, VI: Extraocular movements intact bilaterally. Normal lids and orbits bilaterally.   Right pupil: 4 mm. Round. Reactive to accommodation.   Left pupil: 4 mm. Round. Reactive to light. Reactive to accommodation.  CN V: Facial sensation is normal.  CN VII: Full and symmetric facial movement.  CN XI: Shoulder shrug strength is normal.    Motor   Strength is 5/5 throughout all four extremities.    Sensory  Sensation is intact to light touch, pinprick, vibration and proprioception in all four extremities.    Reflexes                                           Right                      Left  Brachioradialis                    1+                         1+  Biceps                                 1+                          1+  Hamstring                            1+                         1+  Patellar                                1+                         1+  Achilles                                1+                         1+    Coordination  Finger-to-nose, rapid alternating movements and heel-to-shin normal bilaterally without dysmetria.    Gait  Normal casual, toe, heel and tandem gait.       ALL ANCILLARY DATA (LISTED BELOW) REVIEWED:   LABS:  Reviewed  No results found for: YPTWYR4BU, QAKSK2DTR, LJMZOA0WH, GVH4NNB, UMV4KZS, SLN9ABP, SARCOVABIG, SARSABIGM, SARSABIGA, SARCOVAB, COVRDRPG, COVNGENE, RNABYNAA, ANTIGNFIA, ABQUAL, SARCOVAB, SJBGCQ9UQ   No results found for: CHOLSTRLTOT, TRIGLYCERIDE, HDL, LDL, CHOLHDLRAT, NONHDL   Admission on 11/03/2020, Discharged on 11/03/2020   Component Date Value Ref Range Status   • WBC 11/03/2020 10.5  4.8 - 10.8 K/uL Final   • RBC 11/03/2020 4.01* 4.20 - 5.40 M/uL Final   • Hemoglobin 11/03/2020 12.3* 13.0 - 17.0 g/dL Final   • Hematocrit 11/03/2020 36.5* 39.0 - 50.0 % Final   • MCV 11/03/2020 91.0  81.0 - 99.0 fL Final   • MCH 11/03/2020 30.7  27.0 - 31.0 pg Final   • MCHC 11/03/2020 33.7  33.0 - 37.0 g/dL Final   • RDW 11/03/2020 12.9  11.5 - 14.5 % Final   • Platelet Count 11/03/2020 276  130 - 400 K/uL Final   • MPV 11/03/2020 9.3  7.4 - 10.4 fL Final   • Neutrophils Automated 11/03/2020 71.3* 39.0 - 70.0 % Final   • Lymphocytes Automated 11/03/2020 21.6  21.0 - 50.0 % Final   • Monocytes Automated 11/03/2020 5.5  2.0 - 9.0 % Final   • Eosinophils Automated 11/03/2020 0.9  0.0 - 5.0 % Final   • Basophils Automated 11/03/2020 0.2  0.0 - 3.0 % Final   • Abs Neutrophils Automated 11/03/2020 7.5  1.8 - 7.7 K/uL Final   • Abs Lymph Automated 11/03/2020 2.3  1.2 - 4.8 K/uL Final   • Eosinophil Count, Blood 11/03/2020 0.09  0.00 - 0.50 K/uL Final   • Sodium 11/03/2020 138  136 - 145 mmol/L Final   • Potassium 11/03/2020 4.0  3.5 - 5.1 mmol/L Final   • Chloride 11/03/2020 104  98 -  107 mmol/L Final   • Co2 11/03/2020 20* 21 - 32 mmol/L Final   • Anion Gap 11/03/2020 18  10 - 18 mmol/L Final   • Glucose 11/03/2020 98  74 - 99 mg/dL Final   • Bun 11/03/2020 12  7 - 18 mg/dL Final   • Creatinine 11/03/2020 0.6  0.6 - 1.0 mg/dL Final   • Calcium 11/03/2020 8.9  8.5 - 11.0 mg/dL Final   • AST(SGOT) 11/03/2020 15  15 - 37 U/L Final   • ALT(SGPT) 11/03/2020 16  12 - 78 U/L Final   • Alkaline Phosphatase 11/03/2020 74  46 - 116 U/L Final   • Total Bilirubin 11/03/2020 0.3  0.2 - 1.0 mg/dL Final   • Albumin 11/03/2020 2.9* 3.4 - 5.0 g/dL Final   • Total Protein 11/03/2020 7.6  6.4 - 8.2 g/dL Final   • A-G Ratio 11/03/2020 0.6   Final   • Lipase 11/03/2020 80  73 - 393 U/L Final   • Troponin I 11/03/2020 <0.02  0.00 - 0.06 ng/mL Final    Comment: **Note Reference Range change effective 3/31/2017.**  TROPONIN I  INTERPRETATION  <=0.06    Is not suggestive of myocardial damage.  >0.06    Is suggestive of myocardial damage in the past 9 days.  Serial Troponin I testing and clinical correlation suggested.     • GFR If  11/03/2020 >60  >60 mL/min/1.73 m 2 Final   • GFR If Non  11/03/2020 >60  >60 mL/min/1.73 m 2 Final      Records, including outside reports and diagnostic testing, reviewed:   Reviewed  PROCEDURES  No results found for this or any previous visit.   Last Nuclear Medicine Stress Test  No results found for this or any previous visit.       IMAGING  Reviewed if available  No image results found.     CTs                          Results for orders placed during the hospital encounter of 05/09/17   CT-SOFT TISSUE NECK WITH    Impression 1.  There is extensive submandibular adenopathy present. In a patient of this age is most likely etiology is infection. However follow-up to assure resolution is warranted.  2.  No abscess identified.    Bhupendra Gtz MD  5/9/2017 1:57 PM    DLP Reporting Thresholds for Incorrect/Repeated Exams - DLP in mGy*cm  Head/Neck:   0-year-old 3840, 1-year-old 5880, 5-year-old 8770, 10-year-old 59238 and adult 85229  Head:  0-year-old 4540, 1-year-old 7460, 5-year-old 57970, 10-year-old 71919 and adult 62107  Neck:  0-year-old 2940, 1-year-old 4160, 5-year-old 4550, 10-year-old 6320 and adult 8470  Chest:  0-year-old 550, 1-year-old 830, 5-year-old 1200, 10-year-old 3840 and adult 3570  Abd/pelvis:  0-year-old 440, 1-year-old 720, 5-year-old 1080, 10-year-old 3330 and adult 3330  Trunk(C/A/P):  0-year-old 490, 1-year-old 770, 5-year-old 1140, 10-year-old 3570 and adult 3330            Results for orders placed during the hospital encounter of 11/01/16   CT-CHEST (THORAX) WITH    Addendum Addendum:  Partially included in the study is a mildly displaced fracture of the  middle third of the right clavicle.  I do not appreciate a substantial hematoma, or injury to the subclavian  vasculature.  The glenohumeral joint appears intact. The scapula appears intact.    Please additionally note the presence of a mildly displaced fracture of  the middle third of the right clavicle.      Jeramie Inman M.D. 11/2/2016  8:18 AM          Impression There are subtle subpleural groundglass densities involving the anterior margins of the right upper and right middle lobes, probably relating to lung contusion, given the scenario of trauma.    The overlying ribs, however, appear intact.    Otherwise unremarkable CT scan of the chest.    Jeramie Inman MD  11/2/2016 8:05 AM    DLP Reporting Thresholds for Incorrect/Repeated Exams - DLP in mGy*cm  Head/Neck:  0-year-old 3840, 1-year-old 5880, 5-year-old 8770, 10-year-old 59804 and adult 33270  Head:  0-year-old 4540, 1-year-old 7460, 5-year-old 63590, 10-year-old 81288 and adult 67480  Neck:  0-year-old 2940, 1-year-old 4160, 5-year-old 4550, 10-year-old 6320 and adult 8470  Chest:  0-year-old 550, 1-year-old 830, 5-year-old 1200, 10-year-old 3840 and adult 3570  Abd/pelvis:  0-year-old 440, 1-year-old 720, 5-year-old  1080, 10-year-old 3330 and adult 3330  Trunk(C/A/P):  0-year-old 490, 1-year-old 770, 5-year-old 1140, 10-year-old 3570 and adult 3330                          Results for orders placed during the hospital encounter of 03/11/20   CT-ABDOMEN-PELVIS WITH    Impression 1.  No acute abnormality.  2.  Hepatomegaly                        MRIs                                             Results for orders placed during the hospital encounter of 07/24/19   MR-LUMBAR SPINE-WITH & W/O    Impression 1. MRI OF THE LUMBAR SPINE WITHOUT AND WITH CONTRAST WITHIN NORMAL LIMITS.                                                                                                           XRs      Results for orders placed during the hospital encounter of 11/01/16   DX-ANKLE 3+ VIEWS RIGHT    Impression Negative examination.            Results for orders placed during the hospital encounter of 02/07/19   DX-CHEST-2 VIEWS    Impression 1.  No acute cardiopulmonary disease.      Results for orders placed in visit on 11/28/16   DX-CLAVICLE RIGHT    Impression This demonstrates no significant shortening of the clavicle.  It  still remains with a step-off.  There is no obvious healing  present yet.        Results for orders placed during the hospital encounter of 11/06/16   DX-ELBOW-COMPLETE 3+ RIGHT    Impression No fracture.    Extensive soft tissue edema and soft tissue swelling.  11/7/2016 8:09 AM                                      Results for orders placed during the hospital encounter of 05/12/13   DX-KNEE COMPLETE 4+    Addendum   HISTORY/REASON FOR EXAM:  Pain/Deformity Following Trauma.   TECHNIQUE/EXAM DESCRIPTION AND NUMBER OF VIEWS:  right knee, 4 views,  5/12/2013 11:30 PM.  COMPARISON: None.  FINDINGS: There is no evidence of fracture, dislocation or joint effusion. The joint  spaces are normal.   Normal right knee.   Interpreted at [facility]      Jeffrey V Behar, M.D. 5/23/2013  7:44 AM          Impression Normal right  knee.      Interpreted at [facility]          Results for orders placed during the hospital encounter of 11/01/16   DX-PELVIS-1 OR 2 VIEWS    Impression No fracture seen.    Devin Escalante MD  11/2/2016 8:27 AM                      Results for orders placed during the hospital encounter of 11/03/20   DX-SHOULDER 2+ LEFT    Impression No fracture.    Kobe Wakefield MD  11/3/2020 1:09 PM                   No image results found.     EEG:  Reviewed if available  Misc:  PF Readings from Last 10 Encounters:   No data found for PF      Assessment & Plan  , Education/Counseling:  This is a 25 y.o. FA Outcome     who presents to the neurology clinic with the following:  Visit Diagnoses     ICD-10-CM   1. Seizure disorder ([facility])  G40.909   2. Primary insomnia  F51.01   3. Bipolar affective disorder, remission status unspecified (HCC)  F31.9   4. Transient alteration of awareness  R40.4   5. Reactive depression  F32.9        We had an extensive discussion about the patient's symptoms, signs, and work-up to date, if any. We discussed potential and/or definitive diagnoses, work-up, and potential treatments. If applicable, work-up (labs, imaging, other testing), referrals, and recommended treatment strategies are listed below.    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 Seizure free for 10 months while off AEDs now has a return of seizures, somewhat of different semiology. Ambulatory EEG to capture events. Admits that they be nonepileptic. Will defer restarting Keppra at this time.  Needs work-up with EEG ambulatory and MRI brain. Labs as below too. May consider EMU admission.    EDUCATION AND COUNSELING:  -Education was provided to the patient and/or family regarding diagnosis and prognosis. The chronic and unpredictable nature of the condition were discussed. There is increased risk for additional events, which may carry potential for significant injuries and death. Discussed  frequent seizure triggers: sleep deprivation, medication non-compliance, use of illegal drugs/alcohol, stress, and others.   -We reviewed in detail the current antiepileptic regimen. Potential side effects of antiepileptics were discussed at length, including but no limited to: hypersensitivity reactions (rash and others, some of which can be fatal), visual field changes (some of which may be irreversible), glaucoma, diplopia, kidney stones, osteopenia/osteoporosis/bone fractures, hyperthermia/anhydrosis, hyponatremia, tremors/abnormal movements, ataxia, dizziness, fatigue, increased risk for falls, risk for cardiac arrhythmias/syncope, gastrointestinal side effects(hepatitis, pancreatitis, gastritis, ulcers), gingival hypertrophy/bleeding, drowsiness, sedation, anxiety/nervousness, increased risk for suicide, increased risk for depression, and psychosis.   -We also reviewed drug-drug interactions and their potential effect on seizure control and medication side effects.    -We also discussed in detail potential effects of seizures, epilepsy, and medications during pregnancy, including but not limited to fetal malformations, child developmental/intellectual disability, fetal/ risk for hemorrhages, stillbirth, maternal death, premature birth, and others. The patient/family aware that pregnancy should be avoided, unless desired, in which case we recommend discussing with us at least a year prior to planned conception. To avoid undesired pregnancy while on antiepileptics, we recommend dual contraception.   -Folic acid 2 mg is recommended for all females in childbearing age (12-44 years of age).   -Recommend chronic vitamin D supplementation and regular exercise (if not contraindicated).   -Patient/family educated on risk for SUDEP (Sudden Death in Epilepsy). Counseling was provided on the importance of strict medication and follow up compliance. The patient/family understand the risks associated with  non-adherence with the medical plan as outlined, including but not limited to an increased risk for breakthrough seizures, which may contribute to injuries, disability, status epilepticus, and even death.   -Counseling was also provided on potential effects of alcohol and other drugs, which may lower seizure threshold and/or affect the metabolism of antiepileptic drugs. We recommend avoidance of alcohol and illegal drugs.  -Avoid sleep deprivation.   -We extensively discussed the aspects related to safety in drivers who suffer from epilepsy. The patient is encourage to report to the Division of Motor Vehicles of any condition and/or spells related to confusion, disorientation, and/or loss of awareness and/or loss of consciousness; as these may pose a safety issue if they occur while operating a motor vehicle. The patient and/or family are ultimately responsible for exercising caution and abiding to regulations in place.   -Other seizure precautions were discussed at length, including no diving, no skydiving, no climbing or exposure to unprotected heights, no unsupervised swimming, no Jacuzzi or bathing in bathtubs or deep bodies of water. The patient/family have been advised about risks for operating any machinery while suffering from seizures / syncope / epilepsy and/or while taking antiepileptic drugs.   -The patient understands and agrees that due to the complexity of his/her diagnosis, results of any testing and further recommendations will typically be discussed/made during a face to face encounter in my office. The patient and/or family further understands it is their responsibility to keep proper follow up.     Patient/family agree with plan, as outlined.       Orders Placed This Encounter   • CBC WITH DIFFERENTIAL   • Comp Metabolic Panel   • REFERRAL TO NEURODIAGNOSTICS (EEG,EP,EMG/NCS/DBS)      New Prescriptions    No medications on file      Discontinued Medications    No medications on file         Follow-up: 2 months          BILLING DOCUMENTATION:     I spent a total of 30 minutes of face-to-face time in this visit. Over 50% of the time of the visit today was spent on counseling and/or coordination of care wtih the patient and/or family, as above in assessment in plan.    Layton Casillas MD  Epilepsy and General Neurology  Department of Neurology  Clinical  of Neurology [facility].

## 2021-10-08 ENCOUNTER — HOSPITAL ENCOUNTER (EMERGENCY)
Facility: MEDICAL CENTER | Age: 26
End: 2021-10-09
Attending: EMERGENCY MEDICINE
Payer: MEDICAID

## 2021-10-08 DIAGNOSIS — R45.1 AGITATION: ICD-10-CM

## 2021-10-08 DIAGNOSIS — R40.4 TRANSIENT ALTERATION OF AWARENESS: ICD-10-CM

## 2021-10-08 LAB
AMPHET UR QL SCN: NEGATIVE
BARBITURATES UR QL SCN: NEGATIVE
BENZODIAZ UR QL SCN: NEGATIVE
BZE UR QL SCN: NEGATIVE
CANNABINOIDS UR QL SCN: POSITIVE
METHADONE UR QL SCN: NEGATIVE
OPIATES UR QL SCN: NEGATIVE
OXYCODONE UR QL SCN: NEGATIVE
PCP UR QL SCN: NEGATIVE
POC BREATHALIZER: 0 PERCENT (ref 0–0.01)
PROPOXYPH UR QL SCN: NEGATIVE

## 2021-10-08 PROCEDURE — 700102 HCHG RX REV CODE 250 W/ 637 OVERRIDE(OP): Performed by: EMERGENCY MEDICINE

## 2021-10-08 PROCEDURE — 99284 EMERGENCY DEPT VISIT MOD MDM: CPT

## 2021-10-08 PROCEDURE — 302970 POC BREATHALIZER: Performed by: EMERGENCY MEDICINE

## 2021-10-08 PROCEDURE — 80307 DRUG TEST PRSMV CHEM ANLYZR: CPT

## 2021-10-08 PROCEDURE — A9270 NON-COVERED ITEM OR SERVICE: HCPCS | Performed by: EMERGENCY MEDICINE

## 2021-10-08 RX ORDER — LORAZEPAM 1 MG/1
0.5 TABLET ORAL ONCE
Status: COMPLETED | OUTPATIENT
Start: 2021-10-08 | End: 2021-10-08

## 2021-10-08 RX ORDER — SODIUM CHLORIDE 9 MG/ML
1000 INJECTION, SOLUTION INTRAVENOUS ONCE
Status: DISCONTINUED | OUTPATIENT
Start: 2021-10-08 | End: 2021-10-08

## 2021-10-08 RX ADMIN — LORAZEPAM 0.5 MG: 1 TABLET ORAL at 22:09

## 2021-10-08 ASSESSMENT — FIBROSIS 4 INDEX: FIB4 SCORE: 0.35

## 2021-10-09 VITALS
OXYGEN SATURATION: 94 % | HEART RATE: 73 BPM | TEMPERATURE: 97.8 F | RESPIRATION RATE: 18 BRPM | WEIGHT: 182.98 LBS | SYSTOLIC BLOOD PRESSURE: 131 MMHG | BODY MASS INDEX: 29.41 KG/M2 | DIASTOLIC BLOOD PRESSURE: 81 MMHG | HEIGHT: 66 IN

## 2021-10-09 NOTE — ED NOTES
"Pt able to ambulate without complication. Pt states that she is tired but other wise \"fine\". ERP aware   "

## 2021-10-09 NOTE — ED TRIAGE NOTES
"Chief Complaint   Patient presents with   • Drug Overdose     Pt states took \"too many Naltrexone\", unable to state how many   • Alcohol Intoxication     Pt states drinks about 1 pint of Vodka     /75   Pulse (!) 105   Temp 36.7 °C (98 °F) (Temporal)   Resp 20   Ht 1.676 m (5' 6\")   Wt 83 kg (182 lb 15.7 oz)   LMP 09/12/2021   SpO2 94%   BMI 29.53 kg/m²     Has this patient been vaccinated for COVID NO  If not, would they like to be vaccinated while in the ER if eligible?  NO  Would the patient like to speak with the ERP about the possibility of receiving the COVID vaccine today before making a decision? NO    Pt BIB [facility] from [facility] for c/o restlessness, possible drug overdose and alcohol intoxication.  Pt denies SI and HI.    "

## 2021-10-09 NOTE — ED NOTES
Pt provided with discharge paper work and follow up care. Pt called  who is to come and pick her up. No other concerns or questions at this time. Pt to ambulate out of ER.

## 2021-10-09 NOTE — ED PROVIDER NOTES
"ED Provider Note    CHIEF COMPLAINT  Chief Complaint   Patient presents with   • Drug Overdose     Pt states took \"too many Naltrexone\", unable to state how many   • Alcohol Intoxication     Pt states drinks about 1 pint of Vodka       HPI  Zeina Cantu is a 26 y.o. female here for evaluation of alcohol abuse.  The pt states she drank 'a pint of vodka' earlier today.  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  She denies si/hi.  She has no cp, no sob, no vomiting, no abdominal pain.  She states she only took three pills tonight, and did not overdose.     ROS;  Please see HPI  O/W negative     PAST MEDICAL HISTORY   has a past medical history of Anxiety, Bacterial vaginosis, Depression, Epilepsy (HCC), Fx clavicle (right), Hepatitis C, Migraine, PID (pelvic inflammatory disease), Psychiatric disorder, and Substance abuse ([facility]).    SOCIAL HISTORY  Social History     Tobacco Use   • Smoking status: Current Every Day Smoker     Packs/day: 0.50     Years: 8.00     Pack years: 4.00     Types: Cigarettes   • Smokeless tobacco: Never Used   Vaping Use   • Vaping Use: Never used   Substance and Sexual Activity   • Alcohol use: Yes   • Drug use: Not Currently     Types: Inhaled, Intravenous   • Sexual activity: Not on file       SURGICAL HISTORY   has a past surgical history that includes other; other abdominal surgery; gyn surgery; and tonsillectomy.    CURRENT MEDICATIONS  Home Medications    **Home medications have not yet been reviewed for this encounter**         ALLERGIES  Allergies   Allergen Reactions   • Other Drug      PT STATES SHE DOESN'T TOLERATE THE SEIZURE MEDS AND HAS TOO MANY SIDE EFFECTS   • Latex    • Morphine Unspecified     Pt reports cardiac arrest       REVIEW OF SYSTEMS  See HPI for further details. Review of " "systems as above, otherwise all other systems are negative.     PHYSICAL EXAM  VITAL SIGNS: /75   Pulse (!) 105   Temp 36.7 °C (98 °F) (Temporal)   Resp 20   Ht 1.676 m (5' 6\")   Wt 83 kg (182 lb 15.7 oz)   LMP 09/12/2021   SpO2 94%   BMI 29.53 kg/m²     Constitutional: Well developed, well nourished. moderate acute distress.  HEENT: Normocephalic, atraumatic. MMM  Neck: Supple, Full range of motion   Chest/Pulmonary:  No respiratory distress.  Equal expansion   Musculoskeletal: No deformity, no edema, neurovascular intact.   Neuro: Clear speech, appropriate, cooperative, cranial nerves II-XII grossly intact.  Taveras x 4.    Psych: agitated mood and affect.        PROCEDURES     MEDICAL RECORD  I have reviewed patient's medical record and pertinent results are listed.    COURSE & MEDICAL DECISION MAKING  I have reviewed any medical record information, laboratory studies and radiographic results as noted above.    9:06 PM  The pt states that she initially took ' a bunch of pills' but she actually, when being re questioned multiple times, states she only took her night meds. No overdose.   No  Si/hi.   Her drug screen is pending.  We will have her  pick her up when she is settled.     I you have had any blood pressure issues while here in the emergency department, please see your doctor for a further evaluation or work up.    Differential diagnoses include but not limited to: overdose, etoh,si/hi.  Agitation.     This patient presents with agitation .  At this time, I have counseled the patient/family regarding their medications, pain control, and follow up.  They will continue their medications, if any, as prescribed.  They will return immediately for any worsening symptoms and/or any other medical concerns.  They will see their doctor, or contact the doctor provided, in 1-2 days for follow up.       FINAL IMPRESSION  agitated       Electronically signed by: Aguilar Godfrey D.O., 10/8/2021 8:59 " PM

## 2021-11-20 ENCOUNTER — HOSPITAL ENCOUNTER (EMERGENCY)
Facility: MEDICAL CENTER | Age: 26
End: 2021-11-20
Attending: EMERGENCY MEDICINE
Payer: MEDICAID

## 2021-11-20 VITALS
WEIGHT: 180 LBS | DIASTOLIC BLOOD PRESSURE: 48 MMHG | SYSTOLIC BLOOD PRESSURE: 89 MMHG | HEIGHT: 66 IN | HEART RATE: 85 BPM | BODY MASS INDEX: 28.93 KG/M2 | RESPIRATION RATE: 27 BRPM | TEMPERATURE: 97 F | OXYGEN SATURATION: 94 %

## 2021-11-20 DIAGNOSIS — T50.901A ACCIDENTAL DRUG OVERDOSE, INITIAL ENCOUNTER: ICD-10-CM

## 2021-11-20 DIAGNOSIS — F19.10 POLYSUBSTANCE ABUSE (HCC): ICD-10-CM

## 2021-11-20 LAB
AMPHET UR QL SCN: NEGATIVE
BARBITURATES UR QL SCN: NEGATIVE
BENZODIAZ UR QL SCN: POSITIVE
BZE UR QL SCN: POSITIVE
CANNABINOIDS UR QL SCN: POSITIVE
METHADONE UR QL SCN: NEGATIVE
OPIATES UR QL SCN: NEGATIVE
OXYCODONE UR QL SCN: NEGATIVE
PCP UR QL SCN: NEGATIVE
PROPOXYPH UR QL SCN: NEGATIVE

## 2021-11-20 PROCEDURE — 700105 HCHG RX REV CODE 258: Performed by: EMERGENCY MEDICINE

## 2021-11-20 PROCEDURE — 96374 THER/PROPH/DIAG INJ IV PUSH: CPT

## 2021-11-20 PROCEDURE — 700111 HCHG RX REV CODE 636 W/ 250 OVERRIDE (IP): Performed by: EMERGENCY MEDICINE

## 2021-11-20 PROCEDURE — 80307 DRUG TEST PRSMV CHEM ANLYZR: CPT

## 2021-11-20 PROCEDURE — 99285 EMERGENCY DEPT VISIT HI MDM: CPT

## 2021-11-20 RX ORDER — SODIUM CHLORIDE 9 MG/ML
1000 INJECTION, SOLUTION INTRAVENOUS ONCE
Status: COMPLETED | OUTPATIENT
Start: 2021-11-20 | End: 2021-11-20

## 2021-11-20 RX ORDER — NALOXONE HYDROCHLORIDE 0.4 MG/ML
0.2 INJECTION, SOLUTION INTRAMUSCULAR; INTRAVENOUS; SUBCUTANEOUS ONCE
Status: COMPLETED | OUTPATIENT
Start: 2021-11-20 | End: 2021-11-20

## 2021-11-20 RX ORDER — ARIPIPRAZOLE 10 MG/1
10 TABLET ORAL EVERY EVENING
Status: SHIPPED | COMMUNITY
End: 2023-09-28

## 2021-11-20 RX ADMIN — SODIUM CHLORIDE 1000 ML: 9 INJECTION, SOLUTION INTRAVENOUS at 08:09

## 2021-11-20 RX ADMIN — NALOXONE HYDROCHLORIDE 0.2 MG: 0.4 INJECTION, SOLUTION INTRAMUSCULAR; INTRAVENOUS; SUBCUTANEOUS at 08:09

## 2021-11-20 ASSESSMENT — FIBROSIS 4 INDEX: FIB4 SCORE: 0.35

## 2021-11-20 NOTE — ED NOTES
Patient's blood pressure found to be low, orders received. Medicated per MAR.     Patient became nauseous and developed a severe headache after Narcan. Patient admitted to taking a pill of Fentanyl prior to using cocaine. ERP updated.

## 2021-11-20 NOTE — ED TRIAGE NOTES
"Chief Complaint   Patient presents with   • Drug Overdose     Pt stated she took cocaine last night at approx 2200. She stated she has done cocaine before but felt  usually \"sleepy\" after this use and went to bed        Per EMS  found her in the morning agonal breathing, called 911 and started CPR. On EMS arrival to scene, EMS continued CPR, inserted an NPA, and provided bag ventilations. EMS did not give narcan.  Pt vomited prior to arrival, EMS gave oral Zofran. Pt AOx4 on ER arrival, 78% on room air and placed on 2L via NC. Pt denies SI attempt, states she thinks the cocaine was laced.   "

## 2021-11-20 NOTE — ED PROVIDER NOTES
"ED Provider Note    Scribed for Roger Cloud M.D. by Roberto Gardner. 11/20/2021  7:30 AM    Primary care provider: Pcp Pt States None  Means of arrival: EMS  History obtained from: Patient  History limited by: None    CHIEF COMPLAINT  Chief Complaint   Patient presents with    Drug Overdose     Pt stated she took cocaine last night at approx 2200. She stated she has done cocaine before but felt  usually \"sleepy\" after this use and went to bed       HPI  Zeina Cantu is a 26 y.o. female who presents to the Emergency Department for a drug overdose onset last night around 10 PM. The patient thought she took cocaine but was extremely tired after taking it. She denies using opiates, saying she stopped using heroin two years ago. The patient denies associated fevers.    REVIEW OF SYSTEMS  Pertinent positives include drug overdose.   Pertinent negatives include no fevers.    All other systems reviewed and negative. See HPI for further details.       PAST MEDICAL HISTORY   has a past medical history of Anxiety, Bacterial vaginosis, Depression, Epilepsy (HCC), Fx clavicle (right), Hepatitis C, Migraine, PID (pelvic inflammatory disease), Psychiatric disorder, and Substance abuse ([facility]).    SURGICAL HISTORY   has a past surgical history that includes other; other abdominal surgery; gyn surgery; and tonsillectomy.    SOCIAL HISTORY  Social History     Tobacco Use    Smoking status: Current Every Day Smoker     Packs/day: 0.50     Years: 8.00     Pack years: 4.00     Types: Cigarettes    Smokeless tobacco: Never Used   Vaping Use    Vaping Use: Never used   Substance Use Topics    Alcohol use: Yes    Drug use: Yes     Types: Inhaled     Comment: cocaine, marijuana       Social History     Substance and Sexual Activity   Drug Use Yes    Types: Inhaled    Comment: cocaine, marijuana        FAMILY HISTORY  Family History   Problem Relation Age of Onset    Bipolar disorder Mother     Depression Mother     " "Genitourinary () Problems Mother     Heart Attack Mother     Recurrent UTI's Mother     Sexual abuse Mother     Stroke Mother     Alcohol/Drug Father     Anxiety disorder Father     Bipolar disorder Father     Depression Father     Paranoid behavior Father     Psychiatric Illness Father        CURRENT MEDICATIONS  Home Medications       Reviewed by Elle Pringle R.N. (Registered Nurse) on 11/20/21 at 0715  Med List Status: Not Addressed     Medication Last Dose Status   ARIPiprazole (ABILIFY) 10 MG Tab  Active   escitalopram (LEXAPRO) 5 MG tablet  Active   Prenatal MV-Min-Fe Fum-FA-DHA (PRENATAL 1 PO)  Active   quetiapine (SEROQUEL) 400 MG tablet  Active                    ALLERGIES  Allergies   Allergen Reactions    Other Drug      PT STATES SHE DOESN'T TOLERATE THE SEIZURE MEDS AND HAS TOO MANY SIDE EFFECTS    Latex     Morphine Unspecified     Pt reports cardiac arrest       PHYSICAL EXAM  VITAL SIGNS: BP (!) 99/63   Pulse (!) 106   Temp 36.3 °C (97.3 °F) (Temporal)   Resp 17   Ht 1.676 m (5' 6\")   Wt 81.6 kg (180 lb)   SpO2 95%   BMI 29.05 kg/m²     Nursing note and vitals reviewed.  Constitutional: Well-developed and well-nourished. No distress. Somnolent. Seems intoxicated on drugs or alcohol.  HENT: Head is normocephalic and atraumatic. Oropharynx is clear and moist without exudate or erythema.   Eyes: Pupils are equal, round, and reactive to light. Conjunctiva are normal.   Cardiovascular: Normal rate and regular rhythm. No murmur heard. Normal radial pulses.  Pulmonary/Chest: Breath sounds normal. No wheezes or rales.   Abdominal: Soft and non-tender. No distention    Musculoskeletal: Extremities exhibit normal range of motion without edema or tenderness.   Neurological: Awake, alert and oriented to person, place, and time. No focal deficits noted.  Skin: Skin is warm and dry. No rash.   Psychiatric: Normal mood and affect. Appropriate for clinical situation.    DIAGNOSTIC STUDIES / " PROCEDURES    LABS  Results for orders placed or performed during the hospital encounter of 11/20/21   URINE DRUG SCREEN   Result Value Ref Range    Amphetamines Urine Negative Negative    Barbiturates Negative Negative    Benzodiazepines Positive (A) Negative    Cocaine Metabolite Positive (A) Negative    Methadone Negative Negative    Opiates Negative Negative    Oxycodone Negative Negative    Phencyclidine -Pcp Negative Negative    Propoxyphene Negative Negative    Cannabinoid Metab Positive (A) Negative       All labs reviewed by me.    COURSE & MEDICAL DECISION MAKING  Nursing notes, VS, PMSFHx reviewed in chart.     Review of past medical records shows the patient was here for a drug overdose 10/8/21.     7:30 AM - Patient seen and examined at bedside. Ordered Urine Drug Screen to evaluate her symptoms. The differential diagnoses include but are not limited to: drug overdose.     8:04 AM On repeat evaluation, nurse told me the patient's blood pressure is dropping and still drowsy. She will be treated with Narcan 0.2 mg and NS 1,000 mL for her symptoms. Intravenous fluid will be administered for hypotension and continued drowsiness.    8:13 AM On repeat evaluation, nurse told me the patient admitted to taking an oral pill of fentanyl before taking cocaine.    10:00 AM On repeat evaluation, patient is negative for opiates and positive for benzoates, cocaine, and cannabinoids.      Patient presents today after an apparent accidental overdose. Urine drug screen positive for opiates, benzodiazepines, marijuana. Patient thought that she took a dose of fentanyl and some cocaine. Maybe she took benzodiazepines and cocaine. Frankly is unclear. Patient is remorseful. She knows that she has a young child and  to care for her. She is going to go home and hug her child and states that she does not plan to hang out with that same friend anymore. She says that she plans to stop using drugs.  is supportive and  at the bedside.    HYDRATION: Based on the patient's presentation of Hypotension and Other continued drowsiness the patient was given IV fluids. IV Hydration was used because oral hydration was not adequate alone. Upon recheck following hydration, the patient was improved.      The patient will return for new or worsening symptoms and is stable at the time of discharge.    The patient is referred to a primary physician for blood pressure management, diabetic screening, and for all other preventative health concerns.      Ms. Cantu was here with a confirmed overdose of T40.5 - Cocaine, T40.7 - Cannabis, and T42 - Antiepileptic, sedative-hypnotic, or anti-Parkinsonism drugs; she has other known overdoses: T42 - Antiepileptic, sedative-hypnotic, or anti-Parkinsonism drugs.      DISPOSITION:  Patient will be discharged home in stable condition.    FOLLOW UP:  [facility], Emergency Dept  [facility] [facility] [id number] [phone]          OUTPATIENT MEDICATIONS:  Discharge Medication List as of 11/20/2021 11:10 AM            FINAL IMPRESSION  1. Accidental drug overdose, initial encounter    2. Polysubstance abuse (HCC)          Roberto GARCIA (Scribe), am scribing for, and in the presence of, Roger Cloud M.D..    Electronically signed by: Roberto Gardner (Evette), 11/20/2021    Roger GARCIA M.D. personally performed the services described in this documentation, as scribed by Roberto Gardner in my presence, and it is both accurate and complete.    The note accurately reflects work and decisions made by me.  Roger Cloud M.D.  11/20/2021  11:37 AM    E

## 2021-11-20 NOTE — ED NOTES
Patient rounded on and assisted to bedside commode for UDS collection. Patient reports improvement with nausea and is now sleeping.

## 2021-11-20 NOTE — ED NOTES
Discharge teaching and paperwork provided regarding accidental drug overdose and all questions/concerns answered. VSS, neuro and respiratory assessment stable and PIV removed. Given information regarding home care and reasons to follow up with ED or primary MD. Patient discharged to the care of her  and ambulated out of the ED. Patient also provided pants and to be driven home by her .

## 2022-03-31 ENCOUNTER — APPOINTMENT (OUTPATIENT)
Dept: NEUROLOGY | Facility: MEDICAL CENTER | Age: 27
End: 2022-03-31
Attending: STUDENT IN AN ORGANIZED HEALTH CARE EDUCATION/TRAINING PROGRAM
Payer: MEDICAID

## 2022-05-11 ENCOUNTER — OFFICE VISIT (OUTPATIENT)
Dept: NEUROLOGY | Facility: MEDICAL CENTER | Age: 27
End: 2022-05-11
Attending: STUDENT IN AN ORGANIZED HEALTH CARE EDUCATION/TRAINING PROGRAM
Payer: MEDICAID

## 2022-05-11 VITALS
HEART RATE: 74 BPM | DIASTOLIC BLOOD PRESSURE: 74 MMHG | BODY MASS INDEX: 25.3 KG/M2 | TEMPERATURE: 98 F | WEIGHT: 156.75 LBS | OXYGEN SATURATION: 98 % | SYSTOLIC BLOOD PRESSURE: 112 MMHG

## 2022-05-11 DIAGNOSIS — F31.9 BIPOLAR AFFECTIVE DISORDER, REMISSION STATUS UNSPECIFIED (HCC): ICD-10-CM

## 2022-05-11 DIAGNOSIS — G40.909 SEIZURE DISORDER (HCC): ICD-10-CM

## 2022-05-11 DIAGNOSIS — F13.90 BENZODIAZEPINE MISUSE: ICD-10-CM

## 2022-05-11 DIAGNOSIS — F11.11 HISTORY OF HEROIN ABUSE (HCC): ICD-10-CM

## 2022-05-11 DIAGNOSIS — N96 HISTORY OF MULTIPLE MISCARRIAGES: ICD-10-CM

## 2022-05-11 DIAGNOSIS — Z30.09 FAMILY PLANNING: ICD-10-CM

## 2022-05-11 PROCEDURE — 99215 OFFICE O/P EST HI 40 MIN: CPT | Performed by: STUDENT IN AN ORGANIZED HEALTH CARE EDUCATION/TRAINING PROGRAM

## 2022-05-11 RX ORDER — LAMOTRIGINE 25 MG/1
25 TABLET ORAL DAILY
Qty: 50 TABLET | Refills: 0 | Status: SHIPPED | OUTPATIENT
Start: 2022-05-11 | End: 2022-06-01

## 2022-05-11 RX ORDER — LAMOTRIGINE 100 MG/1
100 TABLET, EXTENDED RELEASE ORAL DAILY
Qty: 30 TABLET | Refills: 5 | Status: SHIPPED | OUTPATIENT
Start: 2022-05-11 | End: 2022-11-07

## 2022-05-11 ASSESSMENT — FIBROSIS 4 INDEX: FIB4 SCORE: 0.35

## 2022-05-11 NOTE — PATIENT INSTRUCTIONS
Neurology Clinic Visit     IMPORTANT: If imaging, procedure(s), AND/or referrals were placed for you:  Contact [facility] Scheduling if you have not heard from them in 5 day: [phone]    Outpatient [facility] Imaging Sites.  [address]  Mon-Fri 8am-5pm   [address] (Breast Center) Mon-Fri 7am-4pm    [address]  Mon-Fri 8am-5pm  [facility] Radiology 7am-6:30pm  [address] [facility] Mon-Fri 8am-7pm  Sat 9am-6pm   [address]  Mon-Fri 8am-7pm and Sat/Sun 9am-5pm  [address]  Mon-Fri 8am-5pm  [address] (PET/CT)  Mon-Fri 8:30am-4:30pm     If labs were ordered for you:  To Schedule an appointment for lab services, please call [phone] or visit [email or web address].  [facility] Lab Services Convenient Locations:    [place]: [address] (Ground Floor)  [address] [facility] (Admitting Entrance)  [address]  [address]  [address] [facility], [address]  [address] [facility]  [address]: [address]  [address]       [place]: [address] [place] [address] Dr  [address]     [facility]     [address] [place] [address]     Laboratory Hours: 6:30am - 6pm  Monday - Friday,   7am - 2pm Saturday    [facility]      [address] [facility] [place] [address]      Laboratory Hours:  7:30am - 4pm  Monday - Friday,  9am - 3pm Saturday    [facility]     [address]    [place] [address]      Laboratory Hours:  7:00am - 5:30pm  Monday - Friday    [facility]      [address] [facility] [place] [address]       Laboratory Hours:  7:30am - 4:30pm  Monday - Friday    [facility]       [address] [place] [address]       Laboratory Hours:  8am - 5pm  Monday - Friday    [facility]       [address] [place] [id number]       Laboratory hours:  7:30am - 4pm   Monday - Friday    GENERAL REMINDERS:  Request refills 1 week in advance to ensure you do not run out of medications  All diagnostic study results will be reviewed at your next visit, UNLESS there are urgent results that need to be acted on sooner.  Please remember that it is the your responsibility to check with your Insurance for benefit coverage for visit / visits.  24 hours notice is required for all appointment changes or cancellation.  Please arrive 20 min. before your appointment time  Please note that because Dr. Casillas has high daily clinic volume, he is unable to accommodate late arrivals. If you are more than 15 minutes late for the scheduled appointment you will be asked to reschedule  Please bring the following with you:  1) Picture ID  2) Insurance card  3) An updated list of ALL your medications and their dosages (prescribed medications, over the counter medications, and all supplements), as well as allergies with you at all times  4) A list of questions, concerns, comments that you wish to discuss with Dr. Vinny Casillas MD  General Neurologist and Epileptologist  Department of Neurology  Instructor of Clinical Neurology [facility] [employer].

## 2022-06-22 NOTE — TELEPHONE ENCOUNTER
LamoTRIgine (LAMICTAL XR) 100 MG TABLET SR 24 HR 5 refills    Called into [facility] Spoke to Ag.    Original script went [facility] on [address]. Patient did not have transportation to get there. She lives in [facility]. She has been out and having seizures.

## 2022-11-22 ENCOUNTER — TELEPHONE (OUTPATIENT)
Dept: NEUROLOGY | Facility: MEDICAL CENTER | Age: 27
End: 2022-11-22
Payer: MEDICAID

## 2022-11-22 NOTE — TELEPHONE ENCOUNTER
1. Caller Name: Zeina Cantu                        Call Back Number: [phone]      How would the patient prefer to be contacted with a response: Phone call do NOT leave a detailed message    Called patient to advise her that imaging needed to be done prior to appointment. Phone Number that we have on file is disconnected.     Otilio OLSEN, Medical Assistant

## 2022-11-22 NOTE — TELEPHONE ENCOUNTER
Established Patient     [facility] Patient is checked in Patient Demographics? Yes    Is visit type and length correct?  Yes    Is referral attached to visit? Yes    Were records received from referring provider? No, established patient with provider.    Patient was not contacted to have someone accompany them to visit?    Is this appointment scheduled as a Hospital Follow-Up?  No    Does the patient require any pre procedure or post procedure follow up? No    If any orders were placed at last visit or intended to be done for this visit do we have Results/Consult Notes? No  Labs - Labs ordered, NOT completed. Patient advised to complete prior to next appointment.  Note: If patient appointment is for lab review and patient did not complete labs, check with provider if OK to reschedule patient until labs completed.  Imaging - Imaging ordered, NOT completed. Patient advised to complete prior to next appointment.  Referrals - Referral ordered, patient has NOT been seen.        10.  If patient appointment is for Botox - is order pended for provider? No

## 2022-12-09 ENCOUNTER — PHARMACY VISIT (OUTPATIENT)
Dept: PHARMACY | Facility: MEDICAL CENTER | Age: 27
End: 2022-12-09
Payer: COMMERCIAL

## 2022-12-09 ENCOUNTER — HOSPITAL ENCOUNTER (EMERGENCY)
Facility: MEDICAL CENTER | Age: 27
End: 2022-12-09
Attending: EMERGENCY MEDICINE
Payer: MEDICAID

## 2022-12-09 VITALS
BODY MASS INDEX: 22.66 KG/M2 | WEIGHT: 141 LBS | HEIGHT: 66 IN | RESPIRATION RATE: 18 BRPM | OXYGEN SATURATION: 90 % | DIASTOLIC BLOOD PRESSURE: 61 MMHG | HEART RATE: 82 BPM | TEMPERATURE: 97 F | SYSTOLIC BLOOD PRESSURE: 109 MMHG

## 2022-12-09 DIAGNOSIS — G40.919 BREAKTHROUGH SEIZURE (HCC): ICD-10-CM

## 2022-12-09 DIAGNOSIS — R11.10 VOMITING AND DIARRHEA: ICD-10-CM

## 2022-12-09 DIAGNOSIS — R19.7 VOMITING AND DIARRHEA: ICD-10-CM

## 2022-12-09 LAB
APPEARANCE UR: CLEAR
BACTERIA #/AREA URNS HPF: ABNORMAL /HPF
BILIRUB UR QL STRIP.AUTO: NEGATIVE
COLOR UR: ABNORMAL
EPI CELLS #/AREA URNS HPF: ABNORMAL /HPF
GLUCOSE BLD STRIP.AUTO-MCNC: 120 MG/DL (ref 65–99)
GLUCOSE UR STRIP.AUTO-MCNC: NEGATIVE MG/DL
HCG UR QL: NEGATIVE
HYALINE CASTS #/AREA URNS LPF: ABNORMAL /LPF
KETONES UR STRIP.AUTO-MCNC: >=80 MG/DL
LEUKOCYTE ESTERASE UR QL STRIP.AUTO: NEGATIVE
MICRO URNS: ABNORMAL
MUCOUS THREADS #/AREA URNS HPF: ABNORMAL /HPF
NITRITE UR QL STRIP.AUTO: NEGATIVE
PH UR STRIP.AUTO: 6.5 [PH] (ref 5–8)
PROT UR QL STRIP: 30 MG/DL
RBC # URNS HPF: ABNORMAL /HPF
RBC UR QL AUTO: NEGATIVE
SP GR UR STRIP.AUTO: 1.02
UROBILINOGEN UR STRIP.AUTO-MCNC: 1 MG/DL
WBC #/AREA URNS HPF: ABNORMAL /HPF

## 2022-12-09 PROCEDURE — 81025 URINE PREGNANCY TEST: CPT

## 2022-12-09 PROCEDURE — 700102 HCHG RX REV CODE 250 W/ 637 OVERRIDE(OP): Performed by: EMERGENCY MEDICINE

## 2022-12-09 PROCEDURE — 81001 URINALYSIS AUTO W/SCOPE: CPT

## 2022-12-09 PROCEDURE — RXMED WILLOW AMBULATORY MEDICATION CHARGE: Performed by: EMERGENCY MEDICINE

## 2022-12-09 PROCEDURE — 82962 GLUCOSE BLOOD TEST: CPT

## 2022-12-09 PROCEDURE — A9270 NON-COVERED ITEM OR SERVICE: HCPCS | Performed by: EMERGENCY MEDICINE

## 2022-12-09 PROCEDURE — 700111 HCHG RX REV CODE 636 W/ 250 OVERRIDE (IP): Performed by: EMERGENCY MEDICINE

## 2022-12-09 PROCEDURE — 99284 EMERGENCY DEPT VISIT MOD MDM: CPT

## 2022-12-09 RX ORDER — LORAZEPAM 2 MG/1
2 TABLET ORAL ONCE
Status: COMPLETED | OUTPATIENT
Start: 2022-12-09 | End: 2022-12-09

## 2022-12-09 RX ORDER — DIPHENOXYLATE HYDROCHLORIDE AND ATROPINE SULFATE 2.5; .025 MG/1; MG/1
1 TABLET ORAL 4 TIMES DAILY PRN
Qty: 20 TABLET | Refills: 0 | Status: SHIPPED | OUTPATIENT
Start: 2022-12-09 | End: 2022-12-14

## 2022-12-09 RX ORDER — LORAZEPAM 2 MG/1
2 TABLET ORAL EVERY 4 HOURS PRN
Status: DISCONTINUED | OUTPATIENT
Start: 2022-12-09 | End: 2022-12-09

## 2022-12-09 RX ORDER — ONDANSETRON 4 MG/1
4 TABLET, ORALLY DISINTEGRATING ORAL ONCE
Status: COMPLETED | OUTPATIENT
Start: 2022-12-09 | End: 2022-12-09

## 2022-12-09 RX ORDER — NALTREXONE HYDROCHLORIDE 50 MG/1
50 TABLET, FILM COATED ORAL DAILY
Status: SHIPPED | COMMUNITY
End: 2023-09-28

## 2022-12-09 RX ORDER — LORAZEPAM 1 MG/1
1 TABLET ORAL EVERY 8 HOURS PRN
Qty: 10 TABLET | Refills: 0 | Status: SHIPPED | OUTPATIENT
Start: 2022-12-09 | End: 2022-12-13

## 2022-12-09 RX ORDER — BUPROPION HYDROCHLORIDE 100 MG/1
100 TABLET ORAL 2 TIMES DAILY
Status: SHIPPED | COMMUNITY
End: 2023-09-28

## 2022-12-09 RX ORDER — ONDANSETRON 4 MG/1
4 TABLET, ORALLY DISINTEGRATING ORAL EVERY 8 HOURS PRN
Qty: 10 TABLET | Refills: 0 | Status: ON HOLD | OUTPATIENT
Start: 2022-12-09 | End: 2023-09-13 | Stop reason: SDUPTHER

## 2022-12-09 RX ORDER — LORAZEPAM 1 MG/1
1 TABLET ORAL EVERY 8 HOURS PRN
Qty: 10 TABLET | Refills: 0 | Status: SHIPPED | OUTPATIENT
Start: 2022-12-09 | End: 2022-12-09 | Stop reason: SDUPTHER

## 2022-12-09 RX ADMIN — ONDANSETRON 4 MG: 4 TABLET, ORALLY DISINTEGRATING ORAL at 14:21

## 2022-12-09 RX ADMIN — LORAZEPAM 2 MG: 2 TABLET ORAL at 14:24

## 2022-12-09 ASSESSMENT — FIBROSIS 4 INDEX: FIB4 SCORE: 0.27

## 2022-12-09 NOTE — ED PROVIDER NOTES
ED Provider Note    Scribed for Josh Land M.D. by Dameon Russell. 12/9/2022  1:57 PM    Primary care provider: Pcp Pt States None  Means of arrival: EMS  History obtained from: Patient  History limited by: None    CHIEF COMPLAINT  Chief Complaint   Patient presents with    Seizure     Pt reports seizures X 3 today and seizures X 2 Thursday.    Body Aches     /pains.    Flank Pain     Right     HPI  Zeina Davis is a 27 y.o. female who presents to the Emergency Department via EMS for evaluation of seizure like activity. Patient reports that she recently restarted taking naltrexone for opiate use, and she had experienced 2 seizures yesterday, and 3 seizures today. She adds that she drank alcohol two days ago but does not drink daily.  She last used IV opiates 3 years ago. She describes the seizures as losing consciousness, body shakes and waking up minutes later with vomit everywhere. She adds that she may have hurt her right flank during one of these seizures. Patient admits that she has a history of seizures, and takes lamotrigine. At baseline, she reports weekly seizures prior  to this episode. Patient reports associated symptoms of diarrhea, vomiting, burning urination,  and right flank pain. Patient denies cough, fever, blood in urine, or COVID like symptoms. She reports a history of kidney stones, but denies current similar symptoms. Patient describes concern with use of naltrexone and drinking, explaining she heard that it can cause withdrawal when used together. She denies recent stimulant use, history of diabetes mellitus, or current pregnancy. She reports being a smoker. She denies suboxone and methadone use, and reports only taking naltrexone. Hasnt used opiates in ~ 3 years.    REVIEW OF SYSTEMS  Pertinent positives include: seizures, diarrhea, vomiting, burning urination, and right flank pain.  Pertinent negatives include: No cough, fever, blood in urine, or COVID like symptoms.  10+ systems  reviewed and negative.      PAST MEDICAL HISTORY  Past Medical History:   Diagnosis Date    Anxiety     Bacterial vaginosis     Depression     Epilepsy (HCC)     Fx clavicle right    Hepatitis C     Migraine     PID (pelvic inflammatory disease)     Psychiatric disorder     bipoal - adhd    Substance abuse (HCC)        FAMILY HISTORY  Family History   Problem Relation Age of Onset    Bipolar disorder Mother     Depression Mother     Genitourinary () Problems Mother     Heart Attack Mother     Recurrent UTI's Mother     Sexual abuse Mother     Stroke Mother     Alcohol/Drug Father     Anxiety disorder Father     Bipolar disorder Father     Depression Father     Paranoid behavior Father     Psychiatric Illness Father        SOCIAL HISTORY  Social History     Tobacco Use    Smoking status: Every Day     Packs/day: 0.50     Years: 8.00     Pack years: 4.00     Types: Cigarettes    Smokeless tobacco: Never   Vaping Use    Vaping Use: Never used   Substance Use Topics    Alcohol use: Yes    Drug use: Yes     Types: Inhaled     Comment: cocaine, marijuana      Social History     Substance and Sexual Activity   Drug Use Yes    Types: Inhaled    Comment: cocaine, marijuana      SURGICAL HISTORY  Past Surgical History:   Procedure Laterality Date    GYN SURGERY      ovarian cysts    OTHER      toncils, adenoids, appendix    OTHER ABDOMINAL SURGERY      telescoping intestines    TONSILLECTOMY       CURRENT MEDICATIONS  Home Medications       Reviewed by Carlos A Velásquez (Pharmacy Tech) on 12/09/22 at 1648  Med List Status: Complete     Medication Last Dose Status   ARIPiprazole (ABILIFY) 10 MG Tab 12/8/2022 Active   buPROPion (WELLBUTRIN) 100 MG Tab UNK Active   escitalopram (LEXAPRO) 10 MG Tab 12/8/2022 Active   naltrexone (DEPADE) 50 MG Tab 12/8/2022 Active   QUEtiapine (SEROQUEL) 100 MG Tab 12/8/2022 Active                  ALLERGIES  Allergies   Allergen Reactions    Other Drug      PT STATES SHE DOESN'T TOLERATE THE  "SEIZURE MEDS AND HAS TOO MANY SIDE EFFECTS    Latex     Morphine Unspecified     Pt reports cardiac arrest       PHYSICAL EXAM  VITAL SIGNS: /80   Pulse 88   Temp 36.1 °C (96.9 °F) (Temporal)   Resp 18   Ht 1.676 m (5' 6\")   Wt 64 kg (141 lb)   LMP 12/02/2022 (Approximate)   SpO2 95%   BMI 22.76 kg/m²   Reviewed and afebrile and normal  Constitutional: Well developed, Well nourished.  Completely well-appearing  HENT: Normocephalic, atraumatic, bilateral external ears normal, wearing a mask.  Face symmetric, no tongue laceration  Eyes: Pupils @ 5 that are reactive, PERRLA, conjunctiva pink, no scleral icterus.  Stre ocular movements are intact  Cardiovascular: Regular rate and rhythm. No murmurs, rubs or gallops.  No dependent edema or calf tenderness  Respiratory: Lungs clear to auscultation bilaterally. No wheezes, rales, or rhonchi.  Abdominal:  Abdomen soft, non-tender, non distended. No rebound, or guarding.    Skin: No erythema, no rash.   Genitourinary: No costovertebral angle tenderness.   Musculoskeletal: no deformities.   Neurologic: Alert & oriented x 3, cranial nerves 2-12 intact, grasp, biceps, extensor hallucis longus and ankle plantarflexion symmetric.  No focal deficit noted.  Psychiatric: Affect normal, Judgment normal, Mood normal.     DIFFERENTIAL DIAGNOSIS:  Breakthrough seizure, withdrawal, viral syndrome, hypoglycemia, flank contusion, UTI.    LABORATORY:  Results for orders placed or performed during the hospital encounter of 12/09/22   URINALYSIS    Specimen: Urine   Result Value Ref Range    Color Dark Yellow     Character Clear     Specific Gravity 1.025 <1.035    Ph 6.5 5.0 - 8.0    Glucose Negative Negative mg/dL    Ketones >=80 (A) Negative mg/dL    Protein 30 (A) Negative mg/dL    Bilirubin Negative Negative    Urobilinogen, Urine 1.0 Negative    Nitrite Negative Negative    Leukocyte Esterase Negative Negative    Occult Blood Negative Negative    Micro Urine Req " Microscopic    BETA-HCG QUALITATIVE URINE   Result Value Ref Range    Beta-Hcg Urine Negative Negative   URINE MICROSCOPIC (W/UA)   Result Value Ref Range    WBC 0-2 /hpf    RBC 0-2 /hpf    Bacteria Few (A) None /hpf    Epithelial Cells Moderate (A) /hpf    Mucous Threads Many /hpf    Hyaline Cast 0-2 /lpf   POCT glucose device results   Result Value Ref Range    POC Glucose, Blood 120 (H) 65 - 99 mg/dL     Lab results reviewed by me.     INTERVENTIONS:Indications IV Fluid: Hydration  Medications   ondansetron (ZOFRAN ODT) dispertab 4 mg (4 mg Oral Given 12/9/22 1421)   LORazepam (ATIVAN) tablet 2 mg (2 mg Oral Given 12/9/22 1424)     Response: No recurrent seizure.  Tolerated p.o. trial.    ED COURSE:  Nursing notes, VS, PMSFHx reviewed in chart.     1:57 PM - Patient seen and examined at bedside. Patient will be treated with Ativan 2 mg tablet, and Zofran 4mg ODT for her symptoms. Ordered Urinalysis, and Beta-HCG QUAL to evaluate.     2:15 PM - Consulted with pharmacy and updated patient that current symptoms are unlikely to be caused by Naltrexone use.    4:10 PM - Ordered for urine microscopic w/UA for further evaluation.    5:25 PM - Patient was reevaluated at bedside. Discussed lab results with the patient and informed them of my plan for discharge. I plan to prescribe Ativan for seizure control. Patient verbalizes understanding and agreement to this plan of care.     Prescription monitoring queried and score 30  Opiate risk tool utilized and patient low risk  Informed consent obtained for opiate analgesic  Indication benzodiazepine seizure control, and diarrhea      MEDICAL DECISION MAKING:  This patient presents with a vomiting and diarrheal illness and 5 seizures in 2 days.  She has perhaps mildly dehydrated based on urine specific gravity.  Point-of-care glucose was 120.  The patient thinks that the naltrexone because the vomiting and diarrhea but that is unlikely based on the discussion with pharmacy.   She has not had alcohol to excess and has not been using opiates.  It is unlikely she is hyponatremic.  She is a very difficult blood draw and appeared clinically so well but a decision was made not to obtain a full laboratory work-up.  Chart review CBC and CMP were normal a year and a half ago except for elevated platelets.    PLAN:  New Prescriptions    DIPHENOXYLATE-ATROPINE (LOMOTIL) 2.5-0.025 MG TAB    Take 1 Tablet by mouth 4 times a day as needed for Diarrhea for up to 5 days.    LORAZEPAM (ATIVAN) 1 MG TAB    Take 1 Tablet by mouth every 8 hours as needed (take after a seizure to prevent recurrent seizure) for up to 4 days.    ONDANSETRON (ZOFRAN ODT) 4 MG TABLET DISPERSIBLE    Take 1 Tablet by mouth every 8 hours as needed for Nausea/Vomiting.     Seizure handout  Take lorazepam after a seizure to prevent recurrent seizures  Follow-up with your doctor this week if no better  Return for prolonged or recurrent seizures    CONDITION: Stable.      FINAL IMPRESSION  1. Breakthrough seizure (HCC)    2. Vomiting and diarrhea          Dameon GARCIA (Evette), am scribing for, and in the presence of, Josh Land M.D..    Electronically signed by: Dameon Russell (Evette), 12/9/2022    Josh GARCIA M.D. personally performed the services described in this documentation, as scribed by Dameon Russell in my presence, and it is both accurate and complete.    The note accurately reflects work and decisions made by me.  Josh Land M.D.  12/9/2022  7:32 PM

## 2022-12-09 NOTE — ED TRIAGE NOTES
".  Chief Complaint   Patient presents with    Seizure     Pt reports seizures X 3 today and seizures X 2 Thursday.    Body Aches     /pains.    Flank Pain     Right   Pt BIB EMS from home.  Pt telephoned medics.  Pt reports having an aura prior to seizures.  Pt reports that she stopped taking Naltrexone approximately 3 months ago with no seizure activity.  Pt took Naltrexone Thursday, \" had seizures again. \"  No incontinence.  No oral trauma.  Pt also reports no heroine use for 3 years, \" it feels like I'm kickin' heroine. \"  FSBS .     "

## 2022-12-10 NOTE — DISCHARGE INSTRUCTIONS
Seizures     Return for back to back seizures or seizure longer than 10 minutes.  Return for ill appearance, fever or injury.  Followup with neurology.  Do not drive for 3 months until cleared by neurology.  During a seizure ease the patient to the floor, move objects away from her to prevent injury, put nothing in her mouth. .  Take a dose of lorazepam any day during which she will have a seizure to prevent further seizures.  Continue your current seizure medicine.  Take Zofran and Lomotil for vomiting and diarrhea.  Follow-up with your doctor this week        You had a seizure.  About 2% of the population will have a seizure problem during their life.  Sometimes the cause for the seizure is not known.  Seizures are most often associated with one of these problems:  Ø Epilepsy.  Ø Not taking your seizure medicine.  Ø Alcohol and drug abuse.  Ø Head injury, strokes, tumors, and brain surgery.  Ø High fever and infections.  Ø Low blood sugar.     Evaluating a new seizure disorder may require having a brain scan or an EEG (brain wave test). If you have been given a seizure medicine, it is very important that you take it as prescribed.  Not taking these medicines as directed is the most common cause of seizures. Blood tests are often used to be sure you are taking the proper dose.      Seizures cause many different symptoms, from convulsions to brief blackouts. Please do not ride a bike, drive a car, go swimming, climb in high or dangerous places such as ladders or roofs, or operate any dangerous equipment until you have your doctor's permission.  If you hold a 's license, state law may require that a report be made to the motor vehicles department.  You should wear an emergency medical identification bracelet with information about your seizures. If you have any warning that a seizure may occur, lie down in a safe place to protect yourself.     Teach your family and friends what to do if you have any further  seizures. They should stay calm and try to keep you from falling on hard or sharp objects. It is best not to try to restrain a seizing person or to force anything into their mouth. Do not try to open clenched jaws.  When the seizure is over, the person should be rolled on their side to help drain any vomit or secretions from the mouth.  After a seizure the person may be confused or drowsy for several minutes and they can be reassured. An ambulance should be called if the seizure lasted more than 5 minutes or if confusion remains for more than 30 minutes.  Call your caregiver or the emergency department for further instructions.     Do not drive until cleared by your caregiver or neurologist!     Document Released: 01/25/2006  Document Re-Released: 10/14/2008  [employer]® Patient Information ©2009 [employer].

## 2022-12-10 NOTE — ED NOTES
Pt resting on gurney.  No needs at this time.  Bed in low position.  Side rails up X 2.  No seizure activity noted.

## 2022-12-10 NOTE — ED NOTES
Med Rec complete per patient/patient's home pharmacy  No oral antibiotics in the last 30 days per  Allergies reviewed

## 2023-01-27 ENCOUNTER — HOSPITAL ENCOUNTER (EMERGENCY)
Facility: MEDICAL CENTER | Age: 28
End: 2023-01-27
Attending: EMERGENCY MEDICINE
Payer: MEDICAID

## 2023-01-27 VITALS
RESPIRATION RATE: 16 BRPM | HEIGHT: 66 IN | TEMPERATURE: 97.8 F | SYSTOLIC BLOOD PRESSURE: 100 MMHG | DIASTOLIC BLOOD PRESSURE: 50 MMHG | BODY MASS INDEX: 24.87 KG/M2 | HEART RATE: 80 BPM | WEIGHT: 154.76 LBS | OXYGEN SATURATION: 95 %

## 2023-01-27 DIAGNOSIS — N39.0 ACUTE UTI: ICD-10-CM

## 2023-01-27 LAB
APPEARANCE UR: ABNORMAL
BACTERIA #/AREA URNS HPF: ABNORMAL /HPF
BILIRUB UR QL STRIP.AUTO: ABNORMAL
COLOR UR: ABNORMAL
EPI CELLS #/AREA URNS HPF: ABNORMAL /HPF
GLUCOSE UR STRIP.AUTO-MCNC: NEGATIVE MG/DL
HCG UR QL: NEGATIVE
HYALINE CASTS #/AREA URNS LPF: ABNORMAL /LPF
KETONES UR STRIP.AUTO-MCNC: ABNORMAL MG/DL
LEUKOCYTE ESTERASE UR QL STRIP.AUTO: ABNORMAL
MICRO URNS: ABNORMAL
NITRITE UR QL STRIP.AUTO: POSITIVE
PH UR STRIP.AUTO: 5.5 [PH] (ref 5–8)
PROT UR QL STRIP: 300 MG/DL
RBC # URNS HPF: ABNORMAL /HPF
RBC UR QL AUTO: ABNORMAL
SP GR UR STRIP.AUTO: 1.04
UROBILINOGEN UR STRIP.AUTO-MCNC: 1 MG/DL
WBC #/AREA URNS HPF: ABNORMAL /HPF

## 2023-01-27 PROCEDURE — 87086 URINE CULTURE/COLONY COUNT: CPT

## 2023-01-27 PROCEDURE — 81025 URINE PREGNANCY TEST: CPT

## 2023-01-27 PROCEDURE — 81001 URINALYSIS AUTO W/SCOPE: CPT

## 2023-01-27 PROCEDURE — 87186 SC STD MICRODIL/AGAR DIL: CPT

## 2023-01-27 PROCEDURE — A9270 NON-COVERED ITEM OR SERVICE: HCPCS | Performed by: EMERGENCY MEDICINE

## 2023-01-27 PROCEDURE — 87077 CULTURE AEROBIC IDENTIFY: CPT | Mod: 91

## 2023-01-27 PROCEDURE — 700102 HCHG RX REV CODE 250 W/ 637 OVERRIDE(OP): Performed by: EMERGENCY MEDICINE

## 2023-01-27 PROCEDURE — 99283 EMERGENCY DEPT VISIT LOW MDM: CPT

## 2023-01-27 RX ORDER — NITROFURANTOIN 25; 75 MG/1; MG/1
100 CAPSULE ORAL 2 TIMES DAILY
Qty: 10 CAPSULE | Refills: 0 | Status: ACTIVE | OUTPATIENT
Start: 2023-01-27 | End: 2023-02-01

## 2023-01-27 RX ORDER — NITROFURANTOIN 25; 75 MG/1; MG/1
100 CAPSULE ORAL ONCE
Status: COMPLETED | OUTPATIENT
Start: 2023-01-27 | End: 2023-01-27

## 2023-01-27 RX ORDER — PHENAZOPYRIDINE HYDROCHLORIDE 200 MG/1
200 TABLET, FILM COATED ORAL 3 TIMES DAILY PRN
Qty: 6 TABLET | Refills: 0 | Status: ON HOLD | OUTPATIENT
Start: 2023-01-27 | End: 2023-09-12

## 2023-01-27 RX ORDER — PHENAZOPYRIDINE HYDROCHLORIDE 100 MG/1
100 TABLET, FILM COATED ORAL ONCE
Status: COMPLETED | OUTPATIENT
Start: 2023-01-27 | End: 2023-01-27

## 2023-01-27 RX ADMIN — PHENAZOPYRIDINE 100 MG: 100 TABLET ORAL at 18:30

## 2023-01-27 RX ADMIN — NITROFURANTOIN MONOHYDRATE/MACROCRYSTALLINE 100 MG: 25; 75 CAPSULE ORAL at 18:30

## 2023-01-27 ASSESSMENT — FIBROSIS 4 INDEX: FIB4 SCORE: 0.27

## 2023-01-27 NOTE — ED TRIAGE NOTES
"Chief Complaint   Patient presents with    Urinary Frequency     Pt states she has had urinary frequency starting 2 hours ago. Pt states some incontinence.     Urinary Pain     Pt c/o pain with urination. Pt states is feels like a UTI.     BP 95/46   Pulse 85   Temp 36.2 °C (97.2 °F) (Temporal)   Resp 18   Ht 1.676 m (5' 6\")   Wt 70.2 kg (154 lb 12.2 oz)   SpO2 97%   BMI 24.98 kg/m²     Pt ambulatory to triage with steady gait. Dysuria protocol ordered. Pt denies concerns about STIs.     Pt is alert and oriented, speaking in full sentences, follows commands and responds appropriately to questions. Resp are even and unlabored.      Pt placed in lobby. Pt educated on triage process. Pt encouraged to alert staff for any changes.     Patient and staff wearing appropriate PPE    "

## 2023-01-28 NOTE — ED NOTES
All discharge instructions given to pt.   Pt verbalized understanding of all discharge instructions. All lines removed prior to discharge. All questions answered.

## 2023-01-28 NOTE — ED PROVIDER NOTES
ED Provider Note    CHIEF COMPLAINT  Chief Complaint   Patient presents with    Urinary Frequency     Pt states she has had urinary frequency starting 2 hours ago. Pt states some incontinence.     Urinary Pain     Pt c/o pain with urination. Pt states is feels like a UTI.       EXTERNAL RECORDS REVIEWED  Outpatient Notes emergency department visit for seizure and flank pain    HPI/ROS  LIMITATION TO HISTORY   Select: : None  OUTSIDE HISTORIAN(S):  none    Zeina Davis is a 27 y.o. female who presents with urinary frequency and dysuria.  Patient reports that a few hours ago while at work she began having urgency, multiple trips to the bathroom and some burning with urination.  She reports no other abdominal or flank pain.  No fevers or chills.  No nausea or vomiting.  States she is not pregnant    PAST MEDICAL HISTORY   has a past medical history of Anxiety, Bacterial vaginosis, Depression, Epilepsy (HCC), Fx clavicle (right), Hepatitis C, Migraine, PID (pelvic inflammatory disease), Psychiatric disorder, and Substance abuse ([facility]).    SURGICAL HISTORY   has a past surgical history that includes other; other abdominal surgery; gyn surgery; and tonsillectomy.    FAMILY HISTORY  Family History   Problem Relation Age of Onset    Bipolar disorder Mother     Depression Mother     Genitourinary () Problems Mother     Heart Attack Mother     Recurrent UTI's Mother     Sexual abuse Mother     Stroke Mother     Alcohol/Drug Father     Anxiety disorder Father     Bipolar disorder Father     Depression Father     Paranoid behavior Father     Psychiatric Illness Father        SOCIAL HISTORY  Social History     Tobacco Use    Smoking status: Every Day     Packs/day: 0.50     Years: 8.00     Pack years: 4.00     Types: Cigarettes    Smokeless tobacco: Never   Vaping Use    Vaping Use: Never used   Substance and Sexual Activity    Alcohol use: Yes     Comment: occasional    Drug use: Yes     Types: Inhaled     Comment:  "marijuana    Sexual activity: Not on file       CURRENT MEDICATIONS  Home Medications       Reviewed by Yady Gutierrez R.N. (Registered Nurse) on 01/27/23 at 1506  Med List Status: Not Addressed     Medication Last Dose Status   ARIPiprazole (ABILIFY) 10 MG Tab  Active   buPROPion (WELLBUTRIN) 100 MG Tab  Active   escitalopram (LEXAPRO) 10 MG Tab  Active   naltrexone (DEPADE) 50 MG Tab  Active   ondansetron (ZOFRAN ODT) 4 MG TABLET DISPERSIBLE  Active   QUEtiapine (SEROQUEL) 100 MG Tab  Active                    ALLERGIES  Allergies   Allergen Reactions    Other Drug      PT STATES SHE DOESN'T TOLERATE THE SEIZURE MEDS AND HAS TOO MANY SIDE EFFECTS    Latex     Morphine Unspecified     Pt reports cardiac arrest       PHYSICAL EXAM  VITAL SIGNS: BP 95/46   Pulse 85   Temp 36.2 °C (97.2 °F) (Temporal)   Resp 18   Ht 1.676 m (5' 6\")   Wt 70.2 kg (154 lb 12.2 oz)   SpO2 97%   BMI 24.98 kg/m²      Pulse ox interpretation: I interpret this pulse ox as normal.  Constitutional: Alert in no apparent distress.  HENT: No signs of trauma, Bilateral external ears normal, Nose normal.   Eyes: Pupils are equal and reactive, Conjunctiva normal, Non-icteric.   Neck: Normal range of motion, No tenderness, Supple, No stridor.   Cardiovascular: Regular rate and rhythm, no murmurs.   Thorax & Lungs: Normal breath sounds, No respiratory distress, No wheezing, No chest tenderness.   Abdomen: Bowel sounds normal, Soft, No tenderness, No masses, No pulsatile masses. No peritoneal signs.  Skin: Warm, Dry, No erythema, No rash.   Back: No bony tenderness, No CVA tenderness.   Extremities: Intact distal pulses, No edema, No tenderness, No cyanosis,  Negative Angel Luis's sign.   Musculoskeletal: Good range of motion in all major joints. No tenderness to palpation or major deformities noted.   Neurologic: Alert , Normal motor function, Normal sensory function, No focal deficits noted.   Psychiatric: Affect normal, Judgment normal, Mood " normal.               DIAGNOSTIC STUDIES / PROCEDURES    LABS  Labs Reviewed   URINALYSIS,CULTURE IF INDICATED - Abnormal; Notable for the following components:       Result Value    Color Orange (*)     Character Turbid (*)     Ketones Trace (*)     Protein 300 (*)     Bilirubin Small (*)     Nitrite Positive (*)     Leukocyte Esterase Moderate (*)     Occult Blood Large (*)     All other components within normal limits    Narrative:     Indication for culture:->Patient WITHOUT an indwelling Espino  catheter in place with new onset of Dysuria, Frequency,  Urgency, and/or Suprapubic pain   URINE MICROSCOPIC (W/UA) - Abnormal; Notable for the following components:    WBC  (*)     -150 (*)     Bacteria Moderate (*)     Hyaline Cast 3-5 (*)     All other components within normal limits    Narrative:     Indication for culture:->Patient WITHOUT an indwelling Espino  catheter in place with new onset of Dysuria, Frequency,  Urgency, and/or Suprapubic pain   HCG QUALITATIVE UR    Narrative:     Indication for culture:->Patient WITHOUT an indwelling Espino  catheter in place with new onset of Dysuria, Frequency,  Urgency, and/or Suprapubic pain   URINE CULTURE(NEW)    Narrative:     Indication for culture:->Patient WITHOUT an indwelling Espino  catheter in place with new onset of Dysuria, Frequency,  Urgency, and/or Suprapubic pain           COURSE & MEDICAL DECISION MAKING        INITIAL ASSESSMENT, COURSE AND PLAN  Care Narrative: 4:33 PM  Patient evaluated bedside, at this point differential includes urinary tract infection, cystitis, pyelonephritis although this seems less likely she has had no fevers, short duration of symptoms and nature of her symptoms, pelvic infection which also seems less likely given her lack of other pelvic symptoms  Plan for urinalysis, urine pregnancy    6:07 PM  discussed results and plan for discharge to which the patient is agreeable          ADDITIONAL PROBLEM LIST  #1  UTI  Patient with both symptoms as well as diagnostics are consistent with urinary tract infection.  No fevers or other findings suggestive of upper UTI pyelonephritis.  No findings suggestive of surgical intra-abdominal pathology or concomitant ureteral stone.  She will be started on Macrobid per protocol and a dose here, as well as Pyridium for symptoms,      DISPOSITION AND DISCUSSIONS    Barriers to care at this time, including but not limited to: Patient does not have established PCP.  Resources for follow-up through the [facility]    Decision tools and prescription drugs considered including, but not limited to: Antibiotics Macrobid .    FINAL DIAGNOSIS  1. Acute UTI           Electronically signed by: Dameon Raines M.D., 1/27/2023 4:32 PM

## 2023-01-29 LAB
BACTERIA UR CULT: ABNORMAL
SIGNIFICANT IND 70042: ABNORMAL
SITE SITE: ABNORMAL
SOURCE SOURCE: ABNORMAL

## 2023-05-15 ENCOUNTER — HOSPITAL ENCOUNTER (EMERGENCY)
Facility: MEDICAL CENTER | Age: 28
End: 2023-05-15
Payer: MEDICAID

## 2023-05-15 ENCOUNTER — TELEPHONE (OUTPATIENT)
Dept: NEUROLOGY | Facility: MEDICAL CENTER | Age: 28
End: 2023-05-15
Payer: MEDICAID

## 2023-05-15 VITALS
HEART RATE: 76 BPM | WEIGHT: 158.07 LBS | SYSTOLIC BLOOD PRESSURE: 117 MMHG | HEIGHT: 66 IN | BODY MASS INDEX: 25.4 KG/M2 | TEMPERATURE: 98.3 F | OXYGEN SATURATION: 96 % | DIASTOLIC BLOOD PRESSURE: 91 MMHG | RESPIRATION RATE: 18 BRPM

## 2023-05-15 PROCEDURE — 302449 STATCHG TRIAGE ONLY (STATISTIC)

## 2023-05-15 NOTE — ED TRIAGE NOTES
Chief Complaint   Patient presents with    Seizure     Patient reports seizure x2 today. Patient denies head strike, oral trauma, or loss of bladder. Patient reports out of medications x 2 days.

## 2023-05-15 NOTE — ED NOTES
Pt to triage desk stating they would like to leave. Risks explained to pt. Pt signed LWBS and dismissed per EPIC.

## 2023-05-15 NOTE — TELEPHONE ENCOUNTER
Pt came in stating she's been calling for months trying to get in contact with Dr. Casillas. Her prescription lamotrigine isn't working [id number] and has been having constant seizures. She just went to the emergency today. She is asking for an emergency appt if possible or different meds to try. Pt would like a call back ASAP.

## 2023-05-25 ENCOUNTER — APPOINTMENT (OUTPATIENT)
Dept: RADIOLOGY | Facility: MEDICAL CENTER | Age: 28
End: 2023-05-25
Attending: EMERGENCY MEDICINE
Payer: MEDICAID

## 2023-05-25 ENCOUNTER — HOSPITAL ENCOUNTER (EMERGENCY)
Facility: MEDICAL CENTER | Age: 28
End: 2023-05-25
Attending: EMERGENCY MEDICINE
Payer: MEDICAID

## 2023-05-25 VITALS
SYSTOLIC BLOOD PRESSURE: 120 MMHG | BODY MASS INDEX: 25.55 KG/M2 | OXYGEN SATURATION: 99 % | HEART RATE: 77 BPM | WEIGHT: 158.29 LBS | TEMPERATURE: 98.9 F | RESPIRATION RATE: 17 BRPM | DIASTOLIC BLOOD PRESSURE: 81 MMHG

## 2023-05-25 DIAGNOSIS — R56.9 SEIZURE-LIKE ACTIVITY (HCC): ICD-10-CM

## 2023-05-25 DIAGNOSIS — O41.8X10 SUBCHORIONIC HEMATOMA IN FIRST TRIMESTER, SINGLE OR UNSPECIFIED FETUS: ICD-10-CM

## 2023-05-25 DIAGNOSIS — O46.8X1 SUBCHORIONIC HEMATOMA IN FIRST TRIMESTER, SINGLE OR UNSPECIFIED FETUS: ICD-10-CM

## 2023-05-25 DIAGNOSIS — Z3A.01 LESS THAN 8 WEEKS GESTATION OF PREGNANCY: ICD-10-CM

## 2023-05-25 LAB
ALBUMIN SERPL BCP-MCNC: 4.4 G/DL (ref 3.2–4.9)
ALBUMIN/GLOB SERPL: 1.5 G/DL
ALP SERPL-CCNC: 41 U/L (ref 30–99)
ALT SERPL-CCNC: 16 U/L (ref 2–50)
ANION GAP SERPL CALC-SCNC: 13 MMOL/L (ref 7–16)
APPEARANCE UR: CLEAR
AST SERPL-CCNC: 15 U/L (ref 12–45)
B-HCG SERPL-ACNC: 3158 MIU/ML (ref 0–5)
BASOPHILS # BLD AUTO: 0.2 % (ref 0–1.8)
BASOPHILS # BLD: 0.01 K/UL (ref 0–0.12)
BILIRUB SERPL-MCNC: 0.5 MG/DL (ref 0.1–1.5)
BILIRUB UR QL STRIP.AUTO: NEGATIVE
BUN SERPL-MCNC: 14 MG/DL (ref 8–22)
CALCIUM ALBUM COR SERPL-MCNC: 8.9 MG/DL (ref 8.5–10.5)
CALCIUM SERPL-MCNC: 9.2 MG/DL (ref 8.5–10.5)
CHLORIDE SERPL-SCNC: 105 MMOL/L (ref 96–112)
CO2 SERPL-SCNC: 21 MMOL/L (ref 20–33)
COLOR UR: YELLOW
CREAT SERPL-MCNC: 0.55 MG/DL (ref 0.5–1.4)
EKG IMPRESSION: NORMAL
EOSINOPHIL # BLD AUTO: 0.06 K/UL (ref 0–0.51)
EOSINOPHIL NFR BLD: 0.9 % (ref 0–6.9)
ERYTHROCYTE [DISTWIDTH] IN BLOOD BY AUTOMATED COUNT: 40.7 FL (ref 35.9–50)
GFR SERPLBLD CREATININE-BSD FMLA CKD-EPI: 128 ML/MIN/1.73 M 2
GLOBULIN SER CALC-MCNC: 3 G/DL (ref 1.9–3.5)
GLUCOSE SERPL-MCNC: 88 MG/DL (ref 65–99)
GLUCOSE UR STRIP.AUTO-MCNC: NEGATIVE MG/DL
HCG SERPL QL: POSITIVE
HCT VFR BLD AUTO: 37.9 % (ref 37–47)
HGB BLD-MCNC: 13 G/DL (ref 12–16)
IMM GRANULOCYTES # BLD AUTO: 0.01 K/UL (ref 0–0.11)
IMM GRANULOCYTES NFR BLD AUTO: 0.2 % (ref 0–0.9)
KETONES UR STRIP.AUTO-MCNC: NEGATIVE MG/DL
LEUKOCYTE ESTERASE UR QL STRIP.AUTO: NEGATIVE
LYMPHOCYTES # BLD AUTO: 2.39 K/UL (ref 1–4.8)
LYMPHOCYTES NFR BLD: 36 % (ref 22–41)
MCH RBC QN AUTO: 30.9 PG (ref 27–33)
MCHC RBC AUTO-ENTMCNC: 34.3 G/DL (ref 32.2–35.5)
MCV RBC AUTO: 90 FL (ref 81.4–97.8)
MICRO URNS: NORMAL
MONOCYTES # BLD AUTO: 0.35 K/UL (ref 0–0.85)
MONOCYTES NFR BLD AUTO: 5.3 % (ref 0–13.4)
NEUTROPHILS # BLD AUTO: 3.82 K/UL (ref 1.82–7.42)
NEUTROPHILS NFR BLD: 57.4 % (ref 44–72)
NITRITE UR QL STRIP.AUTO: NEGATIVE
NRBC # BLD AUTO: 0 K/UL
NRBC BLD-RTO: 0 /100 WBC (ref 0–0.2)
PH UR STRIP.AUTO: 5.5 [PH] (ref 5–8)
PLATELET # BLD AUTO: 273 K/UL (ref 164–446)
PMV BLD AUTO: 9.1 FL (ref 9–12.9)
POTASSIUM SERPL-SCNC: 3.7 MMOL/L (ref 3.6–5.5)
PROT SERPL-MCNC: 7.4 G/DL (ref 6–8.2)
PROT UR QL STRIP: NEGATIVE MG/DL
RBC # BLD AUTO: 4.21 M/UL (ref 4.2–5.4)
RBC UR QL AUTO: NEGATIVE
SODIUM SERPL-SCNC: 139 MMOL/L (ref 135–145)
SP GR UR STRIP.AUTO: 1.03
UROBILINOGEN UR STRIP.AUTO-MCNC: 0.2 MG/DL
WBC # BLD AUTO: 6.6 K/UL (ref 4.8–10.8)

## 2023-05-25 PROCEDURE — 93005 ELECTROCARDIOGRAM TRACING: CPT

## 2023-05-25 PROCEDURE — 85025 COMPLETE CBC W/AUTO DIFF WBC: CPT

## 2023-05-25 PROCEDURE — 93005 ELECTROCARDIOGRAM TRACING: CPT | Performed by: EMERGENCY MEDICINE

## 2023-05-25 PROCEDURE — 81003 URINALYSIS AUTO W/O SCOPE: CPT

## 2023-05-25 PROCEDURE — 36415 COLL VENOUS BLD VENIPUNCTURE: CPT

## 2023-05-25 PROCEDURE — 84703 CHORIONIC GONADOTROPIN ASSAY: CPT

## 2023-05-25 PROCEDURE — 99284 EMERGENCY DEPT VISIT MOD MDM: CPT

## 2023-05-25 PROCEDURE — 76801 OB US < 14 WKS SINGLE FETUS: CPT

## 2023-05-25 PROCEDURE — 84702 CHORIONIC GONADOTROPIN TEST: CPT

## 2023-05-25 PROCEDURE — A9270 NON-COVERED ITEM OR SERVICE: HCPCS | Mod: UD | Performed by: EMERGENCY MEDICINE

## 2023-05-25 PROCEDURE — 700102 HCHG RX REV CODE 250 W/ 637 OVERRIDE(OP): Mod: UD | Performed by: EMERGENCY MEDICINE

## 2023-05-25 PROCEDURE — 80053 COMPREHEN METABOLIC PANEL: CPT

## 2023-05-25 RX ORDER — LEVETIRACETAM 500 MG/1
500 TABLET ORAL 2 TIMES DAILY
Qty: 60 TABLET | Refills: 0 | Status: SHIPPED | OUTPATIENT
Start: 2023-05-25 | End: 2023-05-26

## 2023-05-25 RX ORDER — LEVETIRACETAM 500 MG/1
1500 TABLET ORAL ONCE
Status: COMPLETED | OUTPATIENT
Start: 2023-05-25 | End: 2023-05-25

## 2023-05-25 RX ORDER — LEVETIRACETAM 500 MG/5ML
1500 INJECTION, SOLUTION, CONCENTRATE INTRAVENOUS ONCE
Status: DISCONTINUED | OUTPATIENT
Start: 2023-05-25 | End: 2023-05-25

## 2023-05-25 RX ADMIN — LEVETIRACETAM 1500 MG: 500 TABLET, FILM COATED ORAL at 15:17

## 2023-05-25 NOTE — ED NOTES
..Pt verbalizes understanding of dc instructions provided. Pt states that all questions have been answered and they feel comfortable with discharge instructions provided. Pt has dc paperwork in hand. Pt has all belongings in position. Pt to lobby w/o incident.

## 2023-05-25 NOTE — ED TRIAGE NOTES
"Chief Complaint   Patient presents with    Pregnancy    Seizure     Pt reports Hx SZ, took pregnancy test today and right after she got a + result she had a SZ. Pt concerned that she could have \"lost the baby.\"  Pt denies vaginal bleeding.   Pt reports increase in SZ frequency over the last few weeks.    /84   Pulse 77   Temp 37.6 °C (99.7 °F) (Temporal)   Resp (!) 24   Wt 71.8 kg (158 lb 4.6 oz)   SpO2 98%   Pt informed of wait times. Educated on triage process.  Asked to return to triage RN for any new or worsening of symptoms. Thanked for patience.      "

## 2023-05-25 NOTE — ED PROVIDER NOTES
ED Provider Note    Scribed for Becca Newton M.D. by Ena Husain. 5/25/2023, 1:25 PM.    Primary care provider: Pcp Pt States None  Means of arrival: Walk-In  History obtained from: Patient  History limited by: None    CHIEF COMPLAINT  Chief Complaint   Patient presents with    Pregnancy    Seizure       HPI/ROS  Zeina Davis is a 27 y.o. female with a history of seizures, who presents to the Emergency Department for a seizure onset prior to arrival. The patient describes that today she took a pregnancy test, and the result was positive. Shortly after this she had a seizure. She is concerned they she may have lost her pregnancy because of this. The patient denies any bleeding, discharge, or abdominal pain. She also notes that she has been having an increase in the amount of seizures she has over the past few weeks.  She stopped taking her Lamictal few weeks ago because she felt it was not working.  She reports that she has been having 2-3 generalized tonic-clonic seizures a day which lasted around 30 seconds.  The patient notes that she has an appointment with her neurologist on June 6th with Dr. Casillas.      Her last menstrual period was April 19th, and notes her period has been late for 5 days.  She reports a history of 20 prior pregnancies with only 1 live birth.  There are no known alleviating or exacerbating factors.      EXTERNAL RECORDS REVIEWED  Patient last seen at [facility] emergency department for seizure activity in February 2023, she had CT of the head which demonstrated no acute abnormalities    LIMITATION TO HISTORY   Select: : None    OUTSIDE HISTORIAN(S):  Friend Work boss provided length of seizures.      PAST MEDICAL HISTORY   has a past medical history of Anxiety, Bacterial vaginosis, Depression, Epilepsy (HCC), Fx clavicle (right), Hepatitis C, Migraine, PID (pelvic inflammatory disease), Psychiatric disorder, and Substance abuse (HCC).    SURGICAL HISTORY   has a past surgical  history that includes other; other abdominal surgery; gyn surgery; and tonsillectomy.    SOCIAL HISTORY  Social History     Tobacco Use    Smoking status: Every Day     Packs/day: 0.50     Years: 8.00     Pack years: 4.00     Types: Cigarettes    Smokeless tobacco: Never   Vaping Use    Vaping Use: Every day    Substances: Nicotine, Flavoring   Substance Use Topics    Alcohol use: Not Currently     Comment: occasional    Drug use: Not Currently     Types: Inhaled     Comment: marijuana      Social History     Substance and Sexual Activity   Drug Use Not Currently    Types: Inhaled    Comment: marijuana       FAMILY HISTORY  Family History   Problem Relation Age of Onset    Bipolar disorder Mother     Depression Mother     Genitourinary () Problems Mother     Heart Attack Mother     Recurrent UTI's Mother     Sexual abuse Mother     Stroke Mother     Alcohol/Drug Father     Anxiety disorder Father     Bipolar disorder Father     Depression Father     Paranoid behavior Father     Psychiatric Illness Father        CURRENT MEDICATIONS  Home Medications       Reviewed by Joceline Calvin R.N. (Registered Nurse) on 05/25/23 at 1304  Med List Status: Partial     Medication Last Dose Status   ARIPiprazole (ABILIFY) 10 MG Tab  Active   buPROPion (WELLBUTRIN) 100 MG Tab  Active   escitalopram (LEXAPRO) 10 MG Tab  Active   naltrexone (DEPADE) 50 MG Tab  Active   ondansetron (ZOFRAN ODT) 4 MG TABLET DISPERSIBLE  Active   phenazopyridine (PYRIDIUM) 200 MG Tab  Active   QUEtiapine (SEROQUEL) 100 MG Tab  Active                    ALLERGIES  Allergies   Allergen Reactions    Other Drug      PT STATES SHE DOESN'T TOLERATE THE SEIZURE MEDS AND HAS TOO MANY SIDE EFFECTS    Latex     Morphine Unspecified     Pt reports cardiac arrest       PHYSICAL EXAM  VITAL SIGNS: /84   Pulse 77   Temp 37.6 °C (99.7 °F) (Temporal)   Resp (!) 24   Wt 71.8 kg (158 lb 4.6 oz)   LMP 04/17/2023 (Approximate)   SpO2 98%   BMI 25.55  kg/m²   Vitals reviewed by myself.  Physical Exam   Nursing note and vitals reviewed.  Constitutional: Well-developed and well-nourished. No acute distress.   HENT: Head is normocephalic and atraumatic.  Eyes: extra-ocular movements intact  Cardiovascular: Regular rate and regular rhythm. No murmur heard.  Pulmonary/Chest: Breath sounds normal. No wheezes or rales.   Abdominal: Soft and non-tender. No distention.    Musculoskeletal: Extremities exhibit normal range of motion without edema or tenderness.   Neurological: Awake and alert, cranial nerves II through XII intact, no focal neurologic deficits  Skin: Skin is warm and dry. No rash.     DIAGNOSTIC STUDIES:  LABS  Labs Reviewed   HCG QUAL SERUM - Abnormal; Notable for the following components:       Result Value    Beta-Hcg Qualitative Serum Positive (*)     All other components within normal limits   HCG QUANTITATIVE - Abnormal; Notable for the following components:    Bhcg 3158.0 (*)     All other components within normal limits   CBC WITH DIFFERENTIAL   COMP METABOLIC PANEL   URINALYSIS,CULTURE IF INDICATED    Narrative:     Indication for culture:->Unexplained new onset of Flank pain  and/or Costovertebral angle tenderness   CORRECTED CALCIUM   ESTIMATED GFR       All labs reviewed and independently interpreted by myself    EKG Interpretation:    12 Lead EKG independently interpreted by myself to show:  EKG at 1:01 PM: Normal sinus rhythm, heart rate 73, normal axis, normal intervals, , QRS 87, QTc 422, no acute ST-T segment changes, no evidence of acute arrhythmia or ischemia per my interpretation    RADIOLOGY  Final interpretation by radiology demonstrates:    US-OB 1ST TRIMESTER WITH TRANSVAGINAL (COMBO)   Final Result      1.  Probable early pregnancy at 5 weeks, 1 days estimated gestational age. Pseudogestational sac of ectopic pregnancy could also have this appearance. Close clinical and sonographic follow-up is recommended.   2.  Small  subchorionic hemorrhage        The radiologist's interpretation of all radiological studies have been reviewed by me.    COURSE & MEDICAL DECISION MAKING    ED Observation Status? No    INITIAL ASSESSMENT AND PLAN    Patient is a 27-year-old female who presents for evaluation of seizure in pregnancy.  Differential diagnosis includes seizure disorder, electrolyte disturbance, dehydration, pregnancy, arrhythmia.  Diagnostic work-up includes labs, EKG and pelvic ultrasound.     ER COURSE AND FINAL DISPOSITION   Patient's initial vitals are within normal limits, she is neurologically intact on exam.  EKG returns and demonstrates no evidence of arrhythmia.  Labs returned and are independently interpreted by myself to demonstrate:  -CBC is within normal limits, no leukocytosis, no anemia  -Electrolytes and renal function are within normal limits  -hCG is positive with a beta quant of 3158    I spoke with patient's neurologist Dr. Casillas who will follow up with her tomorrow for close monitoring of seizures in pregnancy.  In the meantime he recommends loading her with Keppra and starting her on 500 mg twice daily.  Patient is amenable to this plan.  She has been monitored in the ER and has had no recurrent seizures.    Ultrasound returns and demonstrates probable early pregnancy at 5 weeks and 1 day, there is a small subchorionic hemorrhage.  Per record review patient's blood type is A+ and therefore RhoGAM is not indicated.  I will have her follow-up with the women's health clinic for ongoing management of her pregnancy.  She is then given strict return precautions and discharged in stable condition.    ADDITIONAL PROBLEM LIST AND RESOURCE UTILIZATION    Additional problems aside from the chief complaint that I have addressed: none    I have discussed management of the patient with the following physicians and YOLI's: Dr. Casillas (Neurology)    Discussion of management with other \[facility]\"" or appropriate  source(s):none    Escalation of care considered, and ultimately not performed: See above.     Barriers to care at this time, including but not limited to: Patient does not have established PCP.     Decision tools and prescription drugs considered including, but not limited to:  See above .    Please see review of records as noted above      FINAL IMPRESSION  1. Seizure-like activity (HCC)    2. Less than 8 weeks gestation of pregnancy    3. Subchorionic hematoma in first trimester, single or unspecified fetus          Ena GARCIA (Scribe), am scribing for, and in the presence of, Becca Newton M.D..    Electronically signed by: Ena Husain (Jessyibe), 5/25/2023    Becca GARCIA M.D. personally performed the services described in this documentation, as scribed by Ena Husain in my presence, and it is both accurate and complete.    The note accurately reflects work and decisions made by me.  Becca Newton M.D.  5/25/2023  4:35 PM

## 2023-05-26 ENCOUNTER — OFFICE VISIT (OUTPATIENT)
Dept: NEUROLOGY | Facility: MEDICAL CENTER | Age: 28
End: 2023-05-26
Attending: STUDENT IN AN ORGANIZED HEALTH CARE EDUCATION/TRAINING PROGRAM
Payer: MEDICAID

## 2023-05-26 VITALS
BODY MASS INDEX: 25.54 KG/M2 | TEMPERATURE: 97.4 F | HEIGHT: 66 IN | SYSTOLIC BLOOD PRESSURE: 120 MMHG | HEART RATE: 64 BPM | OXYGEN SATURATION: 96 % | RESPIRATION RATE: 16 BRPM | DIASTOLIC BLOOD PRESSURE: 64 MMHG | WEIGHT: 158.9 LBS

## 2023-05-26 DIAGNOSIS — F31.9 BIPOLAR AFFECTIVE DISORDER, REMISSION STATUS UNSPECIFIED (HCC): ICD-10-CM

## 2023-05-26 DIAGNOSIS — G40.919 BREAKTHROUGH SEIZURE (HCC): ICD-10-CM

## 2023-05-26 DIAGNOSIS — O09.91 HIGH-RISK PREGNANCY IN FIRST TRIMESTER: ICD-10-CM

## 2023-05-26 DIAGNOSIS — F51.01 PRIMARY INSOMNIA: ICD-10-CM

## 2023-05-26 DIAGNOSIS — G40.401 OTHER GENERALIZED EPILEPSY, NOT INTRACTABLE, WITH STATUS EPILEPTICUS (HCC): ICD-10-CM

## 2023-05-26 DIAGNOSIS — Z3A.01 5 WEEKS GESTATION OF PREGNANCY: ICD-10-CM

## 2023-05-26 PROCEDURE — 3074F SYST BP LT 130 MM HG: CPT | Performed by: STUDENT IN AN ORGANIZED HEALTH CARE EDUCATION/TRAINING PROGRAM

## 2023-05-26 PROCEDURE — 3078F DIAST BP <80 MM HG: CPT | Performed by: STUDENT IN AN ORGANIZED HEALTH CARE EDUCATION/TRAINING PROGRAM

## 2023-05-26 PROCEDURE — 99214 OFFICE O/P EST MOD 30 MIN: CPT | Performed by: STUDENT IN AN ORGANIZED HEALTH CARE EDUCATION/TRAINING PROGRAM

## 2023-05-26 PROCEDURE — 99212 OFFICE O/P EST SF 10 MIN: CPT | Performed by: STUDENT IN AN ORGANIZED HEALTH CARE EDUCATION/TRAINING PROGRAM

## 2023-05-26 RX ORDER — LEVETIRACETAM 1000 MG/1
1000 TABLET ORAL 2 TIMES DAILY
Qty: 60 TABLET | Refills: 11 | Status: SHIPPED | OUTPATIENT
Start: 2023-05-26 | End: 2023-06-02

## 2023-05-26 ASSESSMENT — FIBROSIS 4 INDEX: FIB4 SCORE: 0.37

## 2023-05-26 NOTE — PROGRESS NOTES
NEUROLOGY FOLLOW-UP - 05/26/2023   REFERRING PROVIDER  Layton Casillas M.D.  [address]  [place] [address]    PCP  Pcp Pt States None   None   None     REASON FOR VISIT: Zeina Davis 27 y.o. female presents today for follow-up.     SUMMARY OF PROBLEMS AND/OR DIAGNOSES AS PER MY PRIOR NOTES/ENCOUNTERS:    INTERVAL HISTORY:    27-year-old female who I am seeing on an urgent/emergent basis with breaks through convulsions/tonic-clonic seizures.  Starting in the past couple weeks she had been seizure-free and started having breakthrough seizures while on lamotrigine.  Because she thought this medication was actually causing her seizures she stopped this medication.  Had ongoing seizures, up to 7-9 times per day.  She was hospitalized, went to the ED yesterday for cluster of seizures.  I spoke with the ED doctor, Dr. Newton, and recommended she be loaded with IV Keppra 2000 mg and start back on Keppra 500 mg twice daily.  She has tolerated this medication before.  However, she does have a history of mood symptoms for which she requires psychotropic medications.  Discussed side effects to Keppra.  Discussed the fact that the greatest harm to her baby at this point is actually ongoing convulsions.  She reports having a couple of seizures since being discharged from the ED, convulsions again.  She is 5 weeks pregnant.  She has a toddler whose pregnancy was very smooth without needing any antiseizure medications.  She did not have any seizures during that time.      CURRENT MEDICATIONS AT THE TIME OF THIS ENCOUNTER:    Current Outpatient Medications:     levETIRAcetam, 1,000 mg, Oral, BID    Midazolam, 5 mg, Nasal, BID PRN    phenazopyridine, 200 mg, Oral, TID PRN, Taking    buPROPion, 100 mg, Oral, BID, Taking    naltrexone, 50 mg, Oral, DAILY, Taking    ondansetron, 4 mg, Oral, Q8HRS PRN, PRN    ARIPiprazole, 10 mg, Oral, Q EVENING, Taking    escitalopram, 15 mg, Oral, DAILY, Taking    QUEtiapine, 200  "mg, Oral, QHS, Taking     EXAM:   Encounter Vitals  /64 (BP Location: Right arm, Patient Position: Sitting, BP Cuff Size: Adult)   Pulse 64   Temp 36.3 °C (97.4 °F) (Temporal)   Resp 16   Ht 1.676 m (5' 6\")   Wt 72.1 kg (158 lb 14.4 oz)   SpO2 96%            Physical Exam:  Physical Exam  Constitutional:       General: She is not in acute distress.     Appearance: She is not ill-appearing.   HENT:      Head: Normocephalic and atraumatic.      Nose: Nose normal. No congestion or rhinorrhea.      Mouth/Throat:      Mouth: Mucous membranes are moist.      Pharynx: No oropharyngeal exudate or posterior oropharyngeal erythema.   Cardiovascular:      Rate and Rhythm: Normal rate and regular rhythm.      Pulses: Normal pulses.      Heart sounds: Normal heart sounds.   Pulmonary:      Effort: Pulmonary effort is normal.      Breath sounds: Normal breath sounds. No wheezing.   Musculoskeletal:      Cervical back: Normal range of motion and neck supple. No rigidity.   Neurological:      Mental Status: She is alert.      Motor: Motor strength is normal.       Neurological Exam   Neurological Exam  Mental Status  Alert. Oriented to person, place and time.    Cranial Nerves  CN VII: Full and symmetric facial movement.    Motor   Strength is 5/5 throughout all four extremities.    Coordination  Right: Finger-to-nose normal.Left: Finger-to-nose normal.    Gait  Casual gait is normal including stance, stride, and arm swing.       ALL DATA (I.e. labs, procedures, imaging, reports, clinical notes, etc.) FROM [facility] AND/OR OUTSIDE SOURCES, IF AVAILABLE, PERSONALLY REVIEWED:       ASSESSMENT, EDUCATION, AND COUNSELING:  This is a 27 y.o. female patient who presents to the neurology clinic. We had an extensive discussion about the patient's symptoms, signs, and work-up to date, if any. We discussed potential and/or definitive diagnoses, work-up, and potential treatments.       PLAN:  If applicable, the work-up such as labs, " imaging, procedures, and/or other testing, referrals, and/or recommended treatment strategies are listed below.    Visit Diagnoses     ICD-10-CM   1. Bipolar affective disorder, remission status unspecified (HCC)  F31.9   2. Other generalized epilepsy, not intractable, with status epilepticus (HCC)  G40.401   3. Primary insomnia  F51.01   4. Breakthrough seizure (HCC)  G40.919   5. 5 weeks gestation of pregnancy  Z3A.01   6. High-risk pregnancy in first trimester  O09.91        Orders Placed This Encounter    levETIRAcetam (KEPPRA) 1000 MG tablet    Midazolam 5 MG/0.1ML Solution      27-year-old who is 5 weeks pregnant who is having frequent daily seizures, tonic-clonic starting a week or 2 ago in the setting of her finding out she is pregnant.  Discussed the fact that the levels of her medication likely changed accounting for her breakthrough seizures while on Lamictal.  Also said that her Keppra levels will need to be checked on a regular basis since once physiology changes during pregnancy such that the normal steady state of Keppra that is in the serum can decrease by as much as 30 to 40%.  She had previously been on Keppra 500 mg twice daily as nonpregnant dose regimen.  Given that she has had a couple of seizures since being discharged from the ED and anticipation that she will likely need higher maintenance doses going forward I recommend that we already increase her Keppra from 500 twice a day to 1000 twice a day.  I will write her a new prescription.  I also recommend that when she gets her new Keppra that she takes 2000 mg orally right away, with the goal of getting her to a new steady state more quickly so as to prevent and ultimately stop any more of these convulsions.  Again I emphasized that its the convulsions that place her baby most at risk for harm and that we need to take aggressive measures to ensure that we get this under control.  I want to see her every week for now to check-in and make  adjustments if we need to to get this under control.  I am also recommending that she have on hand rescue nasal Law since she has a tendency to have clustering of seizures.  Both medications, Keppra and nasal Law sent to pharmacy.  In the next couple weeks we will repeat blood work including Keppra levels to ensure everything is therapeutic and to get a baseline of her Keppra levels.  Finally, discussed that giving the high side effects that Keppra can have on mood, recommend that she take 2 to 400 mg of vitamin B6 daily which can help with that.  If need be, we can potentially switch to Briviact which has a more tolerable mood side effect profile.  Will defer this switch for now and see how she does on Keppra.  Patient in agreement with the plan as I discussed that        BILLING DOCUMENTATION:     I spent a total of I spent a total of 35 minutes on the day of the visit.   of face-to-face time in this visit. Over 50% of the time of the visit today was spent on counseling and/or coordination of care wtih the patient and/or family, as above in assessment in plan.    Layton Casillas MD  Department of Neurology at [facility]  Diplomate of the American Board of Psychiatry and Neurology, General Neurology  Diplomate of American Board of Psychiatry and Neurology, a Member Board of the American Board of Medical Subspecialties, Epilepsy  Director of [facility]'s Level III Comprehensive Epilepsy Program  Professor of Clinical Neurology, [facility] of [facility].   [address]  [facility] [id number] [phone]   Fax: [phone]  E-mail: [email or web address]

## 2023-05-29 ENCOUNTER — APPOINTMENT (OUTPATIENT)
Dept: RADIOLOGY | Facility: MEDICAL CENTER | Age: 28
End: 2023-05-29
Attending: EMERGENCY MEDICINE
Payer: MEDICAID

## 2023-05-29 ENCOUNTER — HOSPITAL ENCOUNTER (EMERGENCY)
Facility: MEDICAL CENTER | Age: 28
End: 2023-05-29
Attending: EMERGENCY MEDICINE
Payer: MEDICAID

## 2023-05-29 VITALS
WEIGHT: 158 LBS | HEART RATE: 60 BPM | OXYGEN SATURATION: 94 % | HEIGHT: 66 IN | DIASTOLIC BLOOD PRESSURE: 62 MMHG | RESPIRATION RATE: 16 BRPM | BODY MASS INDEX: 25.39 KG/M2 | SYSTOLIC BLOOD PRESSURE: 99 MMHG | TEMPERATURE: 97.7 F

## 2023-05-29 DIAGNOSIS — R56.9 SEIZURE (HCC): ICD-10-CM

## 2023-05-29 DIAGNOSIS — Z34.91 FIRST TRIMESTER PREGNANCY: ICD-10-CM

## 2023-05-29 LAB — B-HCG SERPL-ACNC: ABNORMAL MIU/ML (ref 0–5)

## 2023-05-29 PROCEDURE — 36415 COLL VENOUS BLD VENIPUNCTURE: CPT

## 2023-05-29 PROCEDURE — 84702 CHORIONIC GONADOTROPIN TEST: CPT

## 2023-05-29 PROCEDURE — 76801 OB US < 14 WKS SINGLE FETUS: CPT

## 2023-05-29 PROCEDURE — 99283 EMERGENCY DEPT VISIT LOW MDM: CPT

## 2023-05-29 ASSESSMENT — FIBROSIS 4 INDEX: FIB4 SCORE: 0.37

## 2023-05-29 ASSESSMENT — LIFESTYLE VARIABLES
DO YOU DRINK ALCOHOL: NO
DOES PATIENT WANT TO STOP DRINKING: NO

## 2023-05-29 NOTE — ED TRIAGE NOTES
Chief Complaint   Patient presents with    Seizure     Pt states sz at work today lasting approx 5 seconds.  Pt states 5 weeks pregnant

## 2023-06-02 ENCOUNTER — OFFICE VISIT (OUTPATIENT)
Dept: NEUROLOGY | Facility: MEDICAL CENTER | Age: 28
End: 2023-06-02
Attending: STUDENT IN AN ORGANIZED HEALTH CARE EDUCATION/TRAINING PROGRAM
Payer: MEDICAID

## 2023-06-02 VITALS
SYSTOLIC BLOOD PRESSURE: 102 MMHG | TEMPERATURE: 98.7 F | DIASTOLIC BLOOD PRESSURE: 70 MMHG | HEART RATE: 65 BPM | OXYGEN SATURATION: 91 % | WEIGHT: 161.82 LBS | BODY MASS INDEX: 26.01 KG/M2 | HEIGHT: 66 IN

## 2023-06-02 DIAGNOSIS — Z3A.01 5 WEEKS GESTATION OF PREGNANCY: ICD-10-CM

## 2023-06-02 DIAGNOSIS — G40.909 SEIZURE DISORDER (HCC): ICD-10-CM

## 2023-06-02 DIAGNOSIS — G40.401 OTHER GENERALIZED EPILEPSY, NOT INTRACTABLE, WITH STATUS EPILEPTICUS (HCC): ICD-10-CM

## 2023-06-02 DIAGNOSIS — G40.919 BREAKTHROUGH SEIZURE (HCC): ICD-10-CM

## 2023-06-02 DIAGNOSIS — O09.91 HIGH-RISK PREGNANCY IN FIRST TRIMESTER: ICD-10-CM

## 2023-06-02 PROCEDURE — 3074F SYST BP LT 130 MM HG: CPT | Performed by: STUDENT IN AN ORGANIZED HEALTH CARE EDUCATION/TRAINING PROGRAM

## 2023-06-02 PROCEDURE — 99212 OFFICE O/P EST SF 10 MIN: CPT | Performed by: STUDENT IN AN ORGANIZED HEALTH CARE EDUCATION/TRAINING PROGRAM

## 2023-06-02 PROCEDURE — 3078F DIAST BP <80 MM HG: CPT | Performed by: STUDENT IN AN ORGANIZED HEALTH CARE EDUCATION/TRAINING PROGRAM

## 2023-06-02 PROCEDURE — 99215 OFFICE O/P EST HI 40 MIN: CPT | Performed by: STUDENT IN AN ORGANIZED HEALTH CARE EDUCATION/TRAINING PROGRAM

## 2023-06-02 RX ORDER — LEVETIRACETAM 750 MG/1
1500 TABLET ORAL 2 TIMES DAILY
Qty: 360 TABLET | Refills: 3 | Status: ON HOLD | OUTPATIENT
Start: 2023-06-02 | End: 2023-09-13 | Stop reason: SDUPTHER

## 2023-06-02 ASSESSMENT — FIBROSIS 4 INDEX: FIB4 SCORE: 0.37

## 2023-06-02 NOTE — PROGRESS NOTES
"NEUROLOGY FOLLOW-UP - 06/02/2023   REFERRING PROVIDER  Layton Casillas M.D.  [address]  [place] [address]    PCP  Pcp Pt States None   None   None     REASON FOR VISIT: Zeina Davis 27 y.o. female presents today for follow-up.     SUMMARY OF PROBLEMS AND/OR DIAGNOSES AS PER MY PRIOR NOTES/ENCOUNTERS:    INTERVAL HISTORY:      Patient returns for f/u. She is 6 weeks pregnant and had breakthrough seizures the past couple of weeks, prolonged convulsions lasting minutes. Last week we increased her keppra from 500 mg BID to 100 mg BID which has helped decreased the frequency, intensity, and durations of seizures. However, she still has had within the past 48 hours brief convulsions lasting 10 seconds or so with no postictal period. She has no other complaints. She is compliant. No SE to keppra.    CURRENT MEDICATIONS AT THE TIME OF THIS ENCOUNTER:    Current Outpatient Medications:     levetiracetam, 1,500 mg, Oral, BID    phenazopyridine, 200 mg, Oral, TID PRN, Taking    buPROPion, 100 mg, Oral, BID, Taking    naltrexone, 50 mg, Oral, DAILY, Taking    ondansetron, 4 mg, Oral, Q8HRS PRN, Taking    ARIPiprazole, 10 mg, Oral, Q EVENING, Taking    escitalopram, 15 mg, Oral, DAILY, Taking    QUEtiapine, 200 mg, Oral, QHS, Taking     EXAM:   Encounter Vitals  /70   Pulse 65   Temp 37.1 °C (98.7 °F) (Temporal)   Ht 1.676 m (5' 6\")   Wt 73.4 kg (161 lb 13.1 oz)   SpO2 91%            Physical Exam:  Physical Exam  Constitutional:       General: She is not in acute distress.     Appearance: She is not ill-appearing.   HENT:      Head: Normocephalic and atraumatic.      Nose: Nose normal. No congestion or rhinorrhea.      Mouth/Throat:      Mouth: Mucous membranes are moist.      Pharynx: No oropharyngeal exudate or posterior oropharyngeal erythema.   Cardiovascular:      Rate and Rhythm: Normal rate and regular rhythm.      Pulses: Normal pulses.      Heart sounds: Normal heart sounds. "   Pulmonary:      Effort: Pulmonary effort is normal.      Breath sounds: Normal breath sounds. No wheezing.   Musculoskeletal:      Cervical back: Normal range of motion and neck supple. No rigidity.   Neurological:      Mental Status: She is alert.      Motor: Motor strength is normal.       Neurological Exam   Neurological Exam  Mental Status  Alert. Oriented to person, place and time.    Cranial Nerves  CN VII: Full and symmetric facial movement.    Motor   Strength is 5/5 throughout all four extremities.    Coordination  Right: Finger-to-nose normal.Left: Finger-to-nose normal.    Gait  Casual gait is normal including stance, stride, and arm swing.       ALL DATA (I.e. labs, procedures, imaging, reports, clinical notes, etc.) FROM [facility] AND/OR OUTSIDE SOURCES, IF AVAILABLE, PERSONALLY REVIEWED:       ASSESSMENT, EDUCATION, AND COUNSELING:  This is a 27 y.o. female patient who presents to the neurology clinic. We had an extensive discussion about the patient's symptoms, signs, and work-up to date, if any. We discussed potential and/or definitive diagnoses, work-up, and potential treatments.       PLAN:  If applicable, the work-up such as labs, imaging, procedures, and/or other testing, referrals, and/or recommended treatment strategies are listed below.    Visit Diagnoses     ICD-10-CM   1. 5 weeks gestation of pregnancy  Z3A.01   2. Seizure disorder (HCC)  G40.909   3. High-risk pregnancy in first trimester  O09.91   4. Breakthrough seizure (HCC)  G40.919   5. Other generalized epilepsy, not intractable, with status epilepticus (HCC)  G40.401        Orders Placed This Encounter    KEPPRA    levetiracetam (KEPPRA) 750 MG tablet        Patient with recent onset of breakthrough convulsions after finding out she is pregnant.  She returns for close follow-up.  She is 6 weeks pregnant.  Last week we increased her Keppra from 500 twice daily to 1000 twice daily with some better control in her seizures.  However, in  the past couple days she still has had some small very brief convulsions that are still of concern.  Therefore we will continue to increase her Keppra to 1500 twice daily.  Also obtaining a Keppra level just prior to our visit next week at a time when she should be had a new steady state with a higher dose.  She is tolerating Keppra without any side effects so far.  She is otherwise doing well.  Follow-up next week as planned.        BILLING DOCUMENTATION:     I spent a total of I spent a total of 40 minutes on the day of the visit.   of face-to-face time in this visit. Over 50% of the time of the visit today was spent on counseling and/or coordination of care wtih the patient and/or family, as above in assessment in plan.    Layton Casillas MD  Department of Neurology at [facility]  Diplomate of the American Board of Psychiatry and Neurology, General Neurology  Diplomate of American Board of Psychiatry and Neurology, a Member Board of the American Board of Medical Subspecialties, Epilepsy  Director of [facility]'s Level III Comprehensive Epilepsy Program  Professor of Clinical Neurology, [facility].   [address], [address]  [facility] [id number] [phone]   Fax: [phone]  E-mail: [email or web address]

## 2023-09-12 ENCOUNTER — HOSPITAL ENCOUNTER (INPATIENT)
Facility: MEDICAL CENTER | Age: 28
LOS: 1 days | DRG: 832 | End: 2023-09-13
Attending: EMERGENCY MEDICINE | Admitting: FAMILY MEDICINE
Payer: MEDICAID

## 2023-09-12 ENCOUNTER — TELEPHONE (OUTPATIENT)
Dept: NEUROLOGY | Facility: MEDICAL CENTER | Age: 28
End: 2023-09-12
Payer: MEDICAID

## 2023-09-12 DIAGNOSIS — R11.10 VOMITING AND DIARRHEA: ICD-10-CM

## 2023-09-12 DIAGNOSIS — G40.401 OTHER GENERALIZED EPILEPSY, NOT INTRACTABLE, WITH STATUS EPILEPTICUS (HCC): ICD-10-CM

## 2023-09-12 DIAGNOSIS — R56.9 SEIZURE (HCC): ICD-10-CM

## 2023-09-12 DIAGNOSIS — R11.2 NAUSEA AND VOMITING, UNSPECIFIED VOMITING TYPE: ICD-10-CM

## 2023-09-12 DIAGNOSIS — Z3A.20 20 WEEKS GESTATION OF PREGNANCY: ICD-10-CM

## 2023-09-12 DIAGNOSIS — G40.919 BREAKTHROUGH SEIZURE (HCC): ICD-10-CM

## 2023-09-12 DIAGNOSIS — R19.7 VOMITING AND DIARRHEA: ICD-10-CM

## 2023-09-12 DIAGNOSIS — Z3A.01 5 WEEKS GESTATION OF PREGNANCY: ICD-10-CM

## 2023-09-12 DIAGNOSIS — G40.909 SEIZURE DISORDER (HCC): ICD-10-CM

## 2023-09-12 DIAGNOSIS — O09.91 HIGH-RISK PREGNANCY IN FIRST TRIMESTER: ICD-10-CM

## 2023-09-12 PROBLEM — Z3A.21 21 WEEKS GESTATION OF PREGNANCY: Status: ACTIVE | Noted: 2023-09-12

## 2023-09-12 LAB
ALBUMIN SERPL BCP-MCNC: 3.7 G/DL (ref 3.2–4.9)
ALBUMIN/GLOB SERPL: 1.3 G/DL
ALP SERPL-CCNC: 51 U/L (ref 30–99)
ALT SERPL-CCNC: 8 U/L (ref 2–50)
ANION GAP SERPL CALC-SCNC: 13 MMOL/L (ref 7–16)
APPEARANCE UR: CLEAR
AST SERPL-CCNC: 13 U/L (ref 12–45)
BACTERIA #/AREA URNS HPF: ABNORMAL /HPF
BASOPHILS # BLD AUTO: 0.1 % (ref 0–1.8)
BASOPHILS # BLD: 0.01 K/UL (ref 0–0.12)
BILIRUB SERPL-MCNC: 0.3 MG/DL (ref 0.1–1.5)
BILIRUB UR QL STRIP.AUTO: NEGATIVE
BUN SERPL-MCNC: 6 MG/DL (ref 8–22)
CALCIUM ALBUM COR SERPL-MCNC: 8.7 MG/DL (ref 8.5–10.5)
CALCIUM SERPL-MCNC: 8.5 MG/DL (ref 8.5–10.5)
CHLORIDE SERPL-SCNC: 105 MMOL/L (ref 96–112)
CO2 SERPL-SCNC: 18 MMOL/L (ref 20–33)
COLOR UR: YELLOW
CREAT SERPL-MCNC: 0.4 MG/DL (ref 0.5–1.4)
EOSINOPHIL # BLD AUTO: 0.16 K/UL (ref 0–0.51)
EOSINOPHIL NFR BLD: 2.1 % (ref 0–6.9)
EPI CELLS #/AREA URNS HPF: ABNORMAL /HPF
ERYTHROCYTE [DISTWIDTH] IN BLOOD BY AUTOMATED COUNT: 44.2 FL (ref 35.9–50)
GFR SERPLBLD CREATININE-BSD FMLA CKD-EPI: 138 ML/MIN/1.73 M 2
GLOBULIN SER CALC-MCNC: 2.8 G/DL (ref 1.9–3.5)
GLUCOSE SERPL-MCNC: 90 MG/DL (ref 65–99)
GLUCOSE UR STRIP.AUTO-MCNC: NEGATIVE MG/DL
HCT VFR BLD AUTO: 33 % (ref 37–47)
HGB BLD-MCNC: 11.3 G/DL (ref 12–16)
HYALINE CASTS #/AREA URNS LPF: ABNORMAL /LPF
IMM GRANULOCYTES # BLD AUTO: 0.04 K/UL (ref 0–0.11)
IMM GRANULOCYTES NFR BLD AUTO: 0.5 % (ref 0–0.9)
KETONES UR STRIP.AUTO-MCNC: NEGATIVE MG/DL
LEUKOCYTE ESTERASE UR QL STRIP.AUTO: ABNORMAL
LIPASE SERPL-CCNC: 21 U/L (ref 11–82)
LYMPHOCYTES # BLD AUTO: 1.93 K/UL (ref 1–4.8)
LYMPHOCYTES NFR BLD: 25.9 % (ref 22–41)
MAGNESIUM SERPL-MCNC: 1.8 MG/DL (ref 1.5–2.5)
MCH RBC QN AUTO: 31.5 PG (ref 27–33)
MCHC RBC AUTO-ENTMCNC: 34.2 G/DL (ref 32.2–35.5)
MCV RBC AUTO: 91.9 FL (ref 81.4–97.8)
MICRO URNS: ABNORMAL
MONOCYTES # BLD AUTO: 0.36 K/UL (ref 0–0.85)
MONOCYTES NFR BLD AUTO: 4.8 % (ref 0–13.4)
NEUTROPHILS # BLD AUTO: 4.96 K/UL (ref 1.82–7.42)
NEUTROPHILS NFR BLD: 66.6 % (ref 44–72)
NITRITE UR QL STRIP.AUTO: NEGATIVE
NRBC # BLD AUTO: 0 K/UL
NRBC BLD-RTO: 0 /100 WBC (ref 0–0.2)
PH UR STRIP.AUTO: 8 [PH] (ref 5–8)
PHOSPHATE SERPL-MCNC: 3.1 MG/DL (ref 2.5–4.5)
PLATELET # BLD AUTO: 233 K/UL (ref 164–446)
PMV BLD AUTO: 9.4 FL (ref 9–12.9)
POTASSIUM SERPL-SCNC: 4 MMOL/L (ref 3.6–5.5)
PROT SERPL-MCNC: 6.5 G/DL (ref 6–8.2)
PROT UR QL STRIP: NEGATIVE MG/DL
RBC # BLD AUTO: 3.59 M/UL (ref 4.2–5.4)
RBC # URNS HPF: ABNORMAL /HPF
RBC UR QL AUTO: NEGATIVE
SODIUM SERPL-SCNC: 136 MMOL/L (ref 135–145)
SP GR UR STRIP.AUTO: 1.01
UROBILINOGEN UR STRIP.AUTO-MCNC: 0.2 MG/DL
WBC # BLD AUTO: 7.5 K/UL (ref 4.8–10.8)
WBC #/AREA URNS HPF: ABNORMAL /HPF

## 2023-09-12 PROCEDURE — 84100 ASSAY OF PHOSPHORUS: CPT

## 2023-09-12 PROCEDURE — A9270 NON-COVERED ITEM OR SERVICE: HCPCS | Performed by: STUDENT IN AN ORGANIZED HEALTH CARE EDUCATION/TRAINING PROGRAM

## 2023-09-12 PROCEDURE — 96375 TX/PRO/DX INJ NEW DRUG ADDON: CPT

## 2023-09-12 PROCEDURE — 81001 URINALYSIS AUTO W/SCOPE: CPT

## 2023-09-12 PROCEDURE — 83735 ASSAY OF MAGNESIUM: CPT

## 2023-09-12 PROCEDURE — 99285 EMERGENCY DEPT VISIT HI MDM: CPT

## 2023-09-12 PROCEDURE — 96365 THER/PROPH/DIAG IV INF INIT: CPT

## 2023-09-12 PROCEDURE — 700102 HCHG RX REV CODE 250 W/ 637 OVERRIDE(OP): Performed by: STUDENT IN AN ORGANIZED HEALTH CARE EDUCATION/TRAINING PROGRAM

## 2023-09-12 PROCEDURE — 80053 COMPREHEN METABOLIC PANEL: CPT

## 2023-09-12 PROCEDURE — 700111 HCHG RX REV CODE 636 W/ 250 OVERRIDE (IP): Performed by: STUDENT IN AN ORGANIZED HEALTH CARE EDUCATION/TRAINING PROGRAM

## 2023-09-12 PROCEDURE — 700111 HCHG RX REV CODE 636 W/ 250 OVERRIDE (IP): Mod: JZ,UD | Performed by: EMERGENCY MEDICINE

## 2023-09-12 PROCEDURE — 770020 HCHG ROOM/CARE - TELE (206)

## 2023-09-12 PROCEDURE — 83690 ASSAY OF LIPASE: CPT

## 2023-09-12 PROCEDURE — 36415 COLL VENOUS BLD VENIPUNCTURE: CPT

## 2023-09-12 PROCEDURE — 700105 HCHG RX REV CODE 258: Mod: UD | Performed by: EMERGENCY MEDICINE

## 2023-09-12 PROCEDURE — 85025 COMPLETE CBC W/AUTO DIFF WBC: CPT

## 2023-09-12 RX ORDER — ONDANSETRON 4 MG/1
4 TABLET, ORALLY DISINTEGRATING ORAL EVERY 4 HOURS PRN
Status: DISCONTINUED | OUTPATIENT
Start: 2023-09-12 | End: 2023-09-13 | Stop reason: HOSPADM

## 2023-09-12 RX ORDER — ACETAMINOPHEN 325 MG/1
650 TABLET ORAL EVERY 6 HOURS PRN
Status: DISCONTINUED | OUTPATIENT
Start: 2023-09-12 | End: 2023-09-13 | Stop reason: HOSPADM

## 2023-09-12 RX ORDER — SODIUM CHLORIDE, SODIUM LACTATE, POTASSIUM CHLORIDE, CALCIUM CHLORIDE 600; 310; 30; 20 MG/100ML; MG/100ML; MG/100ML; MG/100ML
1000 INJECTION, SOLUTION INTRAVENOUS ONCE
Status: COMPLETED | OUTPATIENT
Start: 2023-09-12 | End: 2023-09-12

## 2023-09-12 RX ORDER — SODIUM CHLORIDE 9 MG/ML
INJECTION, SOLUTION INTRAVENOUS ONCE
Status: DISCONTINUED | OUTPATIENT
Start: 2023-09-12 | End: 2023-09-12

## 2023-09-12 RX ORDER — VITAMIN A ACETATE, BETA CAROTENE, ASCORBIC ACID, CHOLECALCIFEROL, .ALPHA.-TOCOPHEROL ACETATE, DL-, THIAMINE MONONITRATE, RIBOFLAVIN, NIACINAMIDE, PYRIDOXINE HYDROCHLORIDE, FOLIC ACID, CYANOCOBALAMIN, CALCIUM CARBONATE, FERROUS FUMARATE, ZINC OXIDE, CUPRIC OXIDE 3080; 12; 120; 400; 1; 1.84; 3; 20; 22; 920; 25; 200; 27; 10; 2 [IU]/1; UG/1; MG/1; [IU]/1; MG/1; MG/1; MG/1; MG/1; MG/1; [IU]/1; MG/1; MG/1; MG/1; MG/1; MG/1
1 TABLET, FILM COATED ORAL
Status: DISCONTINUED | OUTPATIENT
Start: 2023-09-13 | End: 2023-09-13 | Stop reason: HOSPADM

## 2023-09-12 RX ORDER — ONDANSETRON 2 MG/ML
4 INJECTION INTRAMUSCULAR; INTRAVENOUS EVERY 4 HOURS PRN
Status: DISCONTINUED | OUTPATIENT
Start: 2023-09-12 | End: 2023-09-13 | Stop reason: HOSPADM

## 2023-09-12 RX ORDER — LABETALOL HYDROCHLORIDE 5 MG/ML
10 INJECTION, SOLUTION INTRAVENOUS EVERY 4 HOURS PRN
Status: DISCONTINUED | OUTPATIENT
Start: 2023-09-12 | End: 2023-09-13 | Stop reason: HOSPADM

## 2023-09-12 RX ORDER — MAGNESIUM SULFATE HEPTAHYDRATE 40 MG/ML
2 INJECTION, SOLUTION INTRAVENOUS ONCE
Status: COMPLETED | OUTPATIENT
Start: 2023-09-12 | End: 2023-09-12

## 2023-09-12 RX ORDER — FOLIC ACID 1 MG/1
1 TABLET ORAL DAILY
Status: DISCONTINUED | OUTPATIENT
Start: 2023-09-13 | End: 2023-09-13 | Stop reason: HOSPADM

## 2023-09-12 RX ORDER — ONDANSETRON 2 MG/ML
4 INJECTION INTRAMUSCULAR; INTRAVENOUS ONCE
Status: COMPLETED | OUTPATIENT
Start: 2023-09-12 | End: 2023-09-12

## 2023-09-12 RX ORDER — QUETIAPINE FUMARATE 100 MG/1
200 TABLET, FILM COATED ORAL
Status: DISCONTINUED | OUTPATIENT
Start: 2023-09-12 | End: 2023-09-13 | Stop reason: HOSPADM

## 2023-09-12 RX ORDER — ESCITALOPRAM OXALATE 10 MG/1
15 TABLET ORAL DAILY
Status: DISCONTINUED | OUTPATIENT
Start: 2023-09-13 | End: 2023-09-12

## 2023-09-12 RX ORDER — LEVETIRACETAM 500 MG/5ML
3000 INJECTION, SOLUTION, CONCENTRATE INTRAVENOUS ONCE
Status: COMPLETED | OUTPATIENT
Start: 2023-09-12 | End: 2023-09-12

## 2023-09-12 RX ORDER — LEVETIRACETAM 250 MG/1
1500 TABLET ORAL 2 TIMES DAILY
Status: DISCONTINUED | OUTPATIENT
Start: 2023-09-12 | End: 2023-09-13 | Stop reason: HOSPADM

## 2023-09-12 RX ADMIN — LEVETIRACETAM 3000 MG: 100 INJECTION, SOLUTION, CONCENTRATE INTRAVENOUS at 15:28

## 2023-09-12 RX ADMIN — SODIUM CHLORIDE, POTASSIUM CHLORIDE, SODIUM LACTATE AND CALCIUM CHLORIDE 1000 ML: 600; 310; 30; 20 INJECTION, SOLUTION INTRAVENOUS at 15:29

## 2023-09-12 RX ADMIN — ONDANSETRON 4 MG: 4 TABLET, ORALLY DISINTEGRATING ORAL at 19:41

## 2023-09-12 RX ADMIN — LEVETIRACETAM 1500 MG: 250 TABLET, FILM COATED ORAL at 19:39

## 2023-09-12 RX ADMIN — ONDANSETRON 4 MG: 2 INJECTION INTRAMUSCULAR; INTRAVENOUS at 15:28

## 2023-09-12 RX ADMIN — QUETIAPINE FUMARATE 200 MG: 100 TABLET ORAL at 20:38

## 2023-09-12 RX ADMIN — MAGNESIUM SULFATE HEPTAHYDRATE 2 G: 2 INJECTION, SOLUTION INTRAVENOUS at 15:46

## 2023-09-12 ASSESSMENT — FIBROSIS 4 INDEX
FIB4 SCORE: .3846153846153846154
FIB4 SCORE: 0.55

## 2023-09-12 NOTE — ED NOTES
ERP at bedside.   Suturegard Intro: Intraoperative tissue expansion was performed, utilizing the SUTUREGARD device, in order to reduce wound tension.

## 2023-09-12 NOTE — ED TRIAGE NOTES
Chief Complaint   Patient presents with    Seizure     Hx of epilepsy. Patient reports increase in seizure activity over the last 2 days. Patient states she had 12 yesterday and 3 today. Per EMS patient had a witnessed 30 second seizure in the ambulance.     N/V     Patient is 20 weeks pregnant. Patient reports increased N/V making it difficult to hold down medications. Patient denies abdominal pain.      Patient states she is unable to hold down her Keppra, Zofran, and suboxone   Patient given 4mg Zofran per EMS

## 2023-09-12 NOTE — ED NOTES
Pt medicated per MAR and updated on POC. Pt awaiting blood results, ERP at bedside for ultrasound. Bed locked in lowest position, seizure precautions in place. Respirations equal and regular, no further needs at this time.

## 2023-09-12 NOTE — ED PROVIDER NOTES
CHIEF COMPLAINT  Chief Complaint   Patient presents with    Seizure     Hx of epilepsy. Patient reports increase in seizure activity over the last 2 days. Patient states she had 12 yesterday and 3 today. Per EMS patient had a witnessed 30 second seizure in the ambulance.     N/V     Patient is 20 weeks pregnant. Patient reports increased N/V making it difficult to hold down medications. Patient denies abdominal pain.        LIMITATION TO HISTORY   Select: none    HPI    Zeina Davis is a 28 y.o. female who presents to the Emergency Department with a history of seizure disorder, opiate use disorder on Suboxone, 20 weeks pregnant presents to the emerged part with complaints of nausea vomiting.  Patient states that she stopped some of her psychiatric medicines because she has been having increased frequency of epilepsy.  She has been seen by her neurologist who recommended that her psych medications may be causing more frequency of her seizures she is currently up to 1500 mg of Keppra 2 times daily, however she has been taken at 3000 mg initially in the morning and then if she needed another dose was taken another 3000 mg during the day..  Patient states that she stopped them about 6 days ago she started having some nausea vomiting that has been intractable vomiting for past 4 days.  The patient been unable to keep her Keppra down.  She states that she probably had 12 seizures yesterday.  Has had 2 more seizures today so she contacted her neurologist group and they told the patient to come the emergency department for further treatment and care.  Upon arrival here she apparently did have another seizure while she was in the ambulance.  Upon arrival here she describes some abdominal cramps nausea vomiting and the above history.  Patient denies any other symptoms.    OUTSIDE HISTORIAN(S):  Select: None    EXTERNAL RECORDS REVIEWED  Select: Other last note by Dr. Gresham no neurology 6/2/2023 was that he increased  Keppra from 1000 to 1500 mg twice daily.      PAST MEDICAL HISTORY  Past Medical History:   Diagnosis Date    Anxiety     Bacterial vaginosis     Depression     Epilepsy (HCC)     Fx clavicle right    Hepatitis C     Migraine     PID (pelvic inflammatory disease)     Psychiatric disorder     bipoal - adhd    Substance abuse (HCC)      .    SURGICAL HISTORY  Past Surgical History:   Procedure Laterality Date    GYN SURGERY      ovarian cysts    OTHER      toncils, adenoids, appendix    OTHER ABDOMINAL SURGERY      telescoping intestines    TONSILLECTOMY           FAMILY HISTORY  Family History   Problem Relation Age of Onset    Bipolar disorder Mother     Depression Mother     Genitourinary () Problems Mother     Heart Attack Mother     Recurrent UTI's Mother     Sexual abuse Mother     Stroke Mother     Alcohol/Drug Father     Anxiety disorder Father     Bipolar disorder Father     Depression Father     Paranoid behavior Father     Psychiatric Illness Father           SOCIAL HISTORY  Social History     Socioeconomic History    Marital status: Single     Spouse name: Not on file    Number of children: Not on file    Years of education: Not on file    Highest education level: Not on file   Occupational History    Not on file   Tobacco Use    Smoking status: Every Day     Current packs/day: 0.50     Average packs/day: 0.5 packs/day for 8.0 years (4.0 ttl pk-yrs)     Types: Cigarettes    Smokeless tobacco: Never   Vaping Use    Vaping Use: Every day    Substances: Nicotine, Flavoring   Substance and Sexual Activity    Alcohol use: Not Currently     Comment: occasional    Drug use: Not Currently     Types: Inhaled     Comment: marijuana    Sexual activity: Not on file   Other Topics Concern    Not on file   Social History Narrative    Not on file     Social Determinants of Health     Financial Resource Strain: Not on file   Food Insecurity: Not on file   Transportation Needs: Not on file   Physical Activity: Not on  "file   Stress: Not on file   Social Connections: Not on file   Intimate Partner Violence: Not on file   Housing Stability: Not on file         CURRENT MEDICATIONS  No current facility-administered medications on file prior to encounter.     Current Outpatient Medications on File Prior to Encounter   Medication Sig Dispense Refill    levetiracetam (KEPPRA) 750 MG tablet Take 2 Tablets by mouth 2 times a day for 360 days. 360 Tablet 3    phenazopyridine (PYRIDIUM) 200 MG Tab Take 1 Tablet by mouth 3 times a day as needed for Mild Pain. 6 Tablet 0    buPROPion (WELLBUTRIN) 100 MG Tab Take 100 mg by mouth 2 times a day.      naltrexone (DEPADE) 50 MG Tab Take 50 mg by mouth every day.      ondansetron (ZOFRAN ODT) 4 MG TABLET DISPERSIBLE Take 1 Tablet by mouth every 8 hours as needed for Nausea/Vomiting. 10 Tablet 0    ARIPiprazole (ABILIFY) 10 MG Tab Take 10 mg by mouth every evening.      escitalopram (LEXAPRO) 10 MG Tab Take 15 mg by mouth every day. 15 mg = 1.5 tablets      QUEtiapine (SEROQUEL) 100 MG Tab Take 200 mg by mouth at bedtime. 200 mg = 2 tablets             ALLERGIES  Allergies   Allergen Reactions    Other Drug      PT STATES SHE DOESN'T TOLERATE THE SEIZURE MEDS AND HAS TOO MANY SIDE EFFECTS    Latex     Morphine Unspecified     Pt reports cardiac arrest       PHYSICAL EXAM  VITAL SIGNS:/55   Pulse (!) 49   Temp 36.8 °C (98.3 °F)   Resp 15   Ht 1.676 m (5' 6\")   Wt 79.4 kg (175 lb)   LMP 04/17/2023 (Approximate)   SpO2 95%   BMI 28.25 kg/m²     Constitutional: Well-developed no acute distress   HENT: Normocephalic, Atraumatic, Bilateral external ears normal.  Eyes:  conjunctiva are normal.   Neck: Supple.  Nontender midline  Cardiovascular: Regular rate and rhythm without murmurs gallops or rubs.   Thorax & Lungs: No respiratory distress. Breathing comfortably. Lungs are clear to auscultation bilaterally, there are no wheezes no rales. Chest wall is nontender.  Abdomen: Soft, no " tenderness bowel sounds present  Skin: Warm, Dry, No erythema,   Back: No tenderness, No CVA tenderness.  Musculoskeletal: No clubbing cyanosis or edema good range of motion   Neurologic: Alert & oriented x 3, normal sensation moving all extremities appears normal   Psychiatric: Affect normal, Judgment normal, Mood normal.       DIAGNOSTIC STUDIES / PROCEDURES      LABS  Results for orders placed or performed during the hospital encounter of 09/12/23   CBC WITH DIFFERENTIAL   Result Value Ref Range    WBC 7.5 4.8 - 10.8 K/uL    RBC 3.59 (L) 4.20 - 5.40 M/uL    Hemoglobin 11.3 (L) 12.0 - 16.0 g/dL    Hematocrit 33.0 (L) 37.0 - 47.0 %    MCV 91.9 81.4 - 97.8 fL    MCH 31.5 27.0 - 33.0 pg    MCHC 34.2 32.2 - 35.5 g/dL    RDW 44.2 35.9 - 50.0 fL    Platelet Count 233 164 - 446 K/uL    MPV 9.4 9.0 - 12.9 fL    Neutrophils-Polys 66.60 44.00 - 72.00 %    Lymphocytes 25.90 22.00 - 41.00 %    Monocytes 4.80 0.00 - 13.40 %    Eosinophils 2.10 0.00 - 6.90 %    Basophils 0.10 0.00 - 1.80 %    Immature Granulocytes 0.50 0.00 - 0.90 %    Nucleated RBC 0.00 0.00 - 0.20 /100 WBC    Neutrophils (Absolute) 4.96 1.82 - 7.42 K/uL    Lymphs (Absolute) 1.93 1.00 - 4.80 K/uL    Monos (Absolute) 0.36 0.00 - 0.85 K/uL    Eos (Absolute) 0.16 0.00 - 0.51 K/uL    Baso (Absolute) 0.01 0.00 - 0.12 K/uL    Immature Granulocytes (abs) 0.04 0.00 - 0.11 K/uL    NRBC (Absolute) 0.00 K/uL   COMP METABOLIC PANEL   Result Value Ref Range    Sodium 136 135 - 145 mmol/L    Potassium 4.0 3.6 - 5.5 mmol/L    Chloride 105 96 - 112 mmol/L    Co2 18 (L) 20 - 33 mmol/L    Anion Gap 13.0 7.0 - 16.0    Glucose 90 65 - 99 mg/dL    Bun 6 (L) 8 - 22 mg/dL    Creatinine 0.40 (L) 0.50 - 1.40 mg/dL    Calcium 8.5 8.5 - 10.5 mg/dL    Correct Calcium 8.7 8.5 - 10.5 mg/dL    AST(SGOT) 13 12 - 45 U/L    ALT(SGPT) 8 2 - 50 U/L    Alkaline Phosphatase 51 30 - 99 U/L    Total Bilirubin 0.3 0.1 - 1.5 mg/dL    Albumin 3.7 3.2 - 4.9 g/dL    Total Protein 6.5 6.0 - 8.2 g/dL     Globulin 2.8 1.9 - 3.5 g/dL    A-G Ratio 1.3 g/dL   LIPASE   Result Value Ref Range    Lipase 21 11 - 82 U/L   URINALYSIS (UA)    Specimen: Urine   Result Value Ref Range    Color Yellow     Character Clear     Specific Gravity 1.010 <1.035    Ph 8.0 5.0 - 8.0    Glucose Negative Negative mg/dL    Ketones Negative Negative mg/dL    Protein Negative Negative mg/dL    Bilirubin Negative Negative    Urobilinogen, Urine 0.2 Negative    Nitrite Negative Negative    Leukocyte Esterase Trace (A) Negative    Occult Blood Negative Negative    Micro Urine Req Microscopic    MAGNESIUM   Result Value Ref Range    Magnesium 1.8 1.5 - 2.5 mg/dL   PHOSPHORUS   Result Value Ref Range    Phosphorus 3.1 2.5 - 4.5 mg/dL   URINE MICROSCOPIC (W/UA)   Result Value Ref Range    WBC 2-5 /hpf    RBC 0-2 /hpf    Bacteria Few (A) None /hpf    Epithelial Cells Few /hpf    Hyaline Cast 0-2 /lpf   ESTIMATED GFR   Result Value Ref Range    GFR (CKD-EPI) 138 >60 mL/min/1.73 m 2         RADIOLOGY  None    COURSE & MEDICAL DECISION MAKING    ED COURSE:    ED Observation Status? Yes;I am placing the patient in to an observation status due to a diagnostic uncertainty as well as therapeutic intensity. Patient placed in observation status at 3:17 PM 9/12/2023    Observation plan is as follows: Patient presents for evaluation of her nausea vomiting seizures.  Apparently the patient had a seizure prior to arrival and was given medications.  After the patient with IV fluid bolus as well as a loading dose of Keppra she is taking 3000 a day so started with a 3000 mg with consultation with pharmacy.  I will get a laboratory studies to evaluate for electrolyte imbalance and observe for effect.    INTERVENTIONS BY ME:  Medications   ondansetron (Zofran) syringe/vial injection 4 mg (4 mg Intravenous Given 9/12/23 1528)   levETIRAcetam (Keppra) injection 3,000 mg (3,000 mg Intravenous Given 9/12/23 1528)   magnesium sulfate IVPB premix 2 g (0 g Intravenous  Stopped 9/12/23 1630)   lactated ringers (LR) bolus (0 mL Intravenous Stopped 9/12/23 1715)     Bedside ultrasound done by myself for fetal heart tones shows an IUP with fetal heart tones 143 bpm      Response on recheck: Patient is improving still feels nausea but no active vomiting after the above therapy.      I have discussed management of the patient with the following physicians and sources:   Neurology Dr. Galvez and OB  Dr. Moya    Patient discharged from ED observation at 5:39 PM 09/12/23 and escalation to admission admitted.      INITIAL ASSESSMENT, COURSE AND PLAN  Care Narrative: Patient presents emerged department for evaluation with the above symptoms.  The patient states that she had 12 seizures yesterday 3 seizures with 1 witnessed by EMS.  I loaded the patient on Keppra started the patient nausea medications and fluids and initiated laboratory studies as above.  Laboratory studies are essentially unconcerning.  Fetal heart tones are seen at bedside on bedside ultrasound at 143 bpm.  I did speak to Dr. Moya from the standpoint of OB and there is no concerns from the standpoint of the baby.  Neurology recommended observation for continued seizure activity and to load the patient however no other neurologic needs are necessary.  I have given the patient a loading bolus but she still feeling nauseated at this point I will admit the patient for overnight IV hydration and to continue with her p.o. meds to ensure that she is able to keep them in the morning.  If there is any continued seizure activity and is recommended to consult neurology.  At this point OB does not feel the need for consultation.      HYDRATION: Based on the patient's presentation of dehydration nausea vomiting,  the patient was given IV fluids. IV Hydration was used because oral hydration is unable to be done due to the patient's symptoms. Upon recheck following hydration, the patient was improving.           ADDITIONAL  PROBLEM LIST  Past Medical History:   Diagnosis Date    Anxiety     Bacterial vaginosis     Depression     Epilepsy (HCC)     Fx clavicle right    Hepatitis C     Migraine     PID (pelvic inflammatory disease)     Psychiatric disorder     bipoal - adhd    Substance abuse (HCC)        DISPOSITION AND DISCUSSIONS    Patient will be admitted for further treatment and care    FINAL DIAGNOSIS  1. Nausea and vomiting, unspecified vomiting type    2. Seizure (HCC)    3. 20 weeks gestation of pregnancy             Electronically signed by: Jamal Wilcox M.D.,5:39 PM 09/12/23

## 2023-09-12 NOTE — TELEPHONE ENCOUNTER
Phone Number Called: [phone]    Call outcome: Spoke to patient regarding message below.    Message: Patient called to ask what she should do because she had 12 seizures yesterday and 2 today. She is 20 weeks pregnant and wondering if she should go to the hospital.

## 2023-09-13 ENCOUNTER — TELEPHONE (OUTPATIENT)
Dept: SCHEDULING | Facility: IMAGING CENTER | Age: 28
End: 2023-09-13

## 2023-09-13 ENCOUNTER — PATIENT OUTREACH (OUTPATIENT)
Dept: SCHEDULING | Facility: IMAGING CENTER | Age: 28
End: 2023-09-13
Payer: MEDICAID

## 2023-09-13 ENCOUNTER — PHARMACY VISIT (OUTPATIENT)
Dept: PHARMACY | Facility: MEDICAL CENTER | Age: 28
End: 2023-09-13
Payer: COMMERCIAL

## 2023-09-13 VITALS
HEIGHT: 66 IN | HEART RATE: 65 BPM | TEMPERATURE: 97.4 F | SYSTOLIC BLOOD PRESSURE: 90 MMHG | BODY MASS INDEX: 29.41 KG/M2 | RESPIRATION RATE: 17 BRPM | OXYGEN SATURATION: 100 % | WEIGHT: 182.98 LBS | DIASTOLIC BLOOD PRESSURE: 49 MMHG

## 2023-09-13 PROBLEM — Z3A.21 21 WEEKS GESTATION OF PREGNANCY: Status: RESOLVED | Noted: 2023-09-12 | Resolved: 2023-09-13

## 2023-09-13 PROBLEM — Z34.90 PREGNANCY: Status: ACTIVE | Noted: 2023-09-13

## 2023-09-13 PROBLEM — R56.9 SEIZURE (HCC): Status: RESOLVED | Noted: 2023-09-12 | Resolved: 2023-09-13

## 2023-09-13 LAB
ALBUMIN SERPL BCP-MCNC: 3 G/DL (ref 3.2–4.9)
ALBUMIN/GLOB SERPL: 1.1 G/DL
ALP SERPL-CCNC: 44 U/L (ref 30–99)
ALT SERPL-CCNC: 8 U/L (ref 2–50)
ANION GAP SERPL CALC-SCNC: 11 MMOL/L (ref 7–16)
AST SERPL-CCNC: 12 U/L (ref 12–45)
BILIRUB SERPL-MCNC: 0.3 MG/DL (ref 0.1–1.5)
BUN SERPL-MCNC: 7 MG/DL (ref 8–22)
CALCIUM ALBUM COR SERPL-MCNC: 8.9 MG/DL (ref 8.5–10.5)
CALCIUM SERPL-MCNC: 8.1 MG/DL (ref 8.5–10.5)
CHLORIDE SERPL-SCNC: 106 MMOL/L (ref 96–112)
CO2 SERPL-SCNC: 19 MMOL/L (ref 20–33)
CREAT SERPL-MCNC: 0.44 MG/DL (ref 0.5–1.4)
ERYTHROCYTE [DISTWIDTH] IN BLOOD BY AUTOMATED COUNT: 45.1 FL (ref 35.9–50)
GFR SERPLBLD CREATININE-BSD FMLA CKD-EPI: 135 ML/MIN/1.73 M 2
GLOBULIN SER CALC-MCNC: 2.7 G/DL (ref 1.9–3.5)
GLUCOSE SERPL-MCNC: 118 MG/DL (ref 65–99)
HCT VFR BLD AUTO: 29.2 % (ref 37–47)
HGB BLD-MCNC: 10.1 G/DL (ref 12–16)
MCH RBC QN AUTO: 32.1 PG (ref 27–33)
MCHC RBC AUTO-ENTMCNC: 34.6 G/DL (ref 32.2–35.5)
MCV RBC AUTO: 92.7 FL (ref 81.4–97.8)
PLATELET # BLD AUTO: 201 K/UL (ref 164–446)
PMV BLD AUTO: 9.5 FL (ref 9–12.9)
POTASSIUM SERPL-SCNC: 3.7 MMOL/L (ref 3.6–5.5)
PROT SERPL-MCNC: 5.7 G/DL (ref 6–8.2)
RBC # BLD AUTO: 3.15 M/UL (ref 4.2–5.4)
SODIUM SERPL-SCNC: 136 MMOL/L (ref 135–145)
WBC # BLD AUTO: 6.9 K/UL (ref 4.8–10.8)

## 2023-09-13 PROCEDURE — 700111 HCHG RX REV CODE 636 W/ 250 OVERRIDE (IP): Performed by: STUDENT IN AN ORGANIZED HEALTH CARE EDUCATION/TRAINING PROGRAM

## 2023-09-13 PROCEDURE — 99222 1ST HOSP IP/OBS MODERATE 55: CPT | Mod: GC | Performed by: FAMILY MEDICINE

## 2023-09-13 PROCEDURE — 85027 COMPLETE CBC AUTOMATED: CPT

## 2023-09-13 PROCEDURE — RXMED WILLOW AMBULATORY MEDICATION CHARGE

## 2023-09-13 PROCEDURE — 700102 HCHG RX REV CODE 250 W/ 637 OVERRIDE(OP): Performed by: STUDENT IN AN ORGANIZED HEALTH CARE EDUCATION/TRAINING PROGRAM

## 2023-09-13 PROCEDURE — 80053 COMPREHEN METABOLIC PANEL: CPT

## 2023-09-13 PROCEDURE — A9270 NON-COVERED ITEM OR SERVICE: HCPCS | Performed by: STUDENT IN AN ORGANIZED HEALTH CARE EDUCATION/TRAINING PROGRAM

## 2023-09-13 RX ORDER — ONDANSETRON 4 MG/1
4 TABLET, ORALLY DISINTEGRATING ORAL 4 TIMES DAILY PRN
Qty: 30 TABLET | Refills: 3 | Status: SHIPPED | OUTPATIENT
Start: 2023-09-13 | End: 2023-09-28

## 2023-09-13 RX ORDER — VITAMIN A ACETATE, BETA CAROTENE, ASCORBIC ACID, CHOLECALCIFEROL, .ALPHA.-TOCOPHEROL ACETATE, DL-, THIAMINE MONONITRATE, RIBOFLAVIN, NIACINAMIDE, PYRIDOXINE HYDROCHLORIDE, FOLIC ACID, CYANOCOBALAMIN, CALCIUM CARBONATE, FERROUS FUMARATE, ZINC OXIDE, CUPRIC OXIDE 3080; 12; 120; 400; 1; 1.84; 3; 20; 22; 920; 25; 200; 27; 10; 2 [IU]/1; UG/1; MG/1; [IU]/1; MG/1; MG/1; MG/1; MG/1; MG/1; [IU]/1; MG/1; MG/1; MG/1; MG/1; MG/1
1 TABLET, FILM COATED ORAL DAILY
Qty: 30 TABLET | Refills: 2 | Status: SHIPPED | OUTPATIENT
Start: 2023-09-13 | End: 2023-09-13

## 2023-09-13 RX ORDER — FOLIC ACID 1 MG/1
1 TABLET ORAL DAILY
Qty: 30 TABLET | Refills: 2 | Status: SHIPPED | OUTPATIENT
Start: 2023-09-14 | End: 2023-09-13

## 2023-09-13 RX ORDER — ONDANSETRON 4 MG/1
4 TABLET, ORALLY DISINTEGRATING ORAL 4 TIMES DAILY PRN
Qty: 30 TABLET | Refills: 3 | Status: SHIPPED | OUTPATIENT
Start: 2023-09-13 | End: 2023-09-13 | Stop reason: SDUPTHER

## 2023-09-13 RX ORDER — LEVETIRACETAM 750 MG/1
1500 TABLET ORAL 2 TIMES DAILY
Qty: 360 TABLET | Refills: 3 | Status: SHIPPED | OUTPATIENT
Start: 2023-09-13 | End: 2023-09-28

## 2023-09-13 RX ADMIN — LEVETIRACETAM 1500 MG: 250 TABLET, FILM COATED ORAL at 08:25

## 2023-09-13 RX ADMIN — ONDANSETRON 4 MG: 4 TABLET, ORALLY DISINTEGRATING ORAL at 12:40

## 2023-09-13 RX ADMIN — ONDANSETRON 4 MG: 4 TABLET, ORALLY DISINTEGRATING ORAL at 08:27

## 2023-09-13 RX ADMIN — PRENATAL WITH FERROUS FUM AND FOLIC ACID 1 TABLET: 3080; 920; 120; 400; 22; 1.84; 3; 20; 10; 1; 12; 200; 27; 25; 2 TABLET ORAL at 08:27

## 2023-09-13 RX ADMIN — FOLIC ACID 1 MG: 1 TABLET ORAL at 05:07

## 2023-09-13 ASSESSMENT — FIBROSIS 4 INDEX: FIB4 SCORE: 0.55

## 2023-09-13 NOTE — ASSESSMENT & PLAN NOTE
The patient reported that she is prescribed Suboxone, however is not taking at this time as it is contributing to her nausea and vomiting.  - Patient declined Suboxone while inpatient.  - Patient to follow-up with with [facility] hopes upon discharge at her next appointment

## 2023-09-13 NOTE — DISCHARGE SUMMARY
"[facility] MEDICINE DISCHARGE SUMMARY     PATIENT ID:  Name:             Zeina Davis   YOB: 1995  Age:                 28 y.o.  female   MRN:               [id number]  Address:         [address] [place] [address]  Phone:            [phone] (home)    ADMISSION DATE:   2023    DISCHARGE DATE:   2023    DISCHARGE DIAGNOSES:   No problems updated.    ATTENDING PHYSICIAN:   Dr. Nazanin Han MD    RESIDENTS:   MD Mallory Landis DO    CONSULTANTS:    Neurologist    PROCEDURES:    None    IMAGING:   None    LABS:  Recent Labs     23  1450 23  0921   WBC 7.5 6.9   RBC 3.59* 3.15*   HEMOGLOBIN 11.3* 10.1*   HEMATOCRIT 33.0* 29.2*   MCV 91.9 92.7   MCH 31.5 32.1   RDW 44.2 45.1   PLATELETCT 233 201   MPV 9.4 9.5   NEUTSPOLYS 66.60  --    LYMPHOCYTES 25.90  --    MONOCYTES 4.80  --    EOSINOPHILS 2.10  --    BASOPHILS 0.10  --      Recent Labs     23  1450 23  0921   SODIUM 136 136   POTASSIUM 4.0 3.7   CHLORIDE 105 106   CO2 18* 19*   GLUCOSE 90 118*   BUN 6* 7*     No results found for: \"CHOLSTRLTOT\", \"LDL\", \"HDL\", \"TRIGLYCERIDE\"    Lab Results   Component Value Date/Time    TROPONINI <0.02 2020 12:20 PM       Lab Results   Component Value Date/Time    TROPONINI <0.02 2020 12:20 PM          HOSPITAL COURSE:   Per admission HPI  \"Zeina Davis is a 28 y.o. -1-18-1 @ 19+6 with an EYAD of 2024 (LMP = 5w US) with a past medical history significant for epilepsy, bipolar disorder, and previous OUD (on Suboxone) admitted on 2023 for multiple epileptic seizures within the last 24h.     The patient reported that she had approximately 12 seizures on 2023.  She subsequently had 1 seizure in the morning of 2023 and another on the ambulance ride to the hospital.  The patient said that she has a neurologist in town that she sees regularly.  They are attempting to uptitrate the patient's current " "Keppra dosage as her metabolic demand has changed while being pregnant thus requiring increased dosage of the Keppra.  Prior to coming to the emergency department, she has been taking 3000 mg of Keppra in the morning and took another 3000 mg as needed during the day.  Unfortunately, that was the wrong dosage.  Per chart review, the patient is to receive 1500 mg of Keppra twice daily.  The patient called her neurologist office as she was concerned about the quantity of seizure she had over the previous 24 hours.  The patient's neurology group recommended that she be evaluated in the emergency department.     ERCourse:  Patient was loaded on Keppra and was provided antiemetic medications, fluids, and laboratory studies. Laboratory studies were reassuring.  Fetal heart tones were seen at bedside ultrasound with a rate of 143 bpm.  OB was consulted and had no concerns from the standpoint of the baby.  Neurology was also consulted and recommended observation for continued seizure activity and to load the patient with Keppra, however no other neurological needs were necessary.  As such, the ED MD provided a loading bolus of Keppra.  She was admitted for overnight IV hydration and seizure monitoring.\"     During her admission, patient was closely monitored with seizure, aspiration and fall precautions. Patient did not have any seizures nor vomiting. Her nausea was controlled with Zofran. Patient tolerated her oral intakes. Patient is discharge home in stable condition. Patient verbalized understanding her daily dosage of Keppra. Patient to follow up with her neurologist, Dr. Casillas.     DISCHARGE CONDITION:    Stable    DISPOSITION:   Home     DISCHARGE MEDICATIONS:      Medication List        CHANGE how you take these medications        Instructions   ondansetron 4 MG Tbdp  What changed: when to take this  Commonly known as: Zofran ODT   Take 1 Tablet by mouth 4 times a day as needed for Nausea/Vomiting for up to 30 " days.  Dose: 4 mg            CONTINUE taking these medications        Instructions   ARIPiprazole 10 MG Tabs  Commonly known as: Abilify   Take 10 mg by mouth every evening.  Dose: 10 mg     buPROPion 100 MG Tabs  Commonly known as: Wellbutrin   Take 100 mg by mouth 2 times a day.  Dose: 100 mg     escitalopram 10 MG Tabs  Commonly known as: Lexapro   Take 15 mg by mouth every day. 15 mg = 1.5 tablets  Dose: 15 mg     levetiracetam 750 MG tablet  Commonly known as: Keppra   Doctor's comments: Please fill ASAP. New script as she is seizing and pregnant  Take 2 Tablets by mouth 2 times a day for 360 days.  Dose: 1,500 mg     naltrexone 50 MG Tabs  Commonly known as: Depade   Take 50 mg by mouth every day.  Dose: 50 mg     QUEtiapine 100 MG Tabs  Commonly known as: SEROquel   Take 200 mg by mouth at bedtime. 200 mg = 2 tablets  Dose: 200 mg            ACTIVITY:   Normal Activity as Tolerated.    DIET:   Healthy    DISCHARGE INSTRUCTIONS AND FOLLOW UP:  Patient is medically stable for discharge and will be discharged to home    Follow Up:   Layton Casillas M.D.  [address] [place] [id number]  [phone]  Schedule an appointment as soon as possible for a visit in 1 week(s)  As needed    Discharge Instructions:   Patient was instructed to return the ER in the event of worsening symptoms or any other major concerns. Patient understands that failure to do so may indicate worsening of her medical condition(s) and result in adverse clinical outcomes including fatality. We have counseled the patient on the importance of compliance and the patient has agreed to proceed with all medical recommendations and follow up plan indicated above. The patient understands that the failure to do so may result in result in adverse clinical outcomes including fatality.       CC:   Pcp Pt States None
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
## 2023-09-13 NOTE — ED NOTES
Answered call light, assisted patient to restroom. Patient was able to ambulate slowly and used the restroom without incident. Patient stated she had not eaten all day and would like to know when she can eat and drink. Reconnected monitoring, IV and gave patient call button.

## 2023-09-13 NOTE — PROGRESS NOTES
Patient arrived to [facility]. [place] paper work, meds, and follow up appointments reviewed with patient. Questions addressed. Ride home with family.

## 2023-09-13 NOTE — PROGRESS NOTES
Monitor summary: SB-SR 58-66, FL .17, QRS .07, QT .42, with occasional PVCs and PACs per strip from monitor room.

## 2023-09-13 NOTE — ASSESSMENT & PLAN NOTE
-1-18-1 @ 19+6 by LMP. EYAD 2024 (LMP= 5w US).  Patient established with a high risk pregnancy clinic.  Her provider's name is Jen.  - Continue folic acid and prenatal vitamins  - Daily fetal heart tones
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
## 2023-09-13 NOTE — CARE PLAN
Problem: Knowledge Deficit - Standard  Goal: Patient and family/care givers will demonstrate understanding of plan of care, disease process/condition, diagnostic tests and medications  Outcome: Progressing   The patient is Stable - Low risk of patient condition declining or worsening    Shift Goals  Clinical Goals: safety  Patient Goals: rest  Family Goals: joanna    Progress made toward(s) clinical / shift goals:  fall and seizure precautions in place.    Patient is not progressing towards the following goals:

## 2023-09-13 NOTE — PROGRESS NOTES
4 Eyes Skin Assessment Completed by Luis Fernando, RN and KORY Cintron.    Head WDL  Ears WDL  Nose WDL  Mouth WDL  Neck WDL  Breast/Chest WDL  Shoulder Blades WDL  Spine WDL  (R) Arm/Elbow/Hand WDL  (L) Arm/Elbow/Hand WDL  Abdomen WDL  Groin WDL  Scrotum/Coccyx/Buttocks WDL  (R) Leg WDL  (L) Leg WDL  (R) Heel/Foot/Toe WDL  (L) Heel/Foot/Toe WDL          Devices In Places Tele Box and Pulse Ox      Interventions In Place Pillows    Possible Skin Injury No    Pictures Uploaded Into Epic N/A  Wound Consult Placed N/A  RN Wound Prevention Protocol Ordered No

## 2023-09-13 NOTE — ASSESSMENT & PLAN NOTE
The patient reported that she is on Suboxone with Dr. Delgado.  The patient has been self titrating her Suboxone, which he understands is not recommended, as she thinks her Suboxone is contributing to her nausea and vomiting.  - Zofran available as needed

## 2023-09-13 NOTE — ASSESSMENT & PLAN NOTE
The patient is prescribed Abilify, Lexapro, and Seroquel.  She has stopped taking her Abilify and Lexapro at home as she is concerned that is contributing to her seizures.  - Continue nightly Seroquel  - Hold Abilify and Lexapro pro per patient's request  -consult psych for further recommendations are pts medication regimen

## 2023-09-13 NOTE — ASSESSMENT & PLAN NOTE
-1-18-1 @ 21+1 by LMP. EYAD 2024 (LMP= 5w US).  Patient established with a high risk pregnancy clinic.  Her provider's name is Jen.  - Continue folic acid and prenatal vitamins  - Daily fetal heart tones

## 2023-09-13 NOTE — TELEPHONE ENCOUNTER
Patient Name: Zeina Davis  MRN: [id number]    Reason for the call: Patient needs a 1 week hospital follow up with Dr Casillas for Seizures. Patient is being discharged today 9- from [facility].    Best Call Back #: [phone]
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
## 2023-09-13 NOTE — DISCHARGE INSTRUCTIONS
Seizure, Adult  A seizure is a sudden burst of abnormal electrical and chemical activity in the brain. Seizures usually last from 30 seconds to 2 minutes.   What are the causes?  Common causes of this condition include:  Fever or infection.  Problems that affect the brain. These may include:  A brain or head injury.  Bleeding in the brain.  A brain tumor.  Low levels of blood sugar or salt.  Kidney problems or liver problems.  Conditions that are passed from parent to child (are inherited).  Problems with a substance, such as:  Having a reaction to a drug or a medicine.  Stopping the use of a substance all of a sudden (withdrawal).  A stroke.  Disorders that affect how you develop.  Sometimes, the cause may not be known.   What increases the risk?  Having someone in your family who has epilepsy. In this condition, seizures happen again and again over time. They have no clear cause.  Having had a tonic-clonic seizure before. This type of seizure causes you to:  Tighten the muscles of the whole body.  Lose consciousness.  Having had a head injury or strokes before.  Having had a lack of oxygen at birth.  What are the signs or symptoms?  There are many types of seizures. The symptoms vary depending on the type of seizure you have.  Symptoms during a seizure  Shaking that you cannot control (convulsions) with fast, jerky movements of muscles.  Stiffness of the body.  Breathing problems.  Feeling mixed up (confused).  Staring or not responding to sound or touch.  Head nodding.  Eyes that blink, flutter, or move fast.  Drooling, grunting, or making clicking sounds with your mouth  Losing control of when you pee or poop.  Symptoms before a seizure  Feeling afraid, nervous, or worried.  Feeling like you may vomit.  Feeling like:  You are moving when you are not.  Things around you are moving when they are not.  Feeling like you saw or heard something before (juhi trivedi).  Odd tastes or smells.  Changes in how you see. You may  see flashing lights or spots.  Symptoms after a seizure  Feeling confused.  Feeling sleepy.  Headache.  Sore muscles.  How is this treated?  If your seizure stops on its own, you will not need treatment. If your seizure lasts longer than 5 minutes, you will normally need treatment. Treatment may include:  Medicines given through an IV tube.  Avoiding things, such as medicines, that are known to cause your seizures.  Medicines to prevent seizures.  A device to prevent or control seizures.  Surgery.  A diet low in carbohydrates and high in fat (ketogenic diet).  Follow these instructions at home:  Medicines  Take over-the-counter and prescription medicines only as told by your doctor.  Avoid foods or drinks that may keep your medicine from working, such as alcohol.  Activity  Follow instructions about driving, swimming, or doing things that would be dangerous if you had another seizure. Wait until your doctor says it is safe for you to do these things.  If you live in the [place] ask your local department of [employer] when you can drive.  Get a lot of rest.  Teaching others    Teach friends and family what to do when you have a seizure. They should:  Help you get down to the ground.  Protect your head and body.  Loosen any clothing around your neck.  Turn you on your side.  Know whether or not you need emergency care.  Stay with you until you are better.  Also, tell them what not to do if you have a seizure. Tell them:  They should not hold you down.  They should not put anything in your mouth.  General instructions  Avoid anything that gives you seizures.  Keep a seizure diary. Write down:  What you remember about each seizure.  What you think caused each seizure.  Keep all follow-up visits.  Contact a doctor if:  You have another seizure or seizures. Call the doctor each time you have a seizure.  The pattern of your seizures changes.  You keep having seizures with treatment.  You have symptoms of being sick or  having an infection.  You are not able to take your medicine.  Get help right away if:  You have any of these problems:  A seizure that lasts longer than 5 minutes.  Many seizures in a row and you do not feel better between seizures.  A seizure that makes it harder to breathe.  A seizure and you can no longer speak or use part of your body.  You do not wake up right after a seizure.  You get hurt during a seizure.  You feel confused or have pain right after a seizure.  These symptoms may be an emergency. Get help right away. Call your local emergency services (911 in the [place]  Do not wait to see if the symptoms will go away.  Do not drive yourself to the hospital.  Summary  A seizure is a sudden burst of abnormal electrical and chemical activity in the brain. Seizures normally last from 30 seconds to 2 minutes.  Causes of seizures include illness, injury to the head, low levels of blood sugar or salt, and certain conditions.  Most seizures will stop on their own in less than 5 minutes. Seizures that last longer than 5 minutes are a medical emergency and need treatment right away.  Many medicines are used to treat seizures. Take over-the-counter and prescription medicines only as told by your doctor.  This information is not intended to replace advice given to you by your health care provider. Make sure you discuss any questions you have with your health care provider.  Document Revised: 06/25/2021 Document Reviewed: 06/25/2021  [employer] Patient Education © 2023 [employer]

## 2023-09-14 NOTE — DOCUMENTATION QUERY
[facility]                                                                       Query Response Note      PATIENT:               REHANA DUVALL  ACCT #:                  [id number]  MRN:                     [id number]  :                      1995  ADMIT DATE:       2023 2:44 PM  DISCH DATE:        2023 4:26 PM  RESPONDING  PROVIDER #:        [id number]           QUERY TEXT:    Please clarify the clinical/diagnostic findings of unspecified type of bipolar      The patient's clinical indicators include:  - Findings:  Bipolar disorder per H&P and progress notes.     - Treatments:  nightly reroquel, hold abilify and lexapro, consult psych for medication regimen     - Risk factors:  heroin abuse, epilepsy, pregnancy, anxiety    Thank You,  Caryn Flynn RN  Clinical Documentation   [email or web address]  Connect via [employer]  Options provided:   -- Bipolar II   -- Major depressive disorder, recurrent   -- Major depressive disorder, recurrent, in remission   -- Other type of Bipolar, Please specify other type of bipolar   -- Other explanation, Please specify other explanation      Query created by: Caryn Flynn on 2023 8:42 AM    RESPONSE TEXT:    Bipolar II          Electronically signed by:  JHONNY GO MD 2023 8:20 AM

## 2023-09-27 NOTE — PROGRESS NOTES
"[facility]  Urgent follow up    Patient name: Zeina Davis  YOB: 1995  MRN: [id number]  Date of visit: 9/27/2023       HPI:    Zeina Davis is a 28 y.o. 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 She is a patient of Dr. Casillas.     Last seen 6/2/23, established care here in 2021. She had previously been lost to follow up and has not undergone MRI brain epilepsy protocol or 48-72 hour ambulatory EEG.     Per initial note 4/8/21 by Dr. Casillas:  \"Seizures: weird tase, clammy, hands and feet become clonic, lose awareness, has GTCs. As been hospitalized. Post-ictal confusion incoordination and weakness, 15-30 minutes post ictal. Random times per day. Triggers: not eating, occurs in high anxiety situations, poor sleep. Was occurring once per month up untl she got pregnant 10 months ago. Hasn't had anything in 10 month     Boyfriend thinks he may be \"having absence\" seizure but she thinks she just spaces out.     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      Started when 16. Occurring once per month.\"        Interval history:  She is unaccompanied to the visit.  She is currently 23 weeks pregnant.  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  She has a 2-1/2-year-old son.      She was admitted for breakthrough seizures in the setting of pregnancy 9/12-9/13.  High risk OB had recommended increasing her Keppra to 2000 mg twice daily while she was awaiting to see neurology.  A Keppra level was ordered at the last visit, however it seems it was not completed.    We briefly reviewed her typical seizure semiology.  As described in previous notes, \"weird tase, clammy, hands and feet become clonic, lose awareness, has GTCs. As " "been hospitalized. Post-ictal confusion incoordination and weakness, 15-30 minutes post ictal.\" Random times per day. Jaw locked. 7/10 times she is aware they are occurring.      She states she is trying to get her medical marijuana card.  She is out of [facility] on bond and needs to have a  approve a medical recommendation for it.  She states that Dr. Casillas had been in support of this, though I do not see this mentioned in the chart.  She states that when she is using medical marijuana, she is seizure-free.    She reports that she is currently having seizure-like episodes 5-10 times per day.  She has not undergone the previously recommended ambulatory EEG or MRI.    Medications were reviewed and updated.  She is no longer taking Wellbutrin, naltrexone, Abilify, Lexapro.  She had discussed stopping these with her psychiatrist, Dr. Fagan at [facility].  She had been taking Wellbutrin for years    She also reports having a daily headache that is holocranial, which is different from her usual migraines which are only 1 spot.  She used to take Maxalt when she was younger.  Her last migraine was at age 21.                Past Medical History:   Past Medical History:   Diagnosis Date    Anxiety     Bacterial vaginosis     Depression     Epilepsy (HCC)     Fx clavicle right    Hepatitis C     Migraine     PID (pelvic inflammatory disease)     Psychiatric disorder     bipoal - adhd    Substance abuse (HCC)        Past Surgical History:   Past Surgical History:   Procedure Laterality Date    GYN SURGERY      ovarian cysts    OTHER      toncils, adenoids, appendix    OTHER ABDOMINAL SURGERY      telescoping intestines    TONSILLECTOMY         Social History:   Social History     Socioeconomic History    Marital status: Single     Spouse name: Not on file    Number of children: Not on file    Years of education: Not on file    Highest education level: Not on file   Occupational History    Not on file   Tobacco Use    Smoking " status: Every Day     Current packs/day: 0.50     Average packs/day: 0.5 packs/day for 8.0 years (4.0 ttl pk-yrs)     Types: Cigarettes    Smokeless tobacco: Never   Vaping Use    Vaping Use: Every day    Substances: Nicotine, Flavoring   Substance and Sexual Activity    Alcohol use: Not Currently     Comment: occasional    Drug use: Not Currently     Types: Inhaled     Comment: marijuana    Sexual activity: Not on file   Other Topics Concern    Not on file   Social History Narrative    Not on file     Social Determinants of Health     Financial Resource Strain: Not on file   Food Insecurity: Not on file   Transportation Needs: Not on file   Physical Activity: Not on file   Stress: Not on file   Social Connections: Not on file   Intimate Partner Violence: Not on file   Housing Stability: Not on file       Family Hx:   Family History   Problem Relation Age of Onset    Bipolar disorder Mother     Depression Mother     Genitourinary () Problems Mother     Heart Attack Mother     Recurrent UTI's Mother     Sexual abuse Mother     Stroke Mother     Alcohol/Drug Father     Anxiety disorder Father     Bipolar disorder Father     Depression Father     Paranoid behavior Father     Psychiatric Illness Father        Current Medications:   Current Outpatient Medications:     Prenatal Vit-Fe Fumarate-FA (MULTI PRENATAL) 27-0.8 MG Tab, Take 1 Tablet by mouth every day., Disp: , Rfl:     Buprenorphine HCl-Naloxone HCl 8-2 MG FILM, DISSOLVE 1 FILM UNDER THE TONGUE ONCE A DAY 14 DAYS, Disp: , Rfl:     levetiracetam (KEPPRA) 750 MG tablet, Take 2.5 Tablets by mouth 2 times a day for 360 days., Disp: 360 Tablet, Rfl: 3    QUEtiapine (SEROQUEL) 100 MG Tab, Take 200 mg by mouth at bedtime. 200 mg = 2 tablets, Disp: , Rfl:     Allergies:   Allergies   Allergen Reactions    Other Drug      PT STATES SHE DOESN'T TOLERATE THE SEIZURE MEDS AND HAS TOO MANY SIDE EFFECTS    Latex     Morphine Unspecified     Pt reports cardiac arrest          Physical Exam:   Ambulatory Vitals  Vitals:    09/28/23 0714   BP: 100/60   Pulse: 85   Temp: 36.6 °C (97.9 °F)   SpO2: 97%     Constitutional: Well-developed, well-nourished, good hygiene. Appears stated age.  Respiratory: normal respiratory effort  Skin: Warm, dry, intact. No rashes observed.  Neurologic:   Mental Status: Awake, alert, oriented x 4.   Speech: Fluent with normal prosody.   Memory: Able to recall recent and remote events accurately.    Concentration: Attentive. Able to focus on history and follow multi-step commands.   Fund of Knowledge: Appropriate.   Cranial Nerves:    CN II: PERRL, visual fields full    CN III, IV, VI: EOMI without nystagmus    CN V: Facial sensation intact and symmetric in all 3 trigeminal distributions    CN VII: No facial asymmetry    CN VIII: Hearing intact to voice    CN IX and X: Palate elevates symmetrically, gag reflex not tested    CN XI: Symmetric shoulder shrug     CN XII: Tongue midline   Motor: 5/5 in upper and lower extremities bilaterally   Sensory: Intact and equal to light touch diffusely    Coordination: No evidence of past-pointing on finger to nose testing, no dysdiadochokinesia. Heel to shin intact.    Gait: ambulates steadily without assistive device. Romberg negative. Able to perform tandem gait.    Movements: No resting tremors or abnormal movements observed.     Studies:      Labs reviewed      Imaging:     EEG Results: none      Assessment/Plan:   Zeina Davis is a 28 y.o. woman with a history of epilepsy versus psychogenic nonepileptic seizures.  She is being seen in urgent follow-up, is a patient of Dr. Casillas.  She had been in the hospital after having multiple breakthrough seizures.     She reports having 7-10 episodes per day.  She does not have an EEG for review.  I have reordered the ambulatory EEG, not done for unclear reasons, which should capture the events and hopefully establish a diagnosis.  She should continue Keppra and  have a level checked to ensure that it is therapeutic.  It was recently increased to 2 g twice daily by OB/GYN, which is indicated as levels typically followed during pregnancy and due to her reported increase in seizures.    She states that she is seizure-free with medical marijuana, though needs medical clearance from a physician to obtain this, and that Dr. Casillas had discussed potentially doing this.  This is not mentioned in the record, however I recommended that she reach out to him to discuss further.  My recommendation is that we need to better understand what is causing the seizures with ideally capturing an event on EEG.  There is a high likelihood that these are psychogenic nonepileptic seizures, for which treatment includes addressing underlying psychiatric conditions.  She is currently followed by a psychiatrist.  The differences between PNES and epilepsy have been discussed with her at length in previous visits and she is aware of the symptoms and treatment of both entities.  In the meantime he must continue antiepileptic medication at a therapeutic dose.  I would not recommend adding additional antiepileptic medications which can have side effects to developing fetus until there is more clear evidence that these are epileptic in etiology.    She is also reporting a progressively worsening headache over the last month or so.  Given her history of multiple miscarriages, current state of pregnancy, and headache character, we need to rule out cerebral venous sinus thrombosis.  She is open to having an MRI of the brain and an MR venogram to rule out this possibility, and I have ordered these tests.    She should have more frequent follow-up with neurology, plan for follow-up in 4 to 6 weeks with myself if she is unable to get in to see Dr. Casillas.     Alice Hickey M.D.   Diplomate, Neurology with Special Qualification in Epilepsy, American Board of Psychiatry and Neurology   of  Clinical Neurology, [facility]  Level III Epilepsy Center, Department of Neurology at [facility]   9/27/2023      During today's encounter we discussed available treatment options and their individual side effect profiles. Total encounter time caring for patient today 45 minutes.

## 2023-09-28 ENCOUNTER — TELEPHONE (OUTPATIENT)
Dept: NEUROLOGY | Facility: MEDICAL CENTER | Age: 28
End: 2023-09-28

## 2023-09-28 ENCOUNTER — OFFICE VISIT (OUTPATIENT)
Dept: NEUROLOGY | Facility: MEDICAL CENTER | Age: 28
End: 2023-09-28
Attending: STUDENT IN AN ORGANIZED HEALTH CARE EDUCATION/TRAINING PROGRAM
Payer: MEDICAID

## 2023-09-28 VITALS
SYSTOLIC BLOOD PRESSURE: 100 MMHG | HEART RATE: 85 BPM | BODY MASS INDEX: 29.53 KG/M2 | DIASTOLIC BLOOD PRESSURE: 60 MMHG | TEMPERATURE: 97.9 F | OXYGEN SATURATION: 97 % | WEIGHT: 182.98 LBS

## 2023-09-28 DIAGNOSIS — R51.9 ACUTE NONINTRACTABLE HEADACHE, UNSPECIFIED HEADACHE TYPE: ICD-10-CM

## 2023-09-28 DIAGNOSIS — G40.919 BREAKTHROUGH SEIZURE (HCC): ICD-10-CM

## 2023-09-28 DIAGNOSIS — G40.909 SEIZURE DISORDER (HCC): ICD-10-CM

## 2023-09-28 DIAGNOSIS — Z3A.23 23 WEEKS GESTATION OF PREGNANCY: ICD-10-CM

## 2023-09-28 DIAGNOSIS — O09.92 HIGH-RISK PREGNANCY IN SECOND TRIMESTER: ICD-10-CM

## 2023-09-28 PROCEDURE — 99215 OFFICE O/P EST HI 40 MIN: CPT | Performed by: STUDENT IN AN ORGANIZED HEALTH CARE EDUCATION/TRAINING PROGRAM

## 2023-09-28 PROCEDURE — 3078F DIAST BP <80 MM HG: CPT | Performed by: STUDENT IN AN ORGANIZED HEALTH CARE EDUCATION/TRAINING PROGRAM

## 2023-09-28 PROCEDURE — 3074F SYST BP LT 130 MM HG: CPT | Performed by: STUDENT IN AN ORGANIZED HEALTH CARE EDUCATION/TRAINING PROGRAM

## 2023-09-28 PROCEDURE — 99212 OFFICE O/P EST SF 10 MIN: CPT | Performed by: STUDENT IN AN ORGANIZED HEALTH CARE EDUCATION/TRAINING PROGRAM

## 2023-09-28 RX ORDER — ONDANSETRON 4 MG/1
4 TABLET, FILM COATED ORAL DAILY
COMMUNITY
Start: 2023-07-05 | End: 2023-09-28

## 2023-09-28 RX ORDER — DOXYLAMINE SUCCINATE AND PYRIDOXINE HYDROCHLORIDE, DELAYED RELEASE TABLETS 10 MG/10 MG 10; 10 MG/1; MG/1
1 TABLET, DELAYED RELEASE ORAL 3 TIMES DAILY
COMMUNITY
Start: 2023-07-19 | End: 2023-09-28

## 2023-09-28 RX ORDER — BUPRENORPHINE AND NALOXONE 8; 2 MG/1; MG/1
FILM, SOLUBLE BUCCAL; SUBLINGUAL
Status: ON HOLD | COMMUNITY
Start: 2023-09-22 | End: 2024-01-19

## 2023-09-28 RX ORDER — MULTIVIT,IRON,MINERALS/LUTEIN
1 TABLET ORAL DAILY
Status: ON HOLD | COMMUNITY
Start: 2023-09-08 | End: 2024-01-19

## 2023-09-28 RX ORDER — LEVETIRACETAM 750 MG/1
2000 TABLET ORAL 2 TIMES DAILY
Qty: 360 TABLET | Refills: 3 | OUTPATIENT
Start: 2023-09-28 | End: 2023-11-07 | Stop reason: SDUPTHER

## 2023-09-28 RX ORDER — METRONIDAZOLE 500 MG/1
500 TABLET ORAL 2 TIMES DAILY
COMMUNITY
Start: 2023-08-18 | End: 2023-09-28

## 2023-09-28 ASSESSMENT — FIBROSIS 4 INDEX: FIB4 SCORE: 0.59

## 2023-09-28 NOTE — PROGRESS NOTES
Was on wellbutrin when having sz. On for years.       Dr. Fagan psychiatrist at [facility]    23 weeks    Keppra 2g BID now - high risk pregnancy increased.     Seizures: weird tase, clammy, hands and feet become clonic, lose awareness, has GTCs. As been hospitalized. Post-ictal confusion incoordination and weakness, 15-30 minutes post ictal. Random times per day. Jaw locked.   7/10 times knwo theyre happening.     Photophobia  Daily headache     Maxalt when younger. Started a month ago. Last migraine at age 21  4 years sober    Holocranial.  Usually only one spot. Whole head.     5-10 times per day

## 2023-09-28 NOTE — TELEPHONE ENCOUNTER
Prior Authorization for Levetiracetam (Keppra) 750 MG Tabs  (Quantity: 450, Days: 90) has been submitted via [facility] Key: [id number]    Insurance: [place] Medicaid [employer]    Will follow up in 24-48 business hours.

## 2023-10-03 ENCOUNTER — TELEPHONE (OUTPATIENT)
Dept: NEUROLOGY | Facility: MEDICAL CENTER | Age: 28
End: 2023-10-03
Payer: MEDICAID

## 2023-10-03 NOTE — TELEPHONE ENCOUNTER
Prior Authorization for Levetiracetam (Keppra) 750 MG Tabs has been approved for a quantity of 450 , day supply 90    Prior Authorization reference number: [id number]/A  Insurance: [facility]   Effective dates: 09/27/2023 - 09/28/2024  Copay: $0.00     Is patient eligible to fill with [facility]? Yes    Next Steps: The Patients copay is less than $5.00. Will contact the patient to determine choice of pharmacy, if applicable.

## 2023-10-05 ENCOUNTER — TELEPHONE (OUTPATIENT)
Dept: NEUROLOGY | Facility: MEDICAL CENTER | Age: 28
End: 2023-10-05
Payer: MEDICAID

## 2023-10-05 NOTE — TELEPHONE ENCOUNTER
Pt called the office today and talked to the  today about receiving a letter for an upcoming Doctors appointment on Monday. She stated that she needs a letter stating her epilepsy Dx. She stated that she needs this by Monday. Please advise! Thank you!

## 2023-10-09 ENCOUNTER — HOSPITAL ENCOUNTER (EMERGENCY)
Facility: MEDICAL CENTER | Age: 28
End: 2023-10-09
Attending: OBSTETRICS & GYNECOLOGY | Admitting: OBSTETRICS & GYNECOLOGY
Payer: MEDICAID

## 2023-10-09 ENCOUNTER — APPOINTMENT (OUTPATIENT)
Dept: RADIOLOGY | Facility: MEDICAL CENTER | Age: 28
End: 2023-10-09
Attending: OBSTETRICS & GYNECOLOGY
Payer: MEDICAID

## 2023-10-09 VITALS
RESPIRATION RATE: 20 BRPM | WEIGHT: 180 LBS | SYSTOLIC BLOOD PRESSURE: 127 MMHG | TEMPERATURE: 96.8 F | BODY MASS INDEX: 28.93 KG/M2 | DIASTOLIC BLOOD PRESSURE: 60 MMHG | HEIGHT: 66 IN | HEART RATE: 68 BPM

## 2023-10-09 DIAGNOSIS — R11.2 NAUSEA AND VOMITING, UNSPECIFIED VOMITING TYPE: ICD-10-CM

## 2023-10-09 DIAGNOSIS — R11.0 NAUSEA: ICD-10-CM

## 2023-10-09 LAB
ALBUMIN SERPL BCP-MCNC: 4.1 G/DL (ref 3.2–4.9)
ALBUMIN/GLOB SERPL: 1.3 G/DL
ALP SERPL-CCNC: 65 U/L (ref 30–99)
ALT SERPL-CCNC: 9 U/L (ref 2–50)
AMPHET UR QL SCN: NEGATIVE
ANION GAP SERPL CALC-SCNC: 13 MMOL/L (ref 7–16)
APPEARANCE UR: ABNORMAL
AST SERPL-CCNC: 15 U/L (ref 12–45)
BACTERIA #/AREA URNS HPF: ABNORMAL /HPF
BARBITURATES UR QL SCN: NEGATIVE
BASOPHILS # BLD AUTO: 0.1 % (ref 0–1.8)
BASOPHILS # BLD: 0.02 K/UL (ref 0–0.12)
BENZODIAZ UR QL SCN: NEGATIVE
BILIRUB SERPL-MCNC: 0.3 MG/DL (ref 0.1–1.5)
BILIRUB UR QL STRIP.AUTO: NEGATIVE
BUN SERPL-MCNC: 8 MG/DL (ref 8–22)
BZE UR QL SCN: NEGATIVE
CALCIUM ALBUM COR SERPL-MCNC: 8.4 MG/DL (ref 8.5–10.5)
CALCIUM SERPL-MCNC: 8.5 MG/DL (ref 8.5–10.5)
CANNABINOIDS UR QL SCN: NEGATIVE
CHLORIDE SERPL-SCNC: 105 MMOL/L (ref 96–112)
CO2 SERPL-SCNC: 17 MMOL/L (ref 20–33)
COLOR UR: YELLOW
CREAT SERPL-MCNC: 0.46 MG/DL (ref 0.5–1.4)
CREAT UR-MCNC: 159.48 MG/DL
EOSINOPHIL # BLD AUTO: 0.09 K/UL (ref 0–0.51)
EOSINOPHIL NFR BLD: 0.6 % (ref 0–6.9)
EPI CELLS #/AREA URNS HPF: ABNORMAL /HPF
ERYTHROCYTE [DISTWIDTH] IN BLOOD BY AUTOMATED COUNT: 40.5 FL (ref 35.9–50)
FENTANYL UR QL: NEGATIVE
FLUAV RNA SPEC QL NAA+PROBE: NEGATIVE
FLUBV RNA SPEC QL NAA+PROBE: NEGATIVE
GFR SERPLBLD CREATININE-BSD FMLA CKD-EPI: 133 ML/MIN/1.73 M 2
GLOBULIN SER CALC-MCNC: 3.1 G/DL (ref 1.9–3.5)
GLUCOSE SERPL-MCNC: 101 MG/DL (ref 65–99)
GLUCOSE UR STRIP.AUTO-MCNC: NEGATIVE MG/DL
HCT VFR BLD AUTO: 39.2 % (ref 37–47)
HGB BLD-MCNC: 13.7 G/DL (ref 12–16)
HYALINE CASTS #/AREA URNS LPF: ABNORMAL /LPF
IMM GRANULOCYTES # BLD AUTO: 0.08 K/UL (ref 0–0.11)
IMM GRANULOCYTES NFR BLD AUTO: 0.5 % (ref 0–0.9)
KETONES UR STRIP.AUTO-MCNC: ABNORMAL MG/DL
LEUKOCYTE ESTERASE UR QL STRIP.AUTO: NEGATIVE
LIPASE SERPL-CCNC: 22 U/L (ref 11–82)
LYMPHOCYTES # BLD AUTO: 2.16 K/UL (ref 1–4.8)
LYMPHOCYTES NFR BLD: 14.3 % (ref 22–41)
MCH RBC QN AUTO: 31.8 PG (ref 27–33)
MCHC RBC AUTO-ENTMCNC: 34.9 G/DL (ref 32.2–35.5)
MCV RBC AUTO: 91 FL (ref 81.4–97.8)
METHADONE UR QL SCN: NEGATIVE
MICRO URNS: ABNORMAL
MONOCYTES # BLD AUTO: 0.56 K/UL (ref 0–0.85)
MONOCYTES NFR BLD AUTO: 3.7 % (ref 0–13.4)
NEUTROPHILS # BLD AUTO: 12.2 K/UL (ref 1.82–7.42)
NEUTROPHILS NFR BLD: 80.8 % (ref 44–72)
NITRITE UR QL STRIP.AUTO: NEGATIVE
NRBC # BLD AUTO: 0 K/UL
NRBC BLD-RTO: 0 /100 WBC (ref 0–0.2)
OPIATES UR QL SCN: NEGATIVE
OXYCODONE UR QL SCN: NEGATIVE
PCP UR QL SCN: NEGATIVE
PH UR STRIP.AUTO: 6 [PH] (ref 5–8)
PLATELET # BLD AUTO: 290 K/UL (ref 164–446)
PMV BLD AUTO: 9.7 FL (ref 9–12.9)
POTASSIUM SERPL-SCNC: 4.2 MMOL/L (ref 3.6–5.5)
PROPOXYPH UR QL SCN: NEGATIVE
PROT SERPL-MCNC: 7.2 G/DL (ref 6–8.2)
PROT UR QL STRIP: NEGATIVE MG/DL
PROT UR-MCNC: 27 MG/DL (ref 0–15)
PROT/CREAT UR: 169 MG/G (ref 10–107)
RBC # BLD AUTO: 4.31 M/UL (ref 4.2–5.4)
RBC # URNS HPF: ABNORMAL /HPF
RBC UR QL AUTO: NEGATIVE
RSV RNA SPEC QL NAA+PROBE: NEGATIVE
SARS-COV-2 RNA RESP QL NAA+PROBE: NOTDETECTED
SODIUM SERPL-SCNC: 135 MMOL/L (ref 135–145)
SP GR UR STRIP.AUTO: 1.02
SPECIMEN SOURCE: NORMAL
UROBILINOGEN UR STRIP.AUTO-MCNC: 0.2 MG/DL
WBC # BLD AUTO: 15.1 K/UL (ref 4.8–10.8)
WBC #/AREA URNS HPF: ABNORMAL /HPF

## 2023-10-09 PROCEDURE — 96411 CHEMO IV PUSH ADDL DRUG: CPT

## 2023-10-09 PROCEDURE — 85025 COMPLETE CBC W/AUTO DIFF WBC: CPT

## 2023-10-09 PROCEDURE — 99284 EMERGENCY DEPT VISIT MOD MDM: CPT

## 2023-10-09 PROCEDURE — 81001 URINALYSIS AUTO W/SCOPE: CPT | Mod: XU

## 2023-10-09 PROCEDURE — 96409 CHEMO IV PUSH SNGL DRUG: CPT

## 2023-10-09 PROCEDURE — 700105 HCHG RX REV CODE 258: Mod: UD | Performed by: OBSTETRICS & GYNECOLOGY

## 2023-10-09 PROCEDURE — 76815 OB US LIMITED FETUS(S): CPT

## 2023-10-09 PROCEDURE — A9270 NON-COVERED ITEM OR SERVICE: HCPCS | Performed by: OBSTETRICS & GYNECOLOGY

## 2023-10-09 PROCEDURE — C9803 HOPD COVID-19 SPEC COLLECT: HCPCS | Performed by: OBSTETRICS & GYNECOLOGY

## 2023-10-09 PROCEDURE — 36415 COLL VENOUS BLD VENIPUNCTURE: CPT

## 2023-10-09 PROCEDURE — 0241U HCHG SARS-COV-2 COVID-19 NFCT DS RESP RNA 4 TRGT MIC: CPT

## 2023-10-09 PROCEDURE — 80307 DRUG TEST PRSMV CHEM ANLYZR: CPT

## 2023-10-09 PROCEDURE — 700102 HCHG RX REV CODE 250 W/ 637 OVERRIDE(OP): Performed by: OBSTETRICS & GYNECOLOGY

## 2023-10-09 PROCEDURE — 82570 ASSAY OF URINE CREATININE: CPT | Mod: XU

## 2023-10-09 PROCEDURE — 700111 HCHG RX REV CODE 636 W/ 250 OVERRIDE (IP): Mod: JZ,UD | Performed by: OBSTETRICS & GYNECOLOGY

## 2023-10-09 PROCEDURE — 83690 ASSAY OF LIPASE: CPT

## 2023-10-09 PROCEDURE — 80053 COMPREHEN METABOLIC PANEL: CPT

## 2023-10-09 PROCEDURE — 96374 THER/PROPH/DIAG INJ IV PUSH: CPT

## 2023-10-09 PROCEDURE — 76705 ECHO EXAM OF ABDOMEN: CPT

## 2023-10-09 PROCEDURE — 96375 TX/PRO/DX INJ NEW DRUG ADDON: CPT

## 2023-10-09 PROCEDURE — 87086 URINE CULTURE/COLONY COUNT: CPT

## 2023-10-09 PROCEDURE — 84156 ASSAY OF PROTEIN URINE: CPT | Mod: XU

## 2023-10-09 RX ORDER — AMOXICILLIN 250 MG
1 CAPSULE ORAL DAILY
Status: DISCONTINUED | OUTPATIENT
Start: 2023-10-10 | End: 2023-10-09 | Stop reason: HOSPADM

## 2023-10-09 RX ORDER — METOCLOPRAMIDE 10 MG/1
10 TABLET ORAL
Status: DISCONTINUED | OUTPATIENT
Start: 2023-10-09 | End: 2023-10-09 | Stop reason: HOSPADM

## 2023-10-09 RX ORDER — SENNA AND DOCUSATE SODIUM 50; 8.6 MG/1; MG/1
1 TABLET, FILM COATED ORAL 2 TIMES DAILY
Qty: 60 TABLET | Refills: 1 | Status: SHIPPED | OUTPATIENT
Start: 2023-10-09 | End: 2023-11-07

## 2023-10-09 RX ORDER — SODIUM CHLORIDE, SODIUM LACTATE, POTASSIUM CHLORIDE, AND CALCIUM CHLORIDE .6; .31; .03; .02 G/100ML; G/100ML; G/100ML; G/100ML
1000 INJECTION, SOLUTION INTRAVENOUS ONCE
Status: COMPLETED | OUTPATIENT
Start: 2023-10-09 | End: 2023-10-09

## 2023-10-09 RX ORDER — SIMETHICONE 125 MG
125 TABLET,CHEWABLE ORAL 3 TIMES DAILY PRN
Status: DISCONTINUED | OUTPATIENT
Start: 2023-10-09 | End: 2023-10-09 | Stop reason: HOSPADM

## 2023-10-09 RX ORDER — SIMETHICONE 125 MG
250 TABLET,CHEWABLE ORAL 3 TIMES DAILY PRN
Status: DISCONTINUED | OUTPATIENT
Start: 2023-10-09 | End: 2023-10-09

## 2023-10-09 RX ORDER — ONDANSETRON 4 MG/1
4 TABLET, ORALLY DISINTEGRATING ORAL EVERY 6 HOURS PRN
Qty: 10 TABLET | Refills: 0 | Status: ON HOLD | OUTPATIENT
Start: 2023-10-09 | End: 2024-01-19

## 2023-10-09 RX ORDER — SIMETHICONE 40MG/0.6ML
40 SUSPENSION, DROPS(FINAL DOSAGE FORM)(ML) ORAL 4 TIMES DAILY PRN
Qty: 120 ML | Refills: 3 | Status: SHIPPED | OUTPATIENT
Start: 2023-10-09 | End: 2023-11-07

## 2023-10-09 RX ORDER — ONDANSETRON 4 MG/1
4 TABLET, ORALLY DISINTEGRATING ORAL EVERY 4 HOURS PRN
Status: DISCONTINUED | OUTPATIENT
Start: 2023-10-09 | End: 2023-10-09 | Stop reason: HOSPADM

## 2023-10-09 RX ORDER — METOCLOPRAMIDE HYDROCHLORIDE 5 MG/ML
10 INJECTION INTRAMUSCULAR; INTRAVENOUS EVERY 6 HOURS PRN
Status: DISCONTINUED | OUTPATIENT
Start: 2023-10-09 | End: 2023-10-09

## 2023-10-09 RX ORDER — ONDANSETRON 2 MG/ML
4 INJECTION INTRAMUSCULAR; INTRAVENOUS EVERY 4 HOURS PRN
Status: DISCONTINUED | OUTPATIENT
Start: 2023-10-09 | End: 2023-10-09 | Stop reason: HOSPADM

## 2023-10-09 RX ORDER — METOCLOPRAMIDE 10 MG/1
10 TABLET ORAL 4 TIMES DAILY
Qty: 120 TABLET | Refills: 1 | Status: ON HOLD | OUTPATIENT
Start: 2023-10-09 | End: 2024-01-19

## 2023-10-09 RX ADMIN — ONDANSETRON 4 MG: 2 INJECTION INTRAMUSCULAR; INTRAVENOUS at 13:59

## 2023-10-09 RX ADMIN — METOCLOPRAMIDE 10 MG: 5 INJECTION, SOLUTION INTRAMUSCULAR; INTRAVENOUS at 15:57

## 2023-10-09 RX ADMIN — SIMETHICONE 250 MG: 125 TABLET, CHEWABLE ORAL at 16:18

## 2023-10-09 RX ADMIN — SODIUM CHLORIDE, POTASSIUM CHLORIDE, SODIUM LACTATE AND CALCIUM CHLORIDE 1000 ML: 600; 310; 30; 20 INJECTION, SOLUTION INTRAVENOUS at 14:00

## 2023-10-09 RX ADMIN — LIDOCAINE HYDROCHLORIDE 30 ML: 20 SOLUTION OROPHARYNGEAL at 12:57

## 2023-10-09 ASSESSMENT — FIBROSIS 4 INDEX: FIB4 SCORE: 0.59

## 2023-10-09 ASSESSMENT — PAIN SCALES - GENERAL: PAINLEVEL: 8

## 2023-10-09 NOTE — PROGRESS NOTES
EDC 2024 EGA 24.4    1207- Pt presented to L&D via [facility] for c/o severe abdominal pain and rectal pressure, pt denies vaginal bleeding, LOF, states + fetal movement. EFM and TOCO applied. Pt states she has not had a bowel movement x 1 week, abdomen tender upon palpation, especially right upper quadrant and left lower quadrant. Pt vomiting- has been since last night. Dr. Li notified. Orders for renal u/s, abdominal u/s, OB limited, IV hydration, CBC, CMP, protein creatine, lipase, UA, and drug screen, zofran, reglan, GI cocktail received.     1248- Pt c/o pressure, VE closed.

## 2023-10-10 NOTE — PROGRESS NOTES
Dr. Li in to see pt. Orders to discharge pt home received. Pre term labor precautions. Pt verbalized and understanding. Pt discharged home.

## 2023-10-10 NOTE — ED PROVIDER NOTES
"IUP at 24w4d.  Abdominal pain  Rectal pressure.  Right upper quadrant and left lower quadrant pain.  Nausea/vomiting.  Constipation.  Gas pain.    S:  Patient seen and evaluated in labor and delivery triage for the above issues.  Extensive work up performed including lab studies and u/s imaging of abdomen/kidneys/fetus/placenta.  Cervix is closed.  Upon examination, RN noted large amount of stool in the rectum.  Patient was informed that she has constipation and I recommend stool softeners and simethicone as well as ODT Zofran and Reglan.  Questions answered.  Follow up as scheduled.    O: /60   Pulse 68   Temp 36 °C (96.8 °F) (Temporal)   Resp 20   Ht 1.676 m (5' 6\")   Wt 81.6 kg (180 lb)     A, A, and O x NAD    SVE: per RN closed/thick/high    TOCO: quiet.    EFM: reassuring for gestational age.     Latest Reference Range & Units Most Recent   WBC 4.8 - 10.8 K/uL 15.1 (H)  10/9/23 13:44   RBC 4.20 - 5.40 M/uL 4.31  10/9/23 13:44   Hemoglobin 12.0 - 16.0 g/dL 13.7  10/9/23 13:44   Hematocrit 37.0 - 47.0 % 39.2  10/9/23 13:44   MCV 81.4 - 97.8 fL 91.0  10/9/23 13:44   MCH 27.0 - 33.0 pg 31.8  10/9/23 13:44   MCHC 32.2 - 35.5 g/dL 34.9  10/9/23 13:44   RDW 35.9 - 50.0 fL 40.5  10/9/23 13:44   Platelet Count 164 - 446 K/uL 290  10/9/23 13:44   MPV 9.0 - 12.9 fL 9.7  10/9/23 13:44   Neutrophils-Polys 44.00 - 72.00 % 80.80 (H)  10/9/23 13:44   Neutrophils (Absolute) 1.82 - 7.42 K/uL 12.20 (H)  10/9/23 13:44   Lymphocytes 22.00 - 41.00 % 14.30 (L)  10/9/23 13:44   Lymphs (Absolute) 1.00 - 4.80 K/uL 2.16  10/9/23 13:44   Monocytes 0.00 - 13.40 % 3.70  10/9/23 13:44   Monos (Absolute) 0.00 - 0.85 K/uL 0.56  10/9/23 13:44   Eosinophils 0.00 - 6.90 % 0.60  10/9/23 13:44   Eos (Absolute) 0.00 - 0.51 K/uL 0.09  10/9/23 13:44   Basophils 0.00 - 1.80 % 0.10  10/9/23 13:44   Baso (Absolute) 0.00 - 0.12 K/uL 0.02  10/9/23 13:44   Immature Granulocytes 0.00 - 0.90 % 0.50  10/9/23 13:44   Immature Granulocytes (abs) 0.00 " - 0.11 K/uL 0.08  10/9/23 13:44   Nucleated RBC 0.00 - 0.20 /100 WBC 0.00  10/9/23 13:44   NRBC (Absolute) K/uL 0.00  10/9/23 13:44   Sodium 135 - 145 mmol/L 135  10/9/23 13:44   Potassium 3.6 - 5.5 mmol/L 4.2  10/9/23 13:44   Chloride 96 - 112 mmol/L 105  10/9/23 13:44   Co2 20 - 33 mmol/L 17 (L)  10/9/23 13:44   Anion Gap 7.0 - 16.0  13.0  10/9/23 13:44   Glucose 65 - 99 mg/dL 101 (H)  10/9/23 13:44   Bun 8 - 22 mg/dL 8  10/9/23 13:44   Creatinine 0.50 - 1.40 mg/dL 0.46 (L)  10/9/23 13:44   GFR (CKD-EPI) >60 mL/min/1.73 m 2 133  10/9/23 13:44   Bun-Creatinine Ratio 10.0 - 20.0  11.4 (E)  2/6/23 18:11   Calcium 8.5 - 10.5 mg/dL 8.5  10/9/23 13:44   Correct Calcium 8.5 - 10.5 mg/dL 8.4 (L)  10/9/23 13:44   AST(SGOT) 12 - 45 U/L 15  10/9/23 13:44   ALT(SGPT) 2 - 50 U/L 9  10/9/23 13:44   Alkaline Phosphatase 30 - 99 U/L 65  10/9/23 13:44   Total Bilirubin 0.1 - 1.5 mg/dL 0.3  10/9/23 13:44   Albumin 3.2 - 4.9 g/dL 4.1  10/9/23 13:44   Total Protein 6.0 - 8.2 g/dL 7.2  10/9/23 13:44   Globulin 1.9 - 3.5 g/dL 3.1  10/9/23 13:44   A-G Ratio g/dL 1.3  10/9/23 13:44   Phosphorus 2.5 - 4.5 mg/dL 3.1  9/12/23 14:50   Magnesium 1.5 - 2.5 mg/dL 1.8  9/12/23 14:50   Lipase 11 - 82 U/L 22  10/9/23 13:44   Glom Filt Rate, Est >60 mL/min/1.7 141 (E)  2/7/23 16:47   Amphetamines Urine Negative  Negative  10/9/23 13:40   Barbiturates Negative  Negative  10/9/23 13:40   Benzodiazepines Negative  Negative  10/9/23 13:40   Cannabinoid Metab Negative  Negative  10/9/23 13:40   Cocaine Metabolite Negative  Negative  10/9/23 13:40   Methadone Negative  Negative  10/9/23 13:40   Opiates Negative  Negative  10/9/23 13:40   Oxycodone Negative  Negative  10/9/23 13:40   Phencyclidine -Pcp Negative  Negative  10/9/23 13:40   Propoxyphene Negative  Negative  10/9/23 13:40   Creatinine, Random Urine mg/dL 159.48  10/9/23 13:40   Total Protein, Urine 0.0 - 15.0 mg/dL 27.0 (H)  10/9/23 13:40   Protein Creatinine Ratio 10 - 107 mg/g 169  (H)  10/9/23 13:40   Urobilinogen, Urine Negative  0.2  10/9/23 13:40   POC Glucose, Blood 65 - 99 mg/dL 120 (H)  12/9/22 14:31   Color  Yellow  10/9/23 13:40   Character  Hazy !  10/9/23 13:40   Specific Gravity <1.035  1.025  10/9/23 13:40   Ph 5.0 - 8.0  6.0  10/9/23 13:40   Glucose Negative mg/dL Negative  10/9/23 13:40   Ketones Negative mg/dL Trace !  10/9/23 13:40   Bilirubin Negative  Negative  10/9/23 13:40   Occult Blood Negative  Negative  10/9/23 13:40   Protein Negative mg/dL Negative  10/9/23 13:40   Nitrite Negative  Negative  10/9/23 13:40   Leukocyte Esterase Negative  Negative  10/9/23 13:40   Micro Urine Req  Microscopic  10/9/23 13:40   WBC /hpf 5-10 !  10/9/23 13:40   RBC /hpf 2-5 !  10/9/23 13:40   Epithelial Cells /hpf Many !  10/9/23 13:40   Bacteria None /hpf Moderate !  10/9/23 13:40   Mucous Threads /hpf Many  12/9/22 16:10   Hyaline Cast /lpf 0-2  10/9/23 13:40   (H): Data is abnormally high  (L): Data is abnormally low  !: Data is abnormal  (E): External lab result    10/9/2023 2:14 PM     HISTORY/REASON FOR EXAM:  Pain; please check gallbladder and liver  Abdominal pain     TECHNIQUE/EXAM DESCRIPTION AND NUMBER OF VIEWS:  Real-time sonography of the liver and biliary tree.     COMPARISON: None     FINDINGS:  The liver is normal in contour. There is no evidence of solid mass lesion. The liver measures 15.30 cm.     The gallbladder is normal. There is no evidence of cholelithiasis.  The gallbladder wall thickness measures 2.20 mm. There is no pericholecystic fluid.  The common duct measures 4.90 mm.     The visualized pancreas is unremarkable.  The visualized aorta is normal in caliber.     Intrahepatic IVC is patent.     The portal vein is patent with hepatopetal flow. The MPV measures 1.44 cm.     The right kidney measures 10.27 cm.     There is no ascites.     IMPRESSION:     Unremarkable right upper quadrant ultrasound.    10/9/2023 2:14 PM     HISTORY/REASON FOR EXAM:  Pain; please  check location of placenta, fetal growth, NADER        TECHNIQUE/EXAM DESCRIPTION: OB limited ultrasound.     COMPARISON:  5/29/2023     FINDINGS:  Fetal Lie:  Vertex  LMP:  4/20/2023  Clinical EYAD by LMP:  01/25/2024     Placenta (Location):  Posterior  Placenta Previa: No  Placental Grade: 1-2     Amniotic Fluid Volume:  NADER = 14.54 cm     Fetal Heart Rate:  144 bpm     Cervical Length:  4.05 cm     Bilateral ovaries are not seen.     Umbilical Artery S/D Ratio(s):  Not performed     EGA by this US:  24 weeks, 3 days  EYAD by this US: 01/26/2024  EYAD by 1st US:  1/24/2024     Estimated Fetal Weight:  688.50 grams     Comments:     IMPRESSION:     Single intrauterine pregnancy of an estimated gestational age of 24 weeks, 3 days with an estimated date of delivery of 01/26/2024.     Fetal survey within normal limits. Please note this is not an anatomic survey.    A/P: IUP at 24w4d.  Constipation, gas pain, and nausea/vomiting.     D/C to home.  Follow up with Dr. Li as scheduled.  RX for Zofran ODT, Reglan, Simethicone, and Senna-S sent to the pharmacy.

## 2023-10-12 LAB
BACTERIA UR CULT: NORMAL
SIGNIFICANT IND 70042: NORMAL
SITE SITE: NORMAL
SOURCE SOURCE: NORMAL

## 2023-10-26 ENCOUNTER — HOSPITAL ENCOUNTER (EMERGENCY)
Facility: MEDICAL CENTER | Age: 28
End: 2023-10-26
Attending: OBSTETRICS & GYNECOLOGY | Admitting: OBSTETRICS & GYNECOLOGY
Payer: MEDICAID

## 2023-10-26 VITALS
HEIGHT: 66 IN | HEART RATE: 65 BPM | SYSTOLIC BLOOD PRESSURE: 108 MMHG | WEIGHT: 180 LBS | DIASTOLIC BLOOD PRESSURE: 58 MMHG | BODY MASS INDEX: 28.93 KG/M2 | TEMPERATURE: 97.3 F

## 2023-10-26 LAB
APPEARANCE UR: CLEAR
CANDIDA DNA VAG QL PROBE+SIG AMP: POSITIVE
COLOR UR AUTO: ABNORMAL
G VAGINALIS DNA VAG QL PROBE+SIG AMP: NEGATIVE
GLUCOSE UR QL STRIP.AUTO: NEGATIVE MG/DL
KETONES UR QL STRIP.AUTO: NEGATIVE MG/DL
LEUKOCYTE ESTERASE UR QL STRIP.AUTO: NEGATIVE
NITRITE UR QL STRIP.AUTO: NEGATIVE
PH UR STRIP.AUTO: 6.5 [PH] (ref 5–8)
PROT UR QL STRIP: 30 MG/DL
RBC UR QL AUTO: NEGATIVE
SP GR UR STRIP.AUTO: 1.02 (ref 1–1.03)
T VAGINALIS DNA VAG QL PROBE+SIG AMP: NEGATIVE

## 2023-10-26 PROCEDURE — 87510 GARDNER VAG DNA DIR PROBE: CPT

## 2023-10-26 PROCEDURE — 81002 URINALYSIS NONAUTO W/O SCOPE: CPT

## 2023-10-26 PROCEDURE — 87660 TRICHOMONAS VAGIN DIR PROBE: CPT

## 2023-10-26 PROCEDURE — 87480 CANDIDA DNA DIR PROBE: CPT

## 2023-10-26 PROCEDURE — 302449 STATCHG TRIAGE ONLY (STATISTIC)

## 2023-10-26 ASSESSMENT — FIBROSIS 4 INDEX: FIB4 SCORE: .4827586206896551724

## 2023-10-26 NOTE — PROGRESS NOTES
Pt presents to L&D with complaints of n/v. Pt ambulated to LDA 2 for assessment.     1110 TOCO and EMF applied, VSS. Pt reports +FM, denies LOF or VB but states she did have some spotting yesterday that resolved on its own. Pt states she has suffered with nausea her entire pregnancy and is taking multiple medications for it. Pt states she is able to eat small meals and keep some liquids down, however she is concerned that her baby is measuring a little small. This is a new FOB and he is shorter than her last SO. Pt is getting good PNC and follow up with [facility]. Pt reassured by FHT.     1148 Dr. Moya updated on pt status, orders for vaginal pathogens swab received. Okay to discharge pt home while test runs, RN or MD to follow up with pt.     1200 RN at bedside, pt updated on POC and all questions answered.  labor precautions given and instructions on follow up care provided.     1205 pt discharged home in stable condition.

## 2023-10-27 NOTE — PROGRESS NOTES
4976 Pt updated on test results and per MD, pt to  an OTC monistat and follow up with Dr. Li as needed.    sudden onset

## 2023-10-29 ENCOUNTER — HOSPITAL ENCOUNTER (OUTPATIENT)
Dept: RADIOLOGY | Facility: MEDICAL CENTER | Age: 28
End: 2023-10-29
Attending: STUDENT IN AN ORGANIZED HEALTH CARE EDUCATION/TRAINING PROGRAM
Payer: MEDICAID

## 2023-10-29 DIAGNOSIS — R51.9 ACUTE NONINTRACTABLE HEADACHE, UNSPECIFIED HEADACHE TYPE: ICD-10-CM

## 2023-10-30 ENCOUNTER — HOSPITAL ENCOUNTER (OUTPATIENT)
Dept: RADIOLOGY | Facility: MEDICAL CENTER | Age: 28
End: 2023-10-30
Attending: STUDENT IN AN ORGANIZED HEALTH CARE EDUCATION/TRAINING PROGRAM
Payer: MEDICAID

## 2023-10-30 DIAGNOSIS — R51.9 ACUTE NONINTRACTABLE HEADACHE, UNSPECIFIED HEADACHE TYPE: ICD-10-CM

## 2023-11-07 ENCOUNTER — TELEPHONE (OUTPATIENT)
Dept: NEUROLOGY | Facility: MEDICAL CENTER | Age: 28
End: 2023-11-07

## 2023-11-07 ENCOUNTER — OFFICE VISIT (OUTPATIENT)
Dept: NEUROLOGY | Facility: MEDICAL CENTER | Age: 28
End: 2023-11-07
Attending: STUDENT IN AN ORGANIZED HEALTH CARE EDUCATION/TRAINING PROGRAM
Payer: MEDICAID

## 2023-11-07 VITALS
OXYGEN SATURATION: 96 % | SYSTOLIC BLOOD PRESSURE: 116 MMHG | WEIGHT: 188.05 LBS | HEIGHT: 66 IN | DIASTOLIC BLOOD PRESSURE: 68 MMHG | BODY MASS INDEX: 30.22 KG/M2 | HEART RATE: 70 BPM | TEMPERATURE: 95.7 F

## 2023-11-07 DIAGNOSIS — F40.240 CLAUSTROPHOBIA: ICD-10-CM

## 2023-11-07 DIAGNOSIS — O09.92 HIGH-RISK PREGNANCY IN SECOND TRIMESTER: ICD-10-CM

## 2023-11-07 DIAGNOSIS — G40.919 BREAKTHROUGH SEIZURE (HCC): ICD-10-CM

## 2023-11-07 DIAGNOSIS — Z3A.23 23 WEEKS GESTATION OF PREGNANCY: ICD-10-CM

## 2023-11-07 DIAGNOSIS — G40.909 SEIZURE DISORDER (HCC): ICD-10-CM

## 2023-11-07 DIAGNOSIS — R51.9 BILATERAL HEADACHES: ICD-10-CM

## 2023-11-07 PROCEDURE — 99215 OFFICE O/P EST HI 40 MIN: CPT | Performed by: STUDENT IN AN ORGANIZED HEALTH CARE EDUCATION/TRAINING PROGRAM

## 2023-11-07 PROCEDURE — 3078F DIAST BP <80 MM HG: CPT | Performed by: STUDENT IN AN ORGANIZED HEALTH CARE EDUCATION/TRAINING PROGRAM

## 2023-11-07 PROCEDURE — 99212 OFFICE O/P EST SF 10 MIN: CPT | Performed by: STUDENT IN AN ORGANIZED HEALTH CARE EDUCATION/TRAINING PROGRAM

## 2023-11-07 PROCEDURE — 3074F SYST BP LT 130 MM HG: CPT | Performed by: STUDENT IN AN ORGANIZED HEALTH CARE EDUCATION/TRAINING PROGRAM

## 2023-11-07 RX ORDER — ONDANSETRON 4 MG/1
TABLET, FILM COATED ORAL
COMMUNITY
Start: 2023-10-25 | End: 2023-11-07

## 2023-11-07 RX ORDER — LEVETIRACETAM 750 MG/1
1500 TABLET ORAL 2 TIMES DAILY
Qty: 360 TABLET | Refills: 3 | Status: SHIPPED | OUTPATIENT
Start: 2023-11-07 | End: 2024-11-01

## 2023-11-07 RX ORDER — LANOLIN ALCOHOL/MO/W.PET/CERES
400 CREAM (GRAM) TOPICAL DAILY
Qty: 90 TABLET | Refills: 3 | Status: ON HOLD | OUTPATIENT
Start: 2023-11-07 | End: 2024-01-19

## 2023-11-07 RX ORDER — DIAZEPAM 5 MG/1
5 TABLET ORAL
Qty: 2 TABLET | Refills: 0 | Status: SHIPPED | OUTPATIENT
Start: 2023-11-07 | End: 2023-11-10

## 2023-11-07 ASSESSMENT — FIBROSIS 4 INDEX: FIB4 SCORE: .4827586206896551724

## 2023-11-07 NOTE — ED NOTES
RPD at bedside   Subjective:   Patient ID:  Dalia Ozuna is a 47 y.o. female who presents for evaluation of No chief complaint on file.      Fredis Ozuna is a 46 year old female patient whose current medical conditions include HTN, tobacco abuse, and obesity seen at [facility] ed for chest pain. Here today for follow up, stress test was negative Echo EF 50% chest pain was reproducible on exam, nav lE US negative for stenosis. Reports still having chest pains, and SOB. She reports yesterday she was doing laundry and felt a sharp/dull pain she also reports pain walking in the parking lot.    Smoker 2ppd  Exercises some at home    23  Here today for CV follow up, still has epigastric pains, pt describes it has a quick tightening then goes away on its own. Denies any CP with exertion or at rest. Denies any SOB at this time. BP stable in clinic today. Previous Sed, CRP and D dimer neg    Nuc stress  neg for ischemia  Echo  EF 50%        Past Medical History:   Diagnosis Date    Essential hypertension     Pneumonia        Past Surgical History:   Procedure Laterality Date     SECTION      ROTATOR CUFF REPAIR Right     ROTATOR CUFF REPAIR Right     TUBAL LIGATION         Social History     Tobacco Use    Smoking status: Every Day     Current packs/day: 2.00     Average packs/day: 2.0 packs/day for 26.3 years (52.6 ttl pk-yrs)     Types: Cigarettes     Start date: 1998    Smokeless tobacco: Never    Tobacco comments:     currently taking chantix    Substance Use Topics    Alcohol use: Not Currently    Drug use: Never       Family History   Problem Relation Age of Onset    Breast cancer Maternal Grandmother        Current Outpatient Medications on File Prior to Visit   Medication Sig Dispense Refill    albuterol (PROVENTIL/VENTOLIN HFA) 90 mcg/actuation inhaler Inhale 2 puffs into the lungs every 4 (four) hours as needed for Wheezing. 18 g 3    amLODIPine (NORVASC) 10 MG tablet Take 1 tablet (10 mg  total) by mouth once daily. 30 tablet 3    atorvastatin (LIPITOR) 40 MG tablet Take 1 tablet (40 mg total) by mouth every evening. 90 tablet 1    EScitalopram oxalate (LEXAPRO) 10 MG tablet Take 1 tablet (10 mg total) by mouth once daily. 30 tablet 3    methocarbamoL (ROBAXIN) 750 MG Tab Take 750 mg by mouth every 8 (eight) hours.      metoprolol tartrate (LOPRESSOR) 25 MG tablet Take 1 tablet (25 mg total) by mouth 2 (two) times daily. 60 tablet 2    nicotine (NICODERM CQ) 21 mg/24 hr Place 1 patch onto the skin once daily. 28 patch 1    ondansetron (ZOFRAN-ODT) 4 MG TbDL Take 1 tablet (4 mg total) by mouth every 6 (six) hours as needed (Nausea). 20 tablet 0    pantoprazole (PROTONIX) 40 MG tablet Take 1 tablet (40 mg total) by mouth once daily. 30 tablet 2    tiZANidine (ZANAFLEX) 4 MG tablet Take 1 tablet (4 mg total) by mouth every 8 (eight) hours. 90 tablet 1    [DISCONTINUED] traMADoL (ULTRAM) 50 mg tablet Take 1 tablet (50 mg total) by mouth every 8 (eight) hours as needed for Pain. (Patient not taking: Reported on 10/13/2023) 10 tablet 0     No current facility-administered medications on file prior to visit.      Wt Readings from Last 3 Encounters:   11/07/23 84.8 kg (186 lb 15.2 oz)   10/13/23 85 kg (187 lb 6.3 oz)   08/02/23 84.4 kg (186 lb)     Temp Readings from Last 3 Encounters:   08/02/23 97.3 °F (36.3 °C) (Oral)   07/26/23 98.3 °F (36.8 °C) (Oral)   06/17/22 98.1 °F (36.7 °C) (Temporal)     BP Readings from Last 3 Encounters:   11/07/23 106/72   10/13/23 112/80   08/02/23 118/74     Pulse Readings from Last 3 Encounters:   11/07/23 96   10/13/23 84   08/02/23 92        Review of Systems   Constitutional: Negative.   HENT: Negative.     Eyes: Negative.    Cardiovascular: Negative.    Respiratory: Negative.     Skin: Negative.    Musculoskeletal: Negative.    Gastrointestinal:  Positive for abdominal pain.   Genitourinary: Negative.    Neurological: Negative.    Psychiatric/Behavioral: Negative.          Objective:   Physical Exam  Vitals and nursing note reviewed.   Constitutional:       Appearance: Normal appearance.   HENT:      Head: Normocephalic.   Eyes:      Pupils: Pupils are equal, round, and reactive to light.   Cardiovascular:      Rate and Rhythm: Normal rate and regular rhythm.      Heart sounds: Normal heart sounds, S1 normal and S2 normal. No murmur heard.     No S3 or S4 sounds.   Pulmonary:      Effort: Pulmonary effort is normal.      Breath sounds: Normal breath sounds.   Abdominal:      General: Bowel sounds are normal.      Palpations: Abdomen is soft.   Musculoskeletal:         General: Normal range of motion.      Cervical back: Normal range of motion.   Skin:     Capillary Refill: Capillary refill takes less than 2 seconds.   Neurological:      General: No focal deficit present.      Mental Status: She is alert and oriented to person, place, and time.   Psychiatric:         Mood and Affect: Mood normal.         Behavior: Behavior normal.         Thought Content: Thought content normal.         Lab Results   Component Value Date    CHOL 140 08/02/2023    CHOL 173 07/25/2023     Lab Results   Component Value Date    HDL 34 (L) 08/02/2023    HDL 29 (L) 07/25/2023     Lab Results   Component Value Date    LDLCALC 91.2 08/02/2023    LDLCALC 102.0 07/25/2023     Lab Results   Component Value Date    TRIG 74 08/02/2023    TRIG 210 (H) 07/25/2023    TRIG 105 06/15/2021     Lab Results   Component Value Date    CHOLHDL 24.3 08/02/2023    CHOLHDL 16.8 (L) 07/25/2023       Chemistry        Component Value Date/Time     08/02/2023 1001    K 3.8 08/02/2023 1001     08/02/2023 1001    CO2 23 08/02/2023 1001    BUN 10 08/02/2023 1001    CREATININE 0.8 08/02/2023 1001    GLU 79 08/02/2023 1001        Component Value Date/Time    CALCIUM 9.7 08/02/2023 1001    ALKPHOS 55 08/02/2023 1001    AST 15 08/02/2023 1001    ALT 13 08/02/2023 1001    BILITOT 0.3 08/02/2023 1001    ESTGFRAFRICA >60  "06/14/2022 1025    EGFRNONAA >60 06/14/2022 1025          Lab Results   Component Value Date    TSH 0.742 08/02/2023    TSH 0.742 08/02/2023     No results found for: "INR", "PROTIME"  @RESUFAST(WBC,HGB,HCT,MCV,PLT)  @LABRCNTIP(BNP,BNPTRIAGEBLO)@  CrCl cannot be calculated (Patient's most recent lab result is older than the maximum 7 days allowed.).     Results for orders placed during the hospital encounter of 07/24/23    Echo    Interpretation Summary  · The left ventricle is normal in size with concentric hypertrophy and low normal systolic function.  · Mild left atrial enlargement.  · The estimated ejection fraction is 50%.  · Normal left ventricular diastolic function.  · Normal right ventricular size with normal right ventricular systolic function.  · Mild mitral regurgitation.  · Mild tricuspid regurgitation.  · Intermediate central venous pressure (8 mmHg).  · The estimated PA systolic pressure is 31 mmHg.     Results for orders placed during the hospital encounter of 07/24/23    Nuclear Stress - Cardiology Interpreted    Interpretation Summary    Normal myocardial perfusion scan. There is no evidence of myocardial ischemia or infarction.    There is a mild to moderate intensity perfusion abnormality in the anterior and anteroseptal wall of the left ventricle, secondary to breast attenuation.    There is a mild intensity fixed perfusion abnormality in the  wall of the left ventricle, secondary to soft tissue attenuation.    The gated perfusion images showed an ejection fraction of 62% at rest. The gated perfusion images showed an ejection fraction of 64% post stress. Normal ejection fraction is greater than 59%.    There is normal wall motion at rest and post stress.    The ECG portion of the study is negative for ischemia.     Assessment:      1. Primary hypertension    2. Claudication    3. Dyspnea on exertion        Plan:   Primary hypertension    Claudication    Dyspnea on exertion      Cont amlodipine, " metoprolol-HTN  Statin-HLD  Neg nuc, sed, CRP and D dimer will refer to GI to rule out any GI causes  Smoking cessation  RF modifications, low Na low fat diet  Daily exercise as tolerated    RTC 6 mos or sooner if needed    Courtney Guillot, FNP-C [facility], Cardiology

## 2023-11-07 NOTE — TELEPHONE ENCOUNTER
Received Refill PA request via [facility]  for Levetiracetam (Keppra) 750 MG Tabs. (Quantity:360, Day Supply:90) - Copay $0.00 - PA previously approved end date 09/28/2024     Insurance: [place] Medicaid [employer]  Member ID:  [id number]  BIN: [id number]  PCN: [id number]  Group: [id number]     Ran Test claim via [place] & medication Pays for a $0.00 copay. Will outreach to patient to offer specialty pharmacy services and or release to preferred pharmacy

## 2023-11-07 NOTE — PATIENT INSTRUCTIONS
-Ambulatory (take home) EEG, 24 hours. I have requested first available.   -Take 1 tablet of Valium 5 mg at the time of the MRI (note for drug testing provided)  -MRI of the brain for the seizures and MR venogram for the headaches  -Keppra lab to check your blood level  -Take Keppra 1500 mg in the morning, 750 mg at noon, and 1500 mg nightly (2 tabs, 1 tab, 2 tabs)    For headaches, start  -Magnesium oxide 400 mg daily with food (can cause loose stools)  -Riboflavin (vitamin B2) 400 mg daily

## 2023-11-07 NOTE — PROGRESS NOTES
"[facility]  Follow up    Patient name: Zeina Davis  YOB: 1995  MRN: [id number]  Date of visit: 9/27/2023       Background:    Zeina Davis is a 28 y.o. 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 Previously seeing Dr. Casillas. Established care here in 2021. She had previously been lost to follow up and has not undergone MRI brain epilepsy protocol or 48-72 hour ambulatory EEG.     Per initial note 4/8/21 by Dr. Casillas:  \"Seizures: weird tase, clammy, hands and feet become clonic, lose awareness, has GTCs. As been hospitalized. Post-ictal confusion incoordination and weakness, 15-30 minutes post ictal. Random times per day. Triggers: not eating, occurs in high anxiety situations, poor sleep. Was occurring once per month up untl she got pregnant 10 months ago. Hasn't had anything in 10 month     Boyfriend thinks he may be \"having absence\" seizure but she thinks she just spaces out.     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      Started when 16. Occurring once per month.\"    Details from last visit:  She is currently 23 weeks pregnant.  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  She has a 2-1/2-year-old son.      She was admitted for breakthrough seizures in the setting of pregnancy 9/12-9/13.  High risk OB had recommended increasing her Keppra to 2000 mg twice daily while she was awaiting to see neurology.  A Keppra level was ordered at the last visit, however it seems it was not completed.    We briefly reviewed her typical seizure semiology.  As described in previous notes, \"weird tase, clammy, hands and feet become clonic, lose awareness, has GTCs. As been hospitalized. Post-ictal confusion incoordination and weakness, 15-30 " "minutes post ictal.\" Random times per day. Jaw locked. 7/10 times she is aware they are occurring.      She states she is trying to get her medical marijuana card.  She is out of [facility] on bond and needs to have a  approve a medical recommendation for it.  She states that Dr. Casillas had been in support of this, though I do not see this mentioned in the chart.  She states that when she is using medical marijuana, she is seizure-free.    She reports that she is currently having seizure-like episodes 5-10 times per day.  She has not undergone the previously recommended ambulatory EEG or MRI.    Medications were reviewed and updated.  She is no longer taking Wellbutrin, naltrexone, Abilify, Lexapro.  She had discussed stopping these with her psychiatrist, Dr. Fagan at [facility].  She had been taking Wellbutrin for years    She also reports having a daily headache that is holocranial, which is different from her usual migraines which are only 1 spot.  She used to take Maxalt when she was younger.  Her last migraine was at age 21.           Interval history:   The patient is accompanied to clinic by her son and grandmother.     She reports continuing to have 3-5 seizure-like episodes per day. They do not have videos. She is able to hear everything throughout ~40% of her seizures but cannot respond. She tells her kids to talk to her because she can hear everything. Per grandmother she seems confused after some of the seizures. Not able to quantify how often. She seems like she is \"in a trance\".     She was unable to undergo MRI of the brain and MR venogram. She tried twice but was too claustrophobic and anxious to stay in the machine.     She did not have the EEG. Ambulatory EEGs and MRIs had been ordered in the past in 2021, 2022 but she has not had any of these studies performed. I placed another order today for first available.     Keppra dose was increased since last visit. She is taking them three times daily now, " taking 2 in the morning, 1 tab in the middle of the day, and 2 at night. Refills not needed.     She did not get Keppra level, has a difficult time giving blood for lab draws.     History of migraines in younger years. Stopped after age 16.    Past Medical History:   Past Medical History:   Diagnosis Date    Anxiety     Bacterial vaginosis     Depression     Epilepsy (HCC)     Fx clavicle right    Hepatitis C     Migraine     PID (pelvic inflammatory disease)     Psychiatric disorder     bipoal - adhd    Substance abuse (HCC)        Past Surgical History:   Past Surgical History:   Procedure Laterality Date    GYN SURGERY      ovarian cysts    OTHER      toncils, adenoids, appendix    OTHER ABDOMINAL SURGERY      telescoping intestines    TONSILLECTOMY         Social History:   Social History     Socioeconomic History    Marital status: Single     Spouse name: Not on file    Number of children: Not on file    Years of education: Not on file    Highest education level: Not on file   Occupational History    Not on file   Tobacco Use    Smoking status: Every Day     Current packs/day: 0.50     Average packs/day: 0.5 packs/day for 8.0 years (4.0 ttl pk-yrs)     Types: Cigarettes    Smokeless tobacco: Never   Vaping Use    Vaping Use: Every day    Substances: Nicotine, Flavoring   Substance and Sexual Activity    Alcohol use: Not Currently     Comment: occasional    Drug use: Not Currently     Types: Inhaled     Comment: marijuana    Sexual activity: Not on file   Other Topics Concern    Not on file   Social History Narrative    Not on file     Social Determinants of Health     Financial Resource Strain: Not on file   Food Insecurity: Not on file   Transportation Needs: Not on file   Physical Activity: Not on file   Stress: Not on file   Social Connections: Not on file   Intimate Partner Violence: Not on file   Housing Stability: Not on file       Family Hx:   Family History   Problem Relation Age of Onset    Bipolar  disorder Mother     Depression Mother     Genitourinary () Problems Mother     Heart Attack Mother     Recurrent UTI's Mother     Sexual abuse Mother     Stroke Mother     Alcohol/Drug Father     Anxiety disorder Father     Bipolar disorder Father     Depression Father     Paranoid behavior Father     Psychiatric Illness Father        Current Medications:   Current Outpatient Medications:     diazePAM (VALIUM) 5 MG Tab, Take 1 Tablet by mouth one time as needed (one prn prior to MRI, up to 2 doses) for up to 2 doses. Indications: Feeling Anxious, Disp: 2 Tablet, Rfl: 0    levetiracetam (KEPPRA) 750 MG tablet, Take 2 Tablets by mouth 2 times a day for 720 doses. Extra tablet at noon daily, Disp: 360 Tablet, Rfl: 3    magnesium oxide 400 (240 Mg) MG Tab, Take 1 Tablet by mouth every day for 360 days. Take with food, Disp: 90 Tablet, Rfl: 3    Riboflavin 400 MG Cap, Take 1 Capsule by mouth every day for 360 days., Disp: 90 Capsule, Rfl: 3    ondansetron (ZOFRAN ODT) 4 MG TABLET DISPERSIBLE, Take 1 Tablet by mouth every 6 hours as needed for Nausea/Vomiting., Disp: 10 Tablet, Rfl: 0    metoclopramide (REGLAN) 10 MG Tab, Take 1 Tablet by mouth 4 times a day., Disp: 120 Tablet, Rfl: 1    Prenatal Vit-Fe Fumarate-FA (MULTI PRENATAL) 27-0.8 MG Tab, Take 1 Tablet by mouth every day., Disp: , Rfl:     Buprenorphine HCl-Naloxone HCl 8-2 MG FILM, DISSOLVE 1 FILM UNDER THE TONGUE ONCE A DAY 14 DAYS, Disp: , Rfl:     QUEtiapine (SEROQUEL) 100 MG Tab, Take 200 mg by mouth at bedtime. 200 mg = 2 tablets, Disp: , Rfl:     Allergies:   Allergies   Allergen Reactions    Other Drug      PT STATES SHE DOESN'T TOLERATE THE SEIZURE MEDS AND HAS TOO MANY SIDE EFFECTS    Morphine Unspecified     Pt reports cardiac arrest         Physical Exam:   Ambulatory Vitals  Vitals:    11/07/23 0705   BP: 116/68   Pulse: 70   Temp: (!) 35.4 °C (95.7 °F)   SpO2: 96%     Constitutional: Well-developed, well-nourished, good hygiene. Appears stated  age.  Respiratory: normal respiratory effort  Skin: Warm, dry, intact. No rashes observed.  Neurologic:   Mental Status: Awake, alert, oriented x 4.   Speech: Fluent with normal prosody.   Memory: Able to recall recent and remote events accurately.    Concentration: Attentive. Able to focus on history and follow multi-step commands.   Fund of Knowledge: Appropriate.   Cranial Nerves:    CN II: PERRL, visual fields full    CN III, IV, VI: EOMI without nystagmus    CN V: Facial sensation intact and symmetric in all 3 trigeminal distributions    CN VII: No facial asymmetry    CN VIII: Hearing intact to voice    CN IX and X: Palate elevates symmetrically, gag reflex not tested    CN XI: Symmetric shoulder shrug     CN XII: Tongue midline   Motor: 5/5 in upper and lower extremities bilaterally   Sensory: Intact and equal to light touch diffusely    Gait: ambulates steadily without assistive device   Movements: No resting tremors or abnormal movements observed.     Studies:      Labs reviewed      Imaging: none    EEG Results: none      Assessment/Plan:   Zeina Davis is a 28 y.o. woman with a history of epilepsy versus psychogenic nonepileptic seizures. She is a patient of Dr. Casillas, being seen in interval follow up after having an urgent follow up last month for ongoing/frequent seizure-like episodes.     She continues to have 3-5 episodes per day. At last visit she reported having 7-10 episodes for day suggesting an overall improvement. She has had multiple orders in the past for ambulatory EEGs but has not had them done, including most recent ambulatory that I ordered at the last visit. Based on the description, the events are most likely psychogenic seizures as she has retained awareness with some of the typical events. I advised her to have an episode recorded on video for review at the next visit. The need for EEG to capture and characterize events was discussed at length at each previous visit with  myself and Dr. Casillas.     She has increased medication doses to 2 tabs Keppra 750 mg in the morning, 1 tab in afternoon, and 2 tabs nightly. This was done independently without recommendation. Per last note she was reportedly taking 2g BID. I provided a refill with medication adjustments to reflect how she has been taking the medication. It is reasonable to continue taking the medication as she has been to prevent seizures, though as above I suspect that the seizures are psychogenic/non-epileptic, cannot rule out concomitant epilepsy without EEG to provide greater diagnostic clarity. I strongly urged her to have a Keppra level drawn to ensure that it is therapeutic.     Side effects of treatments were discussed.     In order to undergo the MRI the patient requires medication for sedation. We discussed options at length. Valium is likely the safest option, 2 tabs of 5 mg were prescribed. I provided a note for drug testing that she will be expected to turn out positive for benzodiazepines after the MRI/valium dose(s) are taken.     I also showed the patient how to use [employer] to communicate any concerns or new issues that arise.     She also reports ongoing headache.  Given her history of multiple miscarriages, current state of pregnancy, and headache character, we need to rule out cerebral venous sinus thrombosis.  She is open to having an MRI of the brain and an MR venogram to rule out this possibility, and I have ordered these tests.    She should have more frequent follow-up with neurology, plan for follow-up in 4 weeks, video visit.     Alice Hickey M.D.   Diplomate, Neurology with Special Qualification in Epilepsy, American Board of Psychiatry and Neurology   of Clinical Neurology, [facility]  Level III Epilepsy Center, Department of Neurology at [facility]   9/27/2023      During today's encounter we discussed available treatment  options and their individual side effect profiles. Total encounter time caring for patient today 50 minutes.

## 2023-11-07 NOTE — LETTER
To whom it may concern,     Zeina Davis is undergoing two MRIs to evaluate for an underlying cause for symptoms that are new and severe. She attempted to undergo these MRIs twice but was unable to do so due to severe claustrophobia. These tests are medically necessary. To successfully undergo these tests I have prescribed Valium 5 mg once as needed prior to MRIs. Please allow this note to serve as confirmation that she will be taking Valium at the time of her MRIs and that her urine drug screen will be positive for benzodiazepines.       Alice Melvin M.D.

## 2023-12-06 ENCOUNTER — HOSPITAL ENCOUNTER (EMERGENCY)
Facility: MEDICAL CENTER | Age: 28
End: 2023-12-07
Attending: OBSTETRICS & GYNECOLOGY | Admitting: OBSTETRICS & GYNECOLOGY
Payer: MEDICAID

## 2023-12-06 VITALS
SYSTOLIC BLOOD PRESSURE: 109 MMHG | HEIGHT: 66 IN | DIASTOLIC BLOOD PRESSURE: 61 MMHG | BODY MASS INDEX: 28.93 KG/M2 | HEART RATE: 64 BPM | WEIGHT: 180 LBS | RESPIRATION RATE: 16 BRPM | TEMPERATURE: 97.4 F

## 2023-12-06 LAB
A1 MICROGLOB PLACENTAL VAG QL: NEGATIVE
AMPHET UR QL SCN: NEGATIVE
APPEARANCE UR: ABNORMAL
BARBITURATES UR QL SCN: NEGATIVE
BASOPHILS # BLD AUTO: 0.2 % (ref 0–1.8)
BASOPHILS # BLD: 0.02 K/UL (ref 0–0.12)
BENZODIAZ UR QL SCN: NEGATIVE
BZE UR QL SCN: NEGATIVE
CANNABINOIDS UR QL SCN: NEGATIVE
COLOR UR AUTO: ABNORMAL
CRYSTALS AMN MICRO: NORMAL
EOSINOPHIL # BLD AUTO: 0.08 K/UL (ref 0–0.51)
EOSINOPHIL NFR BLD: 0.6 % (ref 0–6.9)
ERYTHROCYTE [DISTWIDTH] IN BLOOD BY AUTOMATED COUNT: 40.6 FL (ref 35.9–50)
FENTANYL UR QL: NEGATIVE
GLUCOSE UR QL STRIP.AUTO: NEGATIVE MG/DL
HBV SURFACE AG SER QL: ABNORMAL
HCT VFR BLD AUTO: 39 % (ref 37–47)
HCV AB SER QL: REACTIVE
HGB BLD-MCNC: 13.3 G/DL (ref 12–16)
HIV 1+2 AB+HIV1 P24 AG SERPL QL IA: NORMAL
IMM GRANULOCYTES # BLD AUTO: 0.1 K/UL (ref 0–0.11)
IMM GRANULOCYTES NFR BLD AUTO: 0.8 % (ref 0–0.9)
KETONES UR QL STRIP.AUTO: 40 MG/DL
LEUKOCYTE ESTERASE UR QL STRIP.AUTO: NEGATIVE
LYMPHOCYTES # BLD AUTO: 3.23 K/UL (ref 1–4.8)
LYMPHOCYTES NFR BLD: 25.1 % (ref 22–41)
MCH RBC QN AUTO: 30.4 PG (ref 27–33)
MCHC RBC AUTO-ENTMCNC: 34.1 G/DL (ref 32.2–35.5)
MCV RBC AUTO: 89 FL (ref 81.4–97.8)
METHADONE UR QL SCN: NEGATIVE
MONOCYTES # BLD AUTO: 0.62 K/UL (ref 0–0.85)
MONOCYTES NFR BLD AUTO: 4.8 % (ref 0–13.4)
NEUTROPHILS # BLD AUTO: 8.8 K/UL (ref 1.82–7.42)
NEUTROPHILS NFR BLD: 68.5 % (ref 44–72)
NITRITE UR QL STRIP.AUTO: NEGATIVE
NRBC # BLD AUTO: 0 K/UL
NRBC BLD-RTO: 0 /100 WBC (ref 0–0.2)
OPIATES UR QL SCN: NEGATIVE
OXYCODONE UR QL SCN: NEGATIVE
PCP UR QL SCN: NEGATIVE
PH UR STRIP.AUTO: 6 [PH] (ref 5–8)
PLATELET # BLD AUTO: 253 K/UL (ref 164–446)
PMV BLD AUTO: 11.4 FL (ref 9–12.9)
PROPOXYPH UR QL SCN: NEGATIVE
PROT UR QL STRIP: 30 MG/DL
RBC # BLD AUTO: 4.38 M/UL (ref 4.2–5.4)
RBC UR QL AUTO: ABNORMAL
RUBV AB SER QL: >500 IU/ML
SP GR UR STRIP.AUTO: >=1.03 (ref 1–1.03)
T PALLIDUM AB SER QL IA: ABNORMAL
WBC # BLD AUTO: 12.9 K/UL (ref 4.8–10.8)

## 2023-12-06 PROCEDURE — 89060 EXAM SYNOVIAL FLUID CRYSTALS: CPT

## 2023-12-06 PROCEDURE — 86592 SYPHILIS TEST NON-TREP QUAL: CPT

## 2023-12-06 PROCEDURE — A9270 NON-COVERED ITEM OR SERVICE: HCPCS | Mod: UD | Performed by: OBSTETRICS & GYNECOLOGY

## 2023-12-06 PROCEDURE — 86780 TREPONEMA PALLIDUM: CPT

## 2023-12-06 PROCEDURE — 86762 RUBELLA ANTIBODY: CPT

## 2023-12-06 PROCEDURE — 87522 HEPATITIS C REVRS TRNSCRPJ: CPT

## 2023-12-06 PROCEDURE — 87340 HEPATITIS B SURFACE AG IA: CPT

## 2023-12-06 PROCEDURE — 700102 HCHG RX REV CODE 250 W/ 637 OVERRIDE(OP): Mod: UD | Performed by: OBSTETRICS & GYNECOLOGY

## 2023-12-06 PROCEDURE — 36415 COLL VENOUS BLD VENIPUNCTURE: CPT

## 2023-12-06 PROCEDURE — 84112 EVAL AMNIOTIC FLUID PROTEIN: CPT

## 2023-12-06 PROCEDURE — 86803 HEPATITIS C AB TEST: CPT

## 2023-12-06 PROCEDURE — 87389 HIV-1 AG W/HIV-1&-2 AB AG IA: CPT

## 2023-12-06 PROCEDURE — 81002 URINALYSIS NONAUTO W/O SCOPE: CPT | Mod: XU

## 2023-12-06 PROCEDURE — 80307 DRUG TEST PRSMV CHEM ANLYZR: CPT

## 2023-12-06 PROCEDURE — 85025 COMPLETE CBC W/AUTO DIFF WBC: CPT

## 2023-12-06 PROCEDURE — 302449 STATCHG TRIAGE ONLY (STATISTIC)

## 2023-12-06 RX ORDER — NIFEDIPINE 10 MG/1
20 CAPSULE ORAL ONCE
Status: COMPLETED | OUTPATIENT
Start: 2023-12-06 | End: 2023-12-06

## 2023-12-06 RX ORDER — SODIUM CHLORIDE, SODIUM LACTATE, POTASSIUM CHLORIDE, AND CALCIUM CHLORIDE .6; .31; .03; .02 G/100ML; G/100ML; G/100ML; G/100ML
2000 INJECTION, SOLUTION INTRAVENOUS ONCE
Status: DISCONTINUED | OUTPATIENT
Start: 2023-12-06 | End: 2023-12-07 | Stop reason: HOSPADM

## 2023-12-06 RX ORDER — NIFEDIPINE 10 MG/1
10 CAPSULE ORAL ONCE
Status: COMPLETED | OUTPATIENT
Start: 2023-12-06 | End: 2023-12-06

## 2023-12-06 RX ADMIN — NIFEDIPINE 20 MG: 10 CAPSULE ORAL at 23:00

## 2023-12-06 RX ADMIN — NIFEDIPINE 10 MG: 10 CAPSULE ORAL at 20:56

## 2023-12-06 ASSESSMENT — FIBROSIS 4 INDEX: FIB4 SCORE: .4827586206896551724

## 2023-12-07 PROCEDURE — 59025 FETAL NON-STRESS TEST: CPT

## 2023-12-07 NOTE — PROGRESS NOTES
1900 - Report received from Silvia HORN. POC discussed, assumed care of patient at this time.     Patient reports vaginal/rectal nerve tingling that is very painful.    Late entry due to patient care     1950 -  updated of pt arrival, complaint, VS, FHT. Orders received. See manage orders/order hx.     Pt and RN note visible and audible fetal movement. Difficulty monitoring pt due to maternal discomfort and audible fetal movement.     PO hydration given to patient.     Patient reports continued issues w lab draws due to hx of drug use. Patient was poked 5 times today.     Anesthesia requested for US guided IV. Unable to place IV. Some labs drawn. Unable to draw pink top due to IV access being lost. No IV fluids able to be administered.    PO hydration encouraged.     2248 -  notified of pt status. Orders received. See manage orders/order hx. Pt reports not feeling CTX/Cramping.     2346 - Dr. Li updated of patient report of nothing changing, not feeling contractions and desire to go home. MD to to come to bedside.     2350 -  to bedside. Provider updated of tracing, massive fetal movements and continued contractions. MD aware of tracing. POC discussed, pt and FOB agreeable. Discharge order received.     0000 - This RN to bedside. Discharge instructions, PTL precautions, FKC, follow up and when to return reviewed with pt and FOB. All questions answered, no further needs stated at this time.

## 2023-12-10 NOTE — PROGRESS NOTES
"[facility]psy [facility]  Follow up    Patient name: Zeina Davis  YOB: 1995  MRN: [id number]  Date of visit: 12/11/2023        Background:  Zeina Davis is a 28 y.o. 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 Previously seeing Dr. Casillas. Established care here in 2021. She had previously been lost to follow up and has not undergone MRI brain epilepsy protocol or 48-72 hour ambulatory EEG.     Per initial note 4/8/21 by Dr. Casillas:  \"Seizures: weird tase, clammy, hands and feet become clonic, lose awareness, has GTCs. As been hospitalized. Post-ictal confusion incoordination and weakness, 15-30 minutes post ictal. Random times per day. Triggers: not eating, occurs in high anxiety situations, poor sleep. Was occurring once per month up untl she got pregnant 10 months ago. Hasn't had anything in 10 month     Boyfriend thinks he may be \"having absence\" seizure but she thinks she just spaces out.     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      Started when 16. Occurring once per month.\"    Details from previous visits:  She is pregnant.  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  She has a 2-1/2-year-old son.      She was admitted for breakthrough seizures in the setting of pregnancy 9/12-9/13/2023.  High risk OB had recommended increasing her Keppra to 2000 mg twice daily while she was awaiting to see neurology.  A Keppra level was ordered at the last visit, however it seems it was not completed.    We briefly reviewed her typical seizure semiology.  As described in previous notes, \"weird tase, clammy, hands and feet become clonic, lose awareness, has GTCs. Has been hospitalized. Post-ictal confusion incoordination and weakness, 15-30 minutes " "post ictal.\" Random times per day. Jaw locked. 7/10 times she is aware they are occurring.      She states she is trying to get her medical marijuana card.  She is out of residential on bond and needs to have a  approve a medical recommendation for it.  She states that Dr. Casillas had been in support of this, though I do not see this mentioned in the chart.  She states that when she is using medical marijuana, she is seizure-free.    She reports that she is currently having seizure-like episodes 5-10 times per day.  She has not undergone the previously recommended ambulatory EEG or MRI.    Medications were reviewed and updated.  She is no longer taking Wellbutrin, naltrexone, Abilify, Lexapro.  She had discussed stopping these with her psychiatrist, Dr. Fagan at [facility].  She had been taking Wellbutrin for years    She also reports having a daily headache that is holocranial, which is different from her usual migraines which are only 1 spot.  She used to take Maxalt when she was younger.  Her last migraine was at age 21.         Interval history:   Last seizure-like event was 5 days. Current frequency is once every 2 weeks compared to 3-5 per day. She attributes the improvement possibly to an improvement in nausea/vomiting. She was taking 16 mg of suboxone previously and is now taking on 4 mg. No cravings, maintaining sobriety.     January 18th baby is going to be delivered. Main issue currently is discomfort related to pregnancy.       Past Medical History:   Past Medical History:   Diagnosis Date    Anxiety     Bacterial vaginosis     Depression     Epilepsy (HCC)     Fx clavicle right    Hepatitis C     Migraine     PID (pelvic inflammatory disease)     Psychiatric disorder     bipoal - adhd    Substance abuse (HCC)        Past Surgical History:   Past Surgical History:   Procedure Laterality Date    GYN SURGERY      ovarian cysts    OTHER      toncils, adenoids, appendix    OTHER ABDOMINAL SURGERY      telescoping " intestines    TONSILLECTOMY         Social History:   Social History     Socioeconomic History    Marital status: Single     Spouse name: Not on file    Number of children: Not on file    Years of education: Not on file    Highest education level: Not on file   Occupational History    Not on file   Tobacco Use    Smoking status: Former     Current packs/day: 0.50     Average packs/day: 0.5 packs/day for 8.0 years (4.0 ttl pk-yrs)     Types: Cigarettes    Smokeless tobacco: Never   Vaping Use    Vaping Use: Every day    Substances: Flavoring   Substance and Sexual Activity    Alcohol use: Not Currently     Comment: occasional    Drug use: Not Currently     Types: Inhaled     Comment: marijuana    Sexual activity: Not on file   Other Topics Concern    Not on file   Social History Narrative    Not on file     Social Determinants of Health     Financial Resource Strain: Not on file   Food Insecurity: Not on file   Transportation Needs: Not on file   Physical Activity: Not on file   Stress: Not on file   Social Connections: Not on file   Intimate Partner Violence: Not on file   Housing Stability: Not on file       Family Hx:   Family History   Problem Relation Age of Onset    Bipolar disorder Mother     Depression Mother     Genitourinary () Problems Mother     Heart Attack Mother     Recurrent UTI's Mother     Sexual abuse Mother     Stroke Mother     Alcohol/Drug Father     Anxiety disorder Father     Bipolar disorder Father     Depression Father     Paranoid behavior Father     Psychiatric Illness Father        Current Medications:   Current Outpatient Medications:     levetiracetam (KEPPRA) 750 MG tablet, Take 2 Tablets by mouth 2 times a day for 720 doses. Extra tablet at noon daily, Disp: 360 Tablet, Rfl: 3    magnesium oxide 400 (240 Mg) MG Tab, Take 1 Tablet by mouth every day for 360 days. Take with food, Disp: 90 Tablet, Rfl: 3    Riboflavin 400 MG Cap, Take 1 Capsule by mouth every day for 360 days., Disp:  90 Capsule, Rfl: 3    ondansetron (ZOFRAN ODT) 4 MG TABLET DISPERSIBLE, Take 1 Tablet by mouth every 6 hours as needed for Nausea/Vomiting., Disp: 10 Tablet, Rfl: 0    metoclopramide (REGLAN) 10 MG Tab, Take 1 Tablet by mouth 4 times a day., Disp: 120 Tablet, Rfl: 1    Prenatal Vit-Fe Fumarate-FA (MULTI PRENATAL) 27-0.8 MG Tab, Take 1 Tablet by mouth every day., Disp: , Rfl:     Buprenorphine HCl-Naloxone HCl 8-2 MG FILM, DISSOLVE 1 FILM UNDER THE TONGUE ONCE A DAY 14 DAYS, Disp: , Rfl:     QUEtiapine (SEROQUEL) 100 MG Tab, Take 200 mg by mouth at bedtime. 200 mg = 2 tablets, Disp: , Rfl:     Allergies:   Allergies   Allergen Reactions    Other Drug      PT STATES SHE DOESN'T TOLERATE THE SEIZURE MEDS AND HAS TOO MANY SIDE EFFECTS    Morphine Unspecified     Pt reports cardiac arrest         Physical Exam:   Ambulatory Vitals  There were no vitals filed for this visit.    Constitutional: Well-developed, well-nourished, good hygiene. Appears stated age.  Respiratory: normal respiratory effort  Skin: Warm, dry, intact. No rashes observed.  Neurologic:   Mental Status: Awake, alert, oriented x 4.   Speech: Fluent with normal prosody.   Memory: Able to recall recent and remote events accurately.    Concentration: Attentive. Able to focus on history and follow multi-step commands.   Fund of Knowledge: Appropriate.      Studies:      Labs reviewed      Imaging: none    EEG Results: none      Assessment/Plan:   Zeina Davis is a 28 y.o. woman with a history of epilepsy versus psychogenic nonepileptic seizures. The recent events she has been having are more suggestive of psychogenic events as there is typically maintained consciousness throughout.     Event frequency has shown progressive decrease. Currently they are occurring once every couple of weeks, compared 3-5 episodes per day at last visit, and 7-10 prior to that. She has had multiple orders in the past for ambulatory EEGs but has not had them done,  including most recent ambulatory that I ordered at the last visit and the visit prior to that. I have re-ordered the test as she was under the impression it was already scheduled.     Based on the description, the events are most likely psychogenic seizures as she has retained awareness with some of the typical events. I advised her to have an episode recorded on video for review at the next visit. The need for EEG to capture and characterize events was discussed at length at each previous visit with myself and Dr. Casillas.     She has increased medication doses to 2 tabs Keppra 750 mg in the morning, 1 tab in afternoon, and 2 tabs nightly. This was done independently without recommendation. Per last note she was reportedly taking 2g BID.  It is reasonable to continue taking the medication as she has been to prevent seizures, though as above I suspect that the seizures are psychogenic/non-epileptic, cannot rule out concomitant epilepsy without EEG to provide greater diagnostic clarity.    MRI Brain and MR venogram for holocranial headache, history of multiple miscarriages, current state of pregnancy, and headache character, with diazepam for sedation, are pending.       In order to undergo the MRI the patient requires medication for sedation. At We discussed options at length. Valium is likely the safest option, 2 tabs of 5 mg were prescribed. I provided a note for drug testing that she will be expected to turn out positive for benzodiazepines after the MRI/valium dose(s) are taken.     I also showed the patient how to use myChart to communicate any concerns or new issues that arise.     Follow up in ~2 months.     Alice Hickey M.D.   Diplomate, Neurology with Special Qualification in Epilepsy, American Board of Psychiatry and Neurology   of Clinical Neurology, [facility] of Medicine  Level III Epilepsy Center, Department of Neurology at [facility]    9/27/2023      During today's encounter we discussed available treatment options and their individual side effect profiles. Total encounter time caring for patient today 40 minutes.

## 2023-12-11 ENCOUNTER — TELEMEDICINE (OUTPATIENT)
Dept: NEUROLOGY | Facility: MEDICAL CENTER | Age: 28
End: 2023-12-11
Attending: STUDENT IN AN ORGANIZED HEALTH CARE EDUCATION/TRAINING PROGRAM
Payer: MEDICAID

## 2023-12-11 VITALS — BODY MASS INDEX: 31.02 KG/M2 | WEIGHT: 193 LBS | HEIGHT: 66 IN

## 2023-12-11 DIAGNOSIS — G40.909 SEIZURE DISORDER (HCC): ICD-10-CM

## 2023-12-11 PROCEDURE — 99215 OFFICE O/P EST HI 40 MIN: CPT | Mod: 95 | Performed by: STUDENT IN AN ORGANIZED HEALTH CARE EDUCATION/TRAINING PROGRAM

## 2023-12-11 RX ORDER — DIAZEPAM 5 MG/1
TABLET ORAL
Status: ON HOLD | COMMUNITY
End: 2024-01-19

## 2023-12-11 RX ORDER — MIDAZOLAM 5 MG/.1ML
SPRAY NASAL
Status: ON HOLD | COMMUNITY
End: 2024-01-19

## 2023-12-11 RX ORDER — FOLIC ACID 1 MG/1
1 TABLET ORAL DAILY
Status: ON HOLD | COMMUNITY
End: 2024-01-19

## 2023-12-11 ASSESSMENT — FIBROSIS 4 INDEX: FIB4 SCORE: 0.55

## 2023-12-19 ENCOUNTER — APPOINTMENT (OUTPATIENT)
Dept: RADIOLOGY | Facility: MEDICAL CENTER | Age: 28
End: 2023-12-19
Attending: OBSTETRICS & GYNECOLOGY
Payer: MEDICAID

## 2023-12-19 ENCOUNTER — HOSPITAL ENCOUNTER (EMERGENCY)
Facility: MEDICAL CENTER | Age: 28
End: 2023-12-19
Attending: OBSTETRICS & GYNECOLOGY | Admitting: OBSTETRICS & GYNECOLOGY
Payer: MEDICAID

## 2023-12-19 VITALS
DIASTOLIC BLOOD PRESSURE: 65 MMHG | BODY MASS INDEX: 30.86 KG/M2 | HEART RATE: 93 BPM | WEIGHT: 192 LBS | HEIGHT: 66 IN | OXYGEN SATURATION: 98 % | SYSTOLIC BLOOD PRESSURE: 125 MMHG | RESPIRATION RATE: 16 BRPM | TEMPERATURE: 97.2 F

## 2023-12-19 LAB
ADULT RBCS COUNTED 8505ARB2: 4950 ADULT RBC
APPEARANCE UR: CLEAR
COLOR UR AUTO: YELLOW
FETAL RBC PERCENT 8505FRBP: 0 %
FETAL STAIN FRBC 8505FRBC: 0 FETAL RBC
GLUCOSE UR QL STRIP.AUTO: NEGATIVE MG/DL
KETONES UR QL STRIP.AUTO: NEGATIVE MG/DL
LEUKOCYTE ESTERASE UR QL STRIP.AUTO: NEGATIVE
NITRITE UR QL STRIP.AUTO: NEGATIVE
NUMBER OF RH DOSES IND 8505RD: NORMAL
NUMBER OF RH DOSES IND 8505RD: NORMAL # RHIG
PH UR STRIP.AUTO: 7.5 [PH] (ref 5–8)
PROT UR QL STRIP: NEGATIVE MG/DL
RBC UR QL AUTO: NEGATIVE
SP GR UR STRIP.AUTO: 1.01 (ref 1–1.03)
WEAK D AG RBC QL: NORMAL

## 2023-12-19 PROCEDURE — 76815 OB US LIMITED FETUS(S): CPT

## 2023-12-19 PROCEDURE — 700102 HCHG RX REV CODE 250 W/ 637 OVERRIDE(OP): Mod: UD | Performed by: OBSTETRICS & GYNECOLOGY

## 2023-12-19 PROCEDURE — 76819 FETAL BIOPHYS PROFIL W/O NST: CPT

## 2023-12-19 PROCEDURE — 99284 EMERGENCY DEPT VISIT MOD MDM: CPT

## 2023-12-19 PROCEDURE — 76820 UMBILICAL ARTERY ECHO: CPT

## 2023-12-19 PROCEDURE — A9270 NON-COVERED ITEM OR SERVICE: HCPCS | Mod: UD | Performed by: OBSTETRICS & GYNECOLOGY

## 2023-12-19 PROCEDURE — 81002 URINALYSIS NONAUTO W/O SCOPE: CPT

## 2023-12-19 PROCEDURE — 59025 FETAL NON-STRESS TEST: CPT

## 2023-12-19 PROCEDURE — 86901 BLOOD TYPING SEROLOGIC RH(D): CPT

## 2023-12-19 PROCEDURE — 85460 HEMOGLOBIN FETAL: CPT

## 2023-12-19 PROCEDURE — 36415 COLL VENOUS BLD VENIPUNCTURE: CPT

## 2023-12-19 RX ORDER — NIFEDIPINE 30 MG/1
30 TABLET, EXTENDED RELEASE ORAL
Status: DISCONTINUED | OUTPATIENT
Start: 2023-12-19 | End: 2023-12-19 | Stop reason: HOSPADM

## 2023-12-19 RX ORDER — LEVETIRACETAM 500 MG/1
1500 TABLET ORAL ONCE
Status: COMPLETED | OUTPATIENT
Start: 2023-12-19 | End: 2023-12-19

## 2023-12-19 RX ADMIN — LEVETIRACETAM 1500 MG: 500 TABLET, FILM COATED ORAL at 14:56

## 2023-12-19 ASSESSMENT — PAIN SCALES - GENERAL: PAINLEVEL: 6

## 2023-12-19 ASSESSMENT — FIBROSIS 4 INDEX: FIB4 SCORE: 0.55

## 2023-12-19 NOTE — PROGRESS NOTES
"1116: Pt presents to L&D after having a grand mal seizure at approximately 0930. Pt was home alone when seizure occurred, but woke up on the floor. Pt reporting constant, mid-abdominal pain after seizure and reports area feeling \"sore\". Pt denies LOF or VB. Reports decreased FM after seizure. Pt states she has been experiencing UCs for a few weeks now and was given nifedipine SR prescription by Dr. Li. External monitors placed.     1224: Dr. Li notified. Orders received for prolonged monitoring, KB test, ultrasound, and BPP.     1321: Pt transferred to S231.     1330: Report given to Kenia ROBERT RN.       "

## 2023-12-19 NOTE — PROGRESS NOTES
1330: Received report and care of pt. US currently at bedside with pt. Plan to apply monitors back on after US is done. Plan to pad side rails for safety.     1710: Dr. Li at bedside to assess pt, notified of US results. Orders to discharge.     1740: Discharge instructions discussed with the pt, pt understand when to come back if needed and to continue going to all future scheduled appointments. All question answered.     1749: Pt walked off the unit safely.

## 2023-12-20 ENCOUNTER — TELEPHONE (OUTPATIENT)
Dept: NEUROLOGY | Facility: MEDICAL CENTER | Age: 28
End: 2023-12-20
Payer: MEDICAID

## 2023-12-20 NOTE — TELEPHONE ENCOUNTER
12/20/23  Called patient and left a voicemail letting her know that Dr Hickey would like to schedule a virtual visit for 2 months out. I left my direct number for her to call me back. HL

## 2023-12-28 NOTE — ED PROVIDER NOTES
"L and D - ER triage note.    IUP at 34w5d.  History of Grand mal seizure disorder on antiepileptic drugs - recent unwitnessed seizure despite being on medications.  History of c section x 1 - plan RLTCS and BS.  Possible fall.    S:  Patient is doing well now.  She denies feeling post-ictal.  She states that she was home alone when the seizure occurred and does report missing her medication today.  She reports abdominal pain and cramping which has now improved.  She denies nausea/vomiting/fevers/chills/night sweats.  She has been taking the nifedipine as RX for contractions.  She denies vaginal bleeding, vaginal discharge, LOF, and regular/painful contractions.    O:  /65   Pulse 93   Temp 36.2 °C (97.2 °F) (Temporal)   Resp 16   Ht 1.676 m (5' 6\")   Wt 87.1 kg (192 lb)   SpO2 98%     A, A, and O x 3 NAD    Gravid     Latest Reference Range & Units 12/19/23 12:35 12/19/23 12:52   Rh With Weak D   POS   Fetal Stain - FRBC FETAL RBC 0    Adult RBCs Counted ADULT RBC 4950    Fetal RBC Percent % 0.00    Number Of Rh Doses Indicated # RhIg Not Indicated ZERO       12/19/2023 1:25 PM        12/19/2023 1:25 PM     HISTORY/REASON FOR EXAM:  Pain     TECHNIQUE/EXAM DESCRIPTION:  Ultrasound for biophysical profile.     COMPARISON:  None     FINDINGS:  Single intrauterine gestation is identified in vertex presentation with a heart rate of 123 bpm.     Amniotic fluid volume is 20.94 cm.     Biophysical profile score is 8  out of 8 (movement 2, tone 2, breathing 0, fluid 2).     IMPRESSION:        1. Biophysical profile score is 6/8, with 0 points awarded for consecutive breathing for more than 30 seconds. Breathing motion is noted.  2. Single live intrauterine pregnancy with fetus in vertex presentation and fetal heart rate of 123 bpm.    12/19/2023 1:25 PM     HISTORY/REASON FOR EXAM:  28-year-old female with abdominal trauma status post seizure and fall.     TECHNIQUE/EXAM DESCRIPTION: OB limited ultrasound.   "   COMPARISON:  10/9/2023     FINDINGS:  Fetal Lie:  Vertex  LMP:  2023  Clinical EYAD by LMP:  2024     Placenta (Location):  Posterior. No placental abruption.  Placenta Previa: No  Placental rdGrdrrdarddrderd:rd rd3rd Amniotic Fluid Volume:  NADER = 21.60 cm     Fetal Heart Rate:  147 bpm     Cervical Length:  The uterine cervix is obscured secondary to shadowing from the overlying ossified fetal calvarium.     No maternal adnexal mass is identified.     Umbilical Artery S/D Ratio(s):  Normal, ranging between 1.89 and 2.30.     Fetal biometrics: Not obtained.     Estimated Fetal Weight:  Not determined.     Comments:  AP diameters of the fetal kidneys measure 4 mm on the right and 2.9 mm on the left. No further imaging evaluation is warranted.     IMPRESSION:     Single intrauterine pregnancy of an estimated gestational age of 34 weeks 5 days with an estimated date of delivery of 2024.     Fetal survey within normal limits.     No placental abruption.    A/P: 29 yo -0-17-1 at 34w5d who presents to L and D from home after having an unwitnessed Grand Mal seizure at 0930 (she has a known history of seizure disorder and is on antiepileptic medications).       Follow up as scheduled with Dr. Li.  Follow up with [facility] as scheduled.   labor precautions.  Seizure and fall precautions.  Follow up with neurology to adjust her medication as needed.  Continue nifedipine.  Planned RLTCS and bilateral salpingectomy on 2024.

## 2023-12-29 ENCOUNTER — HOSPITAL ENCOUNTER (EMERGENCY)
Facility: MEDICAL CENTER | Age: 28
End: 2023-12-29
Attending: OBSTETRICS & GYNECOLOGY | Admitting: OBSTETRICS & GYNECOLOGY
Payer: MEDICAID

## 2023-12-29 VITALS
RESPIRATION RATE: 16 BRPM | DIASTOLIC BLOOD PRESSURE: 63 MMHG | BODY MASS INDEX: 31.82 KG/M2 | TEMPERATURE: 98 F | OXYGEN SATURATION: 96 % | HEART RATE: 74 BPM | WEIGHT: 198 LBS | HEIGHT: 66 IN | SYSTOLIC BLOOD PRESSURE: 110 MMHG

## 2023-12-29 LAB
APPEARANCE UR: CLEAR
COLOR UR AUTO: YELLOW
GLUCOSE UR QL STRIP.AUTO: NEGATIVE MG/DL
KETONES UR QL STRIP.AUTO: NEGATIVE MG/DL
LEUKOCYTE ESTERASE UR QL STRIP.AUTO: NEGATIVE
NITRITE UR QL STRIP.AUTO: NEGATIVE
PH UR STRIP.AUTO: 7.5 [PH] (ref 5–8)
PROT UR QL STRIP: NEGATIVE MG/DL
RBC UR QL AUTO: NEGATIVE
SP GR UR STRIP.AUTO: 1.01 (ref 1–1.03)

## 2023-12-29 PROCEDURE — 700102 HCHG RX REV CODE 250 W/ 637 OVERRIDE(OP): Mod: UD | Performed by: OBSTETRICS & GYNECOLOGY

## 2023-12-29 PROCEDURE — 81002 URINALYSIS NONAUTO W/O SCOPE: CPT

## 2023-12-29 PROCEDURE — 59025 FETAL NON-STRESS TEST: CPT

## 2023-12-29 PROCEDURE — 99284 EMERGENCY DEPT VISIT MOD MDM: CPT

## 2023-12-29 PROCEDURE — A9270 NON-COVERED ITEM OR SERVICE: HCPCS | Mod: UD | Performed by: OBSTETRICS & GYNECOLOGY

## 2023-12-29 RX ORDER — NIFEDIPINE 30 MG/1
30 TABLET, EXTENDED RELEASE ORAL DAILY
Status: ON HOLD | COMMUNITY
End: 2024-01-19

## 2023-12-29 RX ORDER — NIFEDIPINE 30 MG/1
30 TABLET, EXTENDED RELEASE ORAL ONCE
Status: COMPLETED | OUTPATIENT
Start: 2023-12-29 | End: 2023-12-29

## 2023-12-29 RX ADMIN — NIFEDIPINE 30 MG: 30 TABLET, FILM COATED, EXTENDED RELEASE ORAL at 14:19

## 2023-12-29 ASSESSMENT — FIBROSIS 4 INDEX: FIB4 SCORE: 0.55

## 2023-12-29 NOTE — PROGRESS NOTES
1200 - 28 y.o.  EDC 24 at 36.1 here to L&D with complaints of decreased fetal movement, painful contractions, and abnormally shaped abdomen. EFM & TOCO applied. Pt states no loss of fluid or bleeding from vagina. Tried to obtain OB history but pt states she doesn't know all the dates for her early losses, but knows that it is 20+. Hx of prostitution and heroin addiction, currently on Suboxone.     1330 - Dr. Moya to bedside. Per MD, order patients dose of Nifedipine for contractions, check cervix, and dip urine.     1415 - SVE closed and thick. Nifidepine given. Per MD, DC patient.     1428 - Discharge education complete, all patient questions answered at this time

## 2023-12-29 NOTE — ED PROVIDER NOTES
"L & D OB ER notes    36w1d/decreased fetal mvt      Subjective:  Pt states that she had normal fetal mvt last night. She states that she slept all night and no fetal mvt was felt like other nights. Pt without fetal mvt this am and therefore came to L & D. As soon as she arrived to L & D she started having fetal mvt. Pt with contractions every 20 min. Pt takes Nifedipine for contractions but did not take it this morning. Pt without leaking of fluid.  Uterine contractions:yes  Pain: mild    Objective:   Vitals:    23 1209   BP: 110/63   Pulse: 74   Resp: 16   Temp: 36.7 °C (98 °F)   TempSrc: Oral   SpO2: 96%   Weight: 89.8 kg (198 lb)   Height: 1.676 m (5' 6\")   Gen: NAD  FHT: 140's cat 1  [place]: irritability to few  Membranes ruptured: .no    Meds:   Pnv, Nifedipine 30 XR, qd    Labs:  Recent Results (from the past 24 hour(s))   POCT urinalysis device results    Collection Time: 23  1:20 PM   Result Value Ref Range    POC Color Yellow     POC Appearance Clear     POC Glucose Negative Negative mg/dL    POC Ketones Negative Negative mg/dL    POC Specific Gravity 1.015 1.005 - 1.030    POC Blood Negative Negative    POC Urine PH 7.5 5.0 - 8.0    POC Protein Negative Negative mg/dL    POC Nitrites Negative Negative    POC Leukocyte Esterase Negative Negative       Assessment:   36w1d/decreased fetal mvt. Pt now feeling normal fetal mvt with a reassuring fetal heart tracing. Will d/c home. Pt has appt with DANITA and Dr Li next week.   Previous  section   contractions-pt on Nifedipine. Will start po hydration and give her Nifedipine dose now. Will check cervix if contractions persist. Doubt  labor.  Recurrent SAB  H/O seizure disorder          "

## 2024-01-03 ENCOUNTER — HOSPITAL ENCOUNTER (EMERGENCY)
Facility: MEDICAL CENTER | Age: 29
End: 2024-01-03
Attending: OBSTETRICS & GYNECOLOGY | Admitting: OBSTETRICS & GYNECOLOGY
Payer: MEDICAID

## 2024-01-03 VITALS
HEIGHT: 66 IN | SYSTOLIC BLOOD PRESSURE: 112 MMHG | HEART RATE: 88 BPM | WEIGHT: 197.4 LBS | OXYGEN SATURATION: 96 % | DIASTOLIC BLOOD PRESSURE: 74 MMHG | TEMPERATURE: 97.5 F | RESPIRATION RATE: 15 BRPM | BODY MASS INDEX: 31.72 KG/M2

## 2024-01-03 LAB
APPEARANCE UR: CLEAR
COLOR UR AUTO: YELLOW
GLUCOSE UR QL STRIP.AUTO: NEGATIVE MG/DL
KETONES UR QL STRIP.AUTO: NEGATIVE MG/DL
LEUKOCYTE ESTERASE UR QL STRIP.AUTO: NEGATIVE
NITRITE UR QL STRIP.AUTO: NEGATIVE
PH UR STRIP.AUTO: 6.5 [PH] (ref 5–8)
PROT UR QL STRIP: 30 MG/DL
RBC UR QL AUTO: NEGATIVE
SP GR UR STRIP.AUTO: 1.02 (ref 1–1.03)

## 2024-01-03 PROCEDURE — 81002 URINALYSIS NONAUTO W/O SCOPE: CPT

## 2024-01-03 PROCEDURE — 59025 FETAL NON-STRESS TEST: CPT

## 2024-01-03 PROCEDURE — 302449 STATCHG TRIAGE ONLY (STATISTIC)

## 2024-01-03 ASSESSMENT — FIBROSIS 4 INDEX: FIB4 SCORE: 0.55

## 2024-01-03 ASSESSMENT — PAIN SCALES - GENERAL: PAINLEVEL: 4

## 2024-01-04 NOTE — PROGRESS NOTES
Pt presents to L&D with complaints of pelvic pain and pressure. Pt ambulated to LDA 3 for assessment.     1646 TOCO and EFM applied, VSS. Pt reports +FM, denies LOF or VB. Pt reports her pelvic pain/discomfort has gotten worse over the past couple of days and she is unsure if she is in labor. Pt hopeful today is delivery day due to the discomfort. Pt educated on delivery expectations and all questions answered. SVE ft/thick/ballotable.     1720 Pt up to the restroom to provide urine sample    1725 Pt repositioned to left side and provided water, see UA.     1735 Dr. Graves updated on FHT, overall reassuring and once reactive NST obtained okay to discharge home with precautions.     1748 Reactive NST obtained.     1755 RN at bedside, labor precautions given and all questions answered.     1758 Pt discharged home in stable condition.

## 2024-01-05 ENCOUNTER — HOSPITAL ENCOUNTER (OUTPATIENT)
Dept: RADIOLOGY | Facility: MEDICAL CENTER | Age: 29
End: 2024-01-05
Attending: STUDENT IN AN ORGANIZED HEALTH CARE EDUCATION/TRAINING PROGRAM
Payer: MEDICAID

## 2024-01-05 DIAGNOSIS — R51.9 BILATERAL HEADACHES: ICD-10-CM

## 2024-01-05 DIAGNOSIS — G40.909 SEIZURE DISORDER (HCC): ICD-10-CM

## 2024-01-05 DIAGNOSIS — F40.240 CLAUSTROPHOBIA: ICD-10-CM

## 2024-01-10 ENCOUNTER — NON-PROVIDER VISIT (OUTPATIENT)
Dept: NEUROLOGY | Facility: MEDICAL CENTER | Age: 29
End: 2024-01-10
Attending: STUDENT IN AN ORGANIZED HEALTH CARE EDUCATION/TRAINING PROGRAM
Payer: MEDICAID

## 2024-01-10 DIAGNOSIS — G40.909 SEIZURE DISORDER (HCC): ICD-10-CM

## 2024-01-10 PROCEDURE — 95719 EEG PHYS/QHP EA INCR W/O VID: CPT | Performed by: STUDENT IN AN ORGANIZED HEALTH CARE EDUCATION/TRAINING PROGRAM

## 2024-01-10 PROCEDURE — 95708 EEG WO VID EA 12-26HR UNMNTR: CPT | Performed by: STUDENT IN AN ORGANIZED HEALTH CARE EDUCATION/TRAINING PROGRAM

## 2024-01-10 PROCEDURE — 95700 EEG CONT REC W/VID EEG TECH: CPT | Performed by: STUDENT IN AN ORGANIZED HEALTH CARE EDUCATION/TRAINING PROGRAM

## 2024-01-10 NOTE — PROCEDURES
AMBULATORY ELECTROENCEPHALOGRAM REPORT      REFERRING PROVIDER:  Alice Hickey M.D.  [address] [address]  [place] [id number]  DOS: 01/10/2024   DURATION OF RECORDING: (24 hours and 38 minutes)    INDICATION:  Zeina Davis 28 y.o. female presenting with  seizure(s)    CURRENT OUTPATIENT MEDICATION LIST:   Current Outpatient Medications   Medication Instructions    Buprenorphine HCl-Naloxone HCl 8-2 MG FILM DISSOLVE 1 FILM UNDER THE TONGUE ONCE A DAY 14 DAYS    diazePAM (VALIUM) 5 MG Tab PLEASE SEE ATTACHED FOR DETAILED DIRECTIONS(With MRI procedure)    folic acid (FOLVITE) 1 MG Tab 1 Tablet, Oral, DAILY    levetiracetam (KEPPRA) 1,500 mg, Oral, 2 TIMES DAILY, Extra tablet at noon daily    magnesium oxide 400 mg, Oral, DAILY, Take with food    metoclopramide (REGLAN) 10 mg, Oral, 4 TIMES DAILY    Midazolam (NAYZILAM) 5 MG/0.1ML Solution SPRAY 5MG INTO AFFECTED NOSTRILS TWICE DAILY AS NEEDED FOR SEIZURE OF ANY KIND OVER(3 MINUTES OR OCURRING FOR BACK TO BACK) 2ND DOSE 15 MINUTES AS NEEDED UP TO 4 DOSES    NIFEdipine SR (PROCADIA-XL) 30 mg, Oral, DAILY    ondansetron (ZOFRAN ODT) 4 mg, Oral, EVERY 6 HOURS PRN    Prenatal Vit-Fe Fumarate-FA (MULTI PRENATAL) 27-0.8 MG Tab 1 Tablet, Oral, DAILY    QUEtiapine (SEROQUEL) 200 mg, Oral, EVERY BEDTIME, 200 mg = 2 tablets       TECHNIQUE:   The EEG was set up and taken down by a Neurodiagnostic technologist who performed education to patient and staff.  A minimum but not limited to 23 electrodes and 23 channel recording was setup and performed by Neurodiagnostic technologist. Impedances, electrode integrity, and technical impressions were documented a minimum of every 2-24 hour period by a Neurodiagnostic Technologist and reviewed by Interpreting Physician.    DESCRIPTION OF THE RECORD:  During maximal wakefulness, the background was continuous and showed a 9 Hz posterior dominant rhythm.  Reactivity and state changes were present.  During drowsiness,  theta/delta frequencies were seen.    Sleep was captured and was characterized by diffuse background delta/theta activity with a loss of myogenic artifact.  N2 sleep transients in the form of sleep spindles and vertex waves were seen in the leads over the central regions.     ICTAL AND INTERICTAL FINDINGS:   No focal or generalized epileptiform activity noted.     No regional slowing or persistent focal asymmetries were seen.    No seizures.    Artifacts  High amplitude polymorphic delta slowing with embedded sharp activity at 16:53 as well as overriding muscle artifact is consistent with glossokinetic artifact.     ACTIVATION PROCEDURES:   Hyperventilation was performed by the patient for a total of 3 minutes. The technician performing the test noted good effort. This technique did not elicited any abnormalities on EEG.  and Intermittent Photic stimulation was performed in a stepwise fashion from 1 to 30 Hz and elicited a normal response (photic driving), most noticeable in the posterior leads.    EKG: Sampling of the EKG recording showed sinus rhythm    EVENTS:  Push button events and/or ambulatory diary events: diary not returned.     Push button markings at 15:57:16, 17:00:19, and 03:28:36 which were possibly accidental did not exhibit abnormal epileptiform correlates before, during, or after the times noted.     INTERPRETATION:  24 normal ambulatory EEG recording in the awake and drowsy/sleep state(s):  -No regional slowing or persistent focal asymmetries were seen.  -No epileptiform discharges or other epileptiform phenomena seen.  -No seizures. Clinical correlation is recommended.  -Clinical Events: none reported        Alice Hickey M.D.   Diplomate, Neurology with Special Qualification in Epilepsy, American Board of Psychiatry and Neurology   of Clinical Neurology, [facility] of Medicine  Level III Epilepsy Center, Department of Neurology at [facility]  [facility]   1/12/2024

## 2024-01-11 ENCOUNTER — HOSPITAL ENCOUNTER (EMERGENCY)
Facility: MEDICAL CENTER | Age: 29
End: 2024-01-11
Attending: OBSTETRICS & GYNECOLOGY | Admitting: OBSTETRICS & GYNECOLOGY
Payer: MEDICAID

## 2024-01-11 VITALS
HEIGHT: 66 IN | WEIGHT: 202 LBS | DIASTOLIC BLOOD PRESSURE: 72 MMHG | BODY MASS INDEX: 32.47 KG/M2 | HEART RATE: 82 BPM | TEMPERATURE: 97 F | SYSTOLIC BLOOD PRESSURE: 110 MMHG

## 2024-01-11 LAB
ALBUMIN SERPL BCP-MCNC: 3.6 G/DL (ref 3.2–4.9)
ALBUMIN/GLOB SERPL: 1.1 G/DL
ALP SERPL-CCNC: 131 U/L (ref 30–99)
ALT SERPL-CCNC: 8 U/L (ref 2–50)
ANION GAP SERPL CALC-SCNC: 12 MMOL/L (ref 7–16)
AST SERPL-CCNC: 16 U/L (ref 12–45)
BASOPHILS # BLD AUTO: 0.2 % (ref 0–1.8)
BASOPHILS # BLD: 0.02 K/UL (ref 0–0.12)
BILIRUB SERPL-MCNC: 0.2 MG/DL (ref 0.1–1.5)
BUN SERPL-MCNC: 6 MG/DL (ref 8–22)
CALCIUM ALBUM COR SERPL-MCNC: 8.9 MG/DL (ref 8.5–10.5)
CALCIUM SERPL-MCNC: 8.6 MG/DL (ref 8.5–10.5)
CHLORIDE SERPL-SCNC: 104 MMOL/L (ref 96–112)
CO2 SERPL-SCNC: 19 MMOL/L (ref 20–33)
CREAT SERPL-MCNC: 0.4 MG/DL (ref 0.5–1.4)
CREAT UR-MCNC: 80.28 MG/DL
EOSINOPHIL # BLD AUTO: 0.08 K/UL (ref 0–0.51)
EOSINOPHIL NFR BLD: 0.9 % (ref 0–6.9)
ERYTHROCYTE [DISTWIDTH] IN BLOOD BY AUTOMATED COUNT: 40.6 FL (ref 35.9–50)
GFR SERPLBLD CREATININE-BSD FMLA CKD-EPI: 138 ML/MIN/1.73 M 2
GLOBULIN SER CALC-MCNC: 3.2 G/DL (ref 1.9–3.5)
GLUCOSE SERPL-MCNC: 75 MG/DL (ref 65–99)
HCT VFR BLD AUTO: 34.1 % (ref 37–47)
HGB BLD-MCNC: 11.6 G/DL (ref 12–16)
IMM GRANULOCYTES # BLD AUTO: 0.08 K/UL (ref 0–0.11)
IMM GRANULOCYTES NFR BLD AUTO: 0.9 % (ref 0–0.9)
LYMPHOCYTES # BLD AUTO: 1.94 K/UL (ref 1–4.8)
LYMPHOCYTES NFR BLD: 22.6 % (ref 22–41)
MCH RBC QN AUTO: 30.2 PG (ref 27–33)
MCHC RBC AUTO-ENTMCNC: 34 G/DL (ref 32.2–35.5)
MCV RBC AUTO: 88.8 FL (ref 81.4–97.8)
MONOCYTES # BLD AUTO: 0.56 K/UL (ref 0–0.85)
MONOCYTES NFR BLD AUTO: 6.5 % (ref 0–13.4)
NEUTROPHILS # BLD AUTO: 5.89 K/UL (ref 1.82–7.42)
NEUTROPHILS NFR BLD: 68.9 % (ref 44–72)
NRBC # BLD AUTO: 0 K/UL
NRBC BLD-RTO: 0 /100 WBC (ref 0–0.2)
PLATELET # BLD AUTO: 225 K/UL (ref 164–446)
PMV BLD AUTO: 10.7 FL (ref 9–12.9)
POTASSIUM SERPL-SCNC: 4.1 MMOL/L (ref 3.6–5.5)
PROT SERPL-MCNC: 6.8 G/DL (ref 6–8.2)
PROT UR-MCNC: 20 MG/DL (ref 0–15)
PROT/CREAT UR: 249 MG/G (ref 10–107)
RBC # BLD AUTO: 3.84 M/UL (ref 4.2–5.4)
SODIUM SERPL-SCNC: 135 MMOL/L (ref 135–145)
URATE SERPL-MCNC: 3.2 MG/DL (ref 1.9–8.2)
WBC # BLD AUTO: 8.6 K/UL (ref 4.8–10.8)

## 2024-01-11 PROCEDURE — 82570 ASSAY OF URINE CREATININE: CPT

## 2024-01-11 PROCEDURE — 85025 COMPLETE CBC W/AUTO DIFF WBC: CPT

## 2024-01-11 PROCEDURE — 84156 ASSAY OF PROTEIN URINE: CPT

## 2024-01-11 PROCEDURE — 302449 STATCHG TRIAGE ONLY (STATISTIC)

## 2024-01-11 PROCEDURE — 80053 COMPREHEN METABOLIC PANEL: CPT

## 2024-01-11 PROCEDURE — 36415 COLL VENOUS BLD VENIPUNCTURE: CPT

## 2024-01-11 PROCEDURE — 59025 FETAL NON-STRESS TEST: CPT

## 2024-01-11 PROCEDURE — 84550 ASSAY OF BLOOD/URIC ACID: CPT

## 2024-01-11 ASSESSMENT — FIBROSIS 4 INDEX: FIB4 SCORE: 0.55

## 2024-01-11 NOTE — PROGRESS NOTES
1450 - Pt a . Due 2024 making her 38.0 weeks. Pt has a hx of epilepsy, c/s x1, and Bipolar. Pt states last seizure was 3 days ago. Pt was at [facility] today and was told to come to labor and delivery because of elevated BP and increased NADER. Pt has c/s scheduled for Thursday.   Pt concerned for BP and for increased NADER which she was told she could possibly be from GDM.  Call placed to Dr. Li. [facility] labs ordered. Serial BPs taken.

## 2024-01-12 NOTE — PROGRESS NOTES
1630 Assumed care of pt.     1632 Dr. Li updated, orders for discharge received.     1633 RN at bedside, POC discussed. Labor precautions and all questions answered. Pt has an appointment with Dr. Li tomorrow.     1642 Pt discharged home in stable condition.

## 2024-01-15 ENCOUNTER — APPOINTMENT (OUTPATIENT)
Dept: ADMISSIONS | Facility: MEDICAL CENTER | Age: 29
End: 2024-01-15
Attending: OBSTETRICS & GYNECOLOGY
Payer: MEDICAID

## 2024-01-16 ENCOUNTER — PRE-ADMISSION TESTING (OUTPATIENT)
Dept: ADMISSIONS | Facility: MEDICAL CENTER | Age: 29
End: 2024-01-16
Attending: OBSTETRICS & GYNECOLOGY
Payer: MEDICAID

## 2024-01-16 NOTE — H&P
DATE OF PROCEDURE:  2024.     PREOPERATIVE DIAGNOSES:  1.  38 weeks 6 days.  2.  History of previous  x1.  3.  Multiparous, desires permanent sterilization.  4.  History of seizure disorder.  5.  History of bipolar disorder.  6.  History of  contractions.  7.  Polyhydramnios with an NADER of 27.7.     PLANNED PROCEDURES:  Repeat low transverse  section via Pfannenstiel   skin incision and bilateral salpingectomy with the LigaSure.     HISTORY OF PRESENT ILLNESS:  The patient is a 28-year-old  19, para   1-0-17-1 at 38+6 weeks' gestation based upon her LMP, which is consistent with   an 11+1 week ultrasound performed on 2023, who presents for scheduled   repeat  with bilateral salpingectomy for permanent sterilization.    The patient has had a complicated pregnancy and [facility].  She has a history of seizure disorder and is on Keppra.    She also has a history of bipolar disorder and is on Abilify.  The patient has   a history of narcotic abuse and is on Suboxone at a low dose.  She was sent   to labor and delivery at the end of last week for labile blood pressures, but   had normal blood pressures in triage and normal preeclampsia labs.  She has   been having uterine contractions since 35 weeks' gestation.  Risks, benefits,   and alternatives of a repeat low transverse  section via Pfannenstiel   skin incision and bilateral salpingectomy with the LigaSure were discussed   with the patient and she agrees to proceed.     Prenatal care with Dr. Odilia Li, first visit on 2023, total visits   9, total weight gain 41 pounds.  Third trimester blood pressures   114-122/70-77.     PRENATAL LABORATORIES:  GBS negative on 2023.  GC chlamydia negative,   negative.  NIPT testing negative for trisomies 21, 18, 13.  Male fetus.  The   patient had her prenatal labs performed at [facility] and they are in the   computer, I  will dictate an addendum with those results.     OBSTETRIC ULTRASOUND:    1.  On 2023 at 11+1 weeks' gestation, EYAD 2024.  2.  On 2023 at 13+4 weeks' gestation, EYAD 2024.  3.  On 2023 at 16 weeks' gestation, EYAD 2024 at [facility].  Normal fetal growth.  Posterior placenta, no previa.  4.  On 2023 at 16+0 weeks' gestation, EYAD 2024.  Estimated fetal   weight 130 grams.  Five ounces.  5.  On 2023 at 21+2 weeks' gestation, EYAD 2024.  Estimated fetal   weight 15 ounces.  26th percentile.  Vertex presentation.  Posterior grade I   placenta, no previa.  6.  On 10/16/2023 at [facility], estimated gestational age 25   weeks 1 day.  Vertex presentation.  Normal fetal growth.  Regular fetal heart   rate 150 beats per minute.  Posterior left lateral fundal placenta, no previa.    Estimated fetal weight 745 grams.  One pound 1 ounce.  15th percentile.  7.  On 2023 at [facility] 29 weeks 1 day.  EYAD   2024.  Vertex presentation.  Normal fetal growth.  Posterior placenta,   no previa.  Estimated fetal weight 1296 grams.  Two pounds 14 ounces.  15th   percentile. Male fetus.  NADER 21.4.  8.  On 2024 at 36+6 weeks' gestation, estimated fetal weight 3005 grams.    Six pounds 10 ounces.  30th percentile for growth.  Regular fetal heart rate   of 139 beats per minute.  Posterior placenta, no previa.     PAST SURGICAL HISTORY:  1.  Telescopic intestine surgery.  2.  Adenoidectomy.  3.  Tonsillectomy.  4.  Primary .     ISSUES WITH THIS PREGNANCY:  Seizure disorder with a hospitalization in   September.  Multiple triage visits due to seizures, history of narcotic   addiction and polysubstance abuse, history of alcohol, cocaine,   methamphetamine, marijuana, and opioids.  She is on a program on Suboxone.     MEDICAL HISTORY:  History of seizure disorder.  Opioid use disorder,   psychiatric disorder.      ALLERGIES:  No known drug allergies.     GYNECOLOGIC HISTORY:  She denies PID, HSV or history of abnormal Pap smears.    She does have a past history of gonorrhea.     SOCIAL HISTORY:  History of polysubstance abuse, currently clean.  She denies   alcohol, tobacco and current drugs of abuse.  She is .     FAMILY HISTORY:  Noncontributory.     REVIEW OF SYSTEMS:  Negative.     PHYSICAL EXAMINATION:    VITAL SIGNS:  Blood pressure 122/76, pulse 101.  PELVIC:  Fetal heart rate 141 beats per minute.  Vertex presentation.    Estimated fetal weight 3500 grams.  Sterile vaginal exam is deferred.  EXTREMITIES:  No clubbing, cyanosis or edema.     LABORATORY DATA:  Preoperative laboratory studies are pending.     ASSESSMENT AND PLAN:  A 28-year-old  19, para 1-0-17-1 at 38+6 weeks'   gestation based upon her LMP, which is consistent with an 11+1 week   ultrasound, who has a history of a previous  x1, who is multiparous   and desires permanent sterilization.  We will proceed to the operating room   for a repeat low transverse  section via Pfannenstiel skin incision   and bilateral salpingectomy.        ______________________________  MD WEI Hodge/GREG/LIBBY    DD:  2024 14:19  DT:  2024 14:58    Job#:  [id number]

## 2024-01-16 NOTE — OR NURSING
Used to be a heroine addict, so has major issues with veins.  PLEASE USE ULTRASOUND TO START IV!  Pt has been weaning herself off Naloxone.  Last 1/2 mg dose was 1/15. Anesthesia notified.

## 2024-01-17 ENCOUNTER — HOSPITAL ENCOUNTER (INPATIENT)
Facility: MEDICAL CENTER | Age: 29
LOS: 3 days | End: 2024-01-20
Attending: OBSTETRICS & GYNECOLOGY | Admitting: OBSTETRICS & GYNECOLOGY
Payer: MEDICAID

## 2024-01-17 ENCOUNTER — APPOINTMENT (OUTPATIENT)
Dept: RADIOLOGY | Facility: MEDICAL CENTER | Age: 29
End: 2024-01-17
Attending: OBSTETRICS & GYNECOLOGY
Payer: MEDICAID

## 2024-01-17 ENCOUNTER — ANESTHESIA (OUTPATIENT)
Dept: OBGYN | Facility: MEDICAL CENTER | Age: 29
End: 2024-01-17
Payer: MEDICAID

## 2024-01-17 ENCOUNTER — ANESTHESIA EVENT (OUTPATIENT)
Dept: OBGYN | Facility: MEDICAL CENTER | Age: 29
End: 2024-01-17
Payer: MEDICAID

## 2024-01-17 DIAGNOSIS — Z09 SURGERY FOLLOW-UP: ICD-10-CM

## 2024-01-17 LAB
BASOPHILS # BLD AUTO: 0.1 % (ref 0–1.8)
BASOPHILS # BLD: 0.01 K/UL (ref 0–0.12)
EOSINOPHIL # BLD AUTO: 0.05 K/UL (ref 0–0.51)
EOSINOPHIL NFR BLD: 0.5 % (ref 0–6.9)
ERYTHROCYTE [DISTWIDTH] IN BLOOD BY AUTOMATED COUNT: 40.4 FL (ref 35.9–50)
HCT VFR BLD AUTO: 34 % (ref 37–47)
HGB BLD-MCNC: 11.5 G/DL (ref 12–16)
HOLDING TUBE BB 8507: NORMAL
IMM GRANULOCYTES # BLD AUTO: 0.06 K/UL (ref 0–0.11)
IMM GRANULOCYTES NFR BLD AUTO: 0.6 % (ref 0–0.9)
LYMPHOCYTES # BLD AUTO: 2.21 K/UL (ref 1–4.8)
LYMPHOCYTES NFR BLD: 23.4 % (ref 22–41)
MCH RBC QN AUTO: 29.3 PG (ref 27–33)
MCHC RBC AUTO-ENTMCNC: 33.8 G/DL (ref 32.2–35.5)
MCV RBC AUTO: 86.5 FL (ref 81.4–97.8)
MONOCYTES # BLD AUTO: 0.53 K/UL (ref 0–0.85)
MONOCYTES NFR BLD AUTO: 5.6 % (ref 0–13.4)
NEUTROPHILS # BLD AUTO: 6.6 K/UL (ref 1.82–7.42)
NEUTROPHILS NFR BLD: 69.8 % (ref 44–72)
NRBC # BLD AUTO: 0 K/UL
NRBC BLD-RTO: 0 /100 WBC (ref 0–0.2)
PATHOLOGY CONSULT NOTE: NORMAL
PLATELET # BLD AUTO: 217 K/UL (ref 164–446)
PMV BLD AUTO: 10.7 FL (ref 9–12.9)
RBC # BLD AUTO: 3.93 M/UL (ref 4.2–5.4)
T PALLIDUM AB SER QL IA: NORMAL
WBC # BLD AUTO: 9.5 K/UL (ref 4.8–10.8)

## 2024-01-17 PROCEDURE — 700102 HCHG RX REV CODE 250 W/ 637 OVERRIDE(OP)

## 2024-01-17 PROCEDURE — C1755 CATHETER, INTRASPINAL: HCPCS | Performed by: OBSTETRICS & GYNECOLOGY

## 2024-01-17 PROCEDURE — 160048 HCHG OR STATISTICAL LEVEL 1-5: Performed by: OBSTETRICS & GYNECOLOGY

## 2024-01-17 PROCEDURE — 700102 HCHG RX REV CODE 250 W/ 637 OVERRIDE(OP): Performed by: OBSTETRICS & GYNECOLOGY

## 2024-01-17 PROCEDURE — 700105 HCHG RX REV CODE 258: Performed by: OBSTETRICS & GYNECOLOGY

## 2024-01-17 PROCEDURE — 88302 TISSUE EXAM BY PATHOLOGIST: CPT | Mod: 59

## 2024-01-17 PROCEDURE — 160035 HCHG PACU - 1ST 60 MINS PHASE I: Performed by: OBSTETRICS & GYNECOLOGY

## 2024-01-17 PROCEDURE — 770002 HCHG ROOM/CARE - OB PRIVATE (112)

## 2024-01-17 PROCEDURE — A9270 NON-COVERED ITEM OR SERVICE: HCPCS | Performed by: ANESTHESIOLOGY

## 2024-01-17 PROCEDURE — 700111 HCHG RX REV CODE 636 W/ 250 OVERRIDE (IP): Mod: JZ | Performed by: OBSTETRICS & GYNECOLOGY

## 2024-01-17 PROCEDURE — 700102 HCHG RX REV CODE 250 W/ 637 OVERRIDE(OP): Performed by: ANESTHESIOLOGY

## 2024-01-17 PROCEDURE — 0UB70ZZ EXCISION OF BILATERAL FALLOPIAN TUBES, OPEN APPROACH: ICD-10-PCS | Performed by: OBSTETRICS & GYNECOLOGY

## 2024-01-17 PROCEDURE — 160041 HCHG SURGERY MINUTES - EA ADDL 1 MIN LEVEL 4: Performed by: OBSTETRICS & GYNECOLOGY

## 2024-01-17 PROCEDURE — 36415 COLL VENOUS BLD VENIPUNCTURE: CPT

## 2024-01-17 PROCEDURE — A9270 NON-COVERED ITEM OR SERVICE: HCPCS

## 2024-01-17 PROCEDURE — 160029 HCHG SURGERY MINUTES - 1ST 30 MINS LEVEL 4: Performed by: OBSTETRICS & GYNECOLOGY

## 2024-01-17 PROCEDURE — 160009 HCHG ANES TIME/MIN: Performed by: OBSTETRICS & GYNECOLOGY

## 2024-01-17 PROCEDURE — 86780 TREPONEMA PALLIDUM: CPT

## 2024-01-17 PROCEDURE — A9270 NON-COVERED ITEM OR SERVICE: HCPCS | Performed by: OBSTETRICS & GYNECOLOGY

## 2024-01-17 PROCEDURE — 700111 HCHG RX REV CODE 636 W/ 250 OVERRIDE (IP): Mod: JZ | Performed by: ANESTHESIOLOGY

## 2024-01-17 PROCEDURE — 700105 HCHG RX REV CODE 258: Performed by: ANESTHESIOLOGY

## 2024-01-17 PROCEDURE — 700111 HCHG RX REV CODE 636 W/ 250 OVERRIDE (IP): Performed by: OBSTETRICS & GYNECOLOGY

## 2024-01-17 PROCEDURE — 160002 HCHG RECOVERY MINUTES (STAT): Performed by: OBSTETRICS & GYNECOLOGY

## 2024-01-17 PROCEDURE — 85025 COMPLETE CBC W/AUTO DIFF WBC: CPT

## 2024-01-17 RX ORDER — DOCUSATE SODIUM 100 MG/1
100 CAPSULE, LIQUID FILLED ORAL 2 TIMES DAILY PRN
Status: DISCONTINUED | OUTPATIENT
Start: 2024-01-17 | End: 2024-01-20 | Stop reason: HOSPADM

## 2024-01-17 RX ORDER — OXYCODONE HCL 5 MG/5 ML
10 SOLUTION, ORAL ORAL
Status: COMPLETED | OUTPATIENT
Start: 2024-01-17 | End: 2024-01-17

## 2024-01-17 RX ORDER — DIPHENHYDRAMINE HYDROCHLORIDE 50 MG/ML
25 INJECTION INTRAMUSCULAR; INTRAVENOUS EVERY 6 HOURS PRN
Status: ACTIVE | OUTPATIENT
Start: 2024-01-17 | End: 2024-01-18

## 2024-01-17 RX ORDER — SODIUM CHLORIDE, SODIUM LACTATE, POTASSIUM CHLORIDE, CALCIUM CHLORIDE 600; 310; 30; 20 MG/100ML; MG/100ML; MG/100ML; MG/100ML
INJECTION, SOLUTION INTRAVENOUS ONCE
Status: ACTIVE | OUTPATIENT
Start: 2024-01-17 | End: 2024-01-18

## 2024-01-17 RX ORDER — OXYCODONE HYDROCHLORIDE 10 MG/1
10 TABLET ORAL EVERY 4 HOURS PRN
Status: DISPENSED | OUTPATIENT
Start: 2024-01-17 | End: 2024-01-18

## 2024-01-17 RX ORDER — HYDROMORPHONE HYDROCHLORIDE 1 MG/ML
0.2 INJECTION, SOLUTION INTRAMUSCULAR; INTRAVENOUS; SUBCUTANEOUS
Status: DISCONTINUED | OUTPATIENT
Start: 2024-01-17 | End: 2024-01-17 | Stop reason: HOSPADM

## 2024-01-17 RX ORDER — SODIUM CHLORIDE, SODIUM GLUCONATE, SODIUM ACETATE, POTASSIUM CHLORIDE AND MAGNESIUM CHLORIDE 526; 502; 368; 37; 30 MG/100ML; MG/100ML; MG/100ML; MG/100ML; MG/100ML
INJECTION, SOLUTION INTRAVENOUS
Status: DISCONTINUED | OUTPATIENT
Start: 2024-01-17 | End: 2024-01-17 | Stop reason: SURG

## 2024-01-17 RX ORDER — ONDANSETRON 2 MG/ML
4 INJECTION INTRAMUSCULAR; INTRAVENOUS EVERY 6 HOURS PRN
Status: DISCONTINUED | OUTPATIENT
Start: 2024-01-18 | End: 2024-01-20 | Stop reason: HOSPADM

## 2024-01-17 RX ORDER — DIPHENHYDRAMINE HCL 25 MG
25 TABLET ORAL EVERY 6 HOURS PRN
Status: DISCONTINUED | OUTPATIENT
Start: 2024-01-18 | End: 2024-01-20 | Stop reason: HOSPADM

## 2024-01-17 RX ORDER — ACETAMINOPHEN 500 MG
1000 TABLET ORAL EVERY 6 HOURS PRN
Status: DISCONTINUED | OUTPATIENT
Start: 2024-01-21 | End: 2024-01-20 | Stop reason: HOSPADM

## 2024-01-17 RX ORDER — LEVETIRACETAM 500 MG/1
1500 TABLET ORAL 2 TIMES DAILY
Status: DISCONTINUED | OUTPATIENT
Start: 2024-01-17 | End: 2024-01-20 | Stop reason: HOSPADM

## 2024-01-17 RX ORDER — SODIUM CHLORIDE, SODIUM LACTATE, POTASSIUM CHLORIDE, CALCIUM CHLORIDE 600; 310; 30; 20 MG/100ML; MG/100ML; MG/100ML; MG/100ML
INJECTION, SOLUTION INTRAVENOUS PRN
Status: DISCONTINUED | OUTPATIENT
Start: 2024-01-17 | End: 2024-01-20 | Stop reason: HOSPADM

## 2024-01-17 RX ORDER — KETOROLAC TROMETHAMINE 30 MG/ML
INJECTION, SOLUTION INTRAMUSCULAR; INTRAVENOUS PRN
Status: DISCONTINUED | OUTPATIENT
Start: 2024-01-17 | End: 2024-01-17 | Stop reason: SURG

## 2024-01-17 RX ORDER — IBUPROFEN 800 MG/1
800 TABLET ORAL EVERY 8 HOURS
Status: DISCONTINUED | OUTPATIENT
Start: 2024-01-18 | End: 2024-01-20 | Stop reason: HOSPADM

## 2024-01-17 RX ORDER — SODIUM CHLORIDE, SODIUM GLUCONATE, SODIUM ACETATE, POTASSIUM CHLORIDE AND MAGNESIUM CHLORIDE 526; 502; 368; 37; 30 MG/100ML; MG/100ML; MG/100ML; MG/100ML; MG/100ML
1500 INJECTION, SOLUTION INTRAVENOUS ONCE
Status: COMPLETED | OUTPATIENT
Start: 2024-01-17 | End: 2024-01-17

## 2024-01-17 RX ORDER — DIPHENHYDRAMINE HYDROCHLORIDE 50 MG/ML
25 INJECTION INTRAMUSCULAR; INTRAVENOUS EVERY 6 HOURS PRN
Status: DISCONTINUED | OUTPATIENT
Start: 2024-01-18 | End: 2024-01-20 | Stop reason: HOSPADM

## 2024-01-17 RX ORDER — PHYTONADIONE 2 MG/ML
1 INJECTION, EMULSION INTRAMUSCULAR; INTRAVENOUS; SUBCUTANEOUS ONCE
Status: DISCONTINUED | OUTPATIENT
Start: 2024-01-17 | End: 2024-01-17

## 2024-01-17 RX ORDER — SODIUM CHLORIDE, SODIUM LACTATE, POTASSIUM CHLORIDE, CALCIUM CHLORIDE 600; 310; 30; 20 MG/100ML; MG/100ML; MG/100ML; MG/100ML
INJECTION, SOLUTION INTRAVENOUS CONTINUOUS
Status: DISCONTINUED | OUTPATIENT
Start: 2024-01-17 | End: 2024-01-20 | Stop reason: HOSPADM

## 2024-01-17 RX ORDER — METOCLOPRAMIDE HYDROCHLORIDE 5 MG/ML
10 INJECTION INTRAMUSCULAR; INTRAVENOUS ONCE
Status: COMPLETED | OUTPATIENT
Start: 2024-01-17 | End: 2024-01-17

## 2024-01-17 RX ORDER — QUETIAPINE FUMARATE 100 MG/1
100 TABLET, FILM COATED ORAL NIGHTLY
Status: DISCONTINUED | OUTPATIENT
Start: 2024-01-17 | End: 2024-01-19

## 2024-01-17 RX ORDER — ONDANSETRON 4 MG/1
4 TABLET, ORALLY DISINTEGRATING ORAL EVERY 6 HOURS PRN
Status: DISCONTINUED | OUTPATIENT
Start: 2024-01-18 | End: 2024-01-20 | Stop reason: HOSPADM

## 2024-01-17 RX ORDER — DIPHENHYDRAMINE HYDROCHLORIDE 50 MG/ML
12.5 INJECTION INTRAMUSCULAR; INTRAVENOUS EVERY 6 HOURS PRN
Status: ACTIVE | OUTPATIENT
Start: 2024-01-17 | End: 2024-01-18

## 2024-01-17 RX ORDER — BUPIVACAINE HYDROCHLORIDE 7.5 MG/ML
INJECTION, SOLUTION INTRASPINAL PRN
Status: DISCONTINUED | OUTPATIENT
Start: 2024-01-17 | End: 2024-01-17 | Stop reason: SURG

## 2024-01-17 RX ORDER — MORPHINE SULFATE 0.5 MG/ML
INJECTION, SOLUTION EPIDURAL; INTRATHECAL; INTRAVENOUS PRN
Status: DISCONTINUED | OUTPATIENT
Start: 2024-01-17 | End: 2024-01-17 | Stop reason: SURG

## 2024-01-17 RX ORDER — CEFAZOLIN SODIUM 1 G/3ML
2 INJECTION, POWDER, FOR SOLUTION INTRAMUSCULAR; INTRAVENOUS ONCE
Status: COMPLETED | OUTPATIENT
Start: 2024-01-17 | End: 2024-01-17

## 2024-01-17 RX ORDER — HYDROMORPHONE HYDROCHLORIDE 1 MG/ML
0.2 INJECTION, SOLUTION INTRAMUSCULAR; INTRAVENOUS; SUBCUTANEOUS
Status: DISPENSED | OUTPATIENT
Start: 2024-01-17 | End: 2024-01-18

## 2024-01-17 RX ORDER — VITAMIN A ACETATE, BETA CAROTENE, ASCORBIC ACID, CHOLECALCIFEROL, .ALPHA.-TOCOPHEROL ACETATE, DL-, THIAMINE MONONITRATE, RIBOFLAVIN, NIACINAMIDE, PYRIDOXINE HYDROCHLORIDE, FOLIC ACID, CYANOCOBALAMIN, CALCIUM CARBONATE, FERROUS FUMARATE, ZINC OXIDE, CUPRIC OXIDE 3080; 12; 120; 400; 1; 1.84; 3; 20; 22; 920; 25; 200; 27; 10; 2 [IU]/1; UG/1; MG/1; [IU]/1; MG/1; MG/1; MG/1; MG/1; MG/1; [IU]/1; MG/1; MG/1; MG/1; MG/1; MG/1
1 TABLET, FILM COATED ORAL
Status: DISCONTINUED | OUTPATIENT
Start: 2024-01-18 | End: 2024-01-20 | Stop reason: HOSPADM

## 2024-01-17 RX ORDER — CITRIC ACID/SODIUM CITRATE 334-500MG
30 SOLUTION, ORAL ORAL ONCE
Status: COMPLETED | OUTPATIENT
Start: 2024-01-17 | End: 2024-01-17

## 2024-01-17 RX ORDER — OXYCODONE HYDROCHLORIDE 5 MG/1
5 TABLET ORAL EVERY 4 HOURS PRN
Status: DISCONTINUED | OUTPATIENT
Start: 2024-01-18 | End: 2024-01-18

## 2024-01-17 RX ORDER — ERYTHROMYCIN 5 MG/G
1 OINTMENT OPHTHALMIC ONCE
Status: DISCONTINUED | OUTPATIENT
Start: 2024-01-17 | End: 2024-01-17

## 2024-01-17 RX ORDER — IBUPROFEN 800 MG/1
800 TABLET ORAL EVERY 8 HOURS PRN
Status: DISCONTINUED | OUTPATIENT
Start: 2024-01-21 | End: 2024-01-20 | Stop reason: HOSPADM

## 2024-01-17 RX ORDER — DEXAMETHASONE SODIUM PHOSPHATE 4 MG/ML
INJECTION, SOLUTION INTRA-ARTICULAR; INTRALESIONAL; INTRAMUSCULAR; INTRAVENOUS; SOFT TISSUE PRN
Status: DISCONTINUED | OUTPATIENT
Start: 2024-01-17 | End: 2024-01-17 | Stop reason: SURG

## 2024-01-17 RX ORDER — OXYCODONE HCL 5 MG/5 ML
5 SOLUTION, ORAL ORAL
Status: COMPLETED | OUTPATIENT
Start: 2024-01-17 | End: 2024-01-17

## 2024-01-17 RX ORDER — SIMETHICONE 125 MG
125 TABLET,CHEWABLE ORAL 4 TIMES DAILY PRN
Status: DISCONTINUED | OUTPATIENT
Start: 2024-01-17 | End: 2024-01-20 | Stop reason: HOSPADM

## 2024-01-17 RX ORDER — MISOPROSTOL 200 UG/1
1000 TABLET ORAL ONCE
Status: COMPLETED | OUTPATIENT
Start: 2024-01-17 | End: 2024-01-17

## 2024-01-17 RX ORDER — HYDROMORPHONE HYDROCHLORIDE 1 MG/ML
0.4 INJECTION, SOLUTION INTRAMUSCULAR; INTRAVENOUS; SUBCUTANEOUS
Status: ACTIVE | OUTPATIENT
Start: 2024-01-17 | End: 2024-01-18

## 2024-01-17 RX ORDER — KETOROLAC TROMETHAMINE 15 MG/ML
15 INJECTION, SOLUTION INTRAMUSCULAR; INTRAVENOUS EVERY 6 HOURS
Status: COMPLETED | OUTPATIENT
Start: 2024-01-17 | End: 2024-01-18

## 2024-01-17 RX ORDER — HYDROMORPHONE HYDROCHLORIDE 1 MG/ML
0.1 INJECTION, SOLUTION INTRAMUSCULAR; INTRAVENOUS; SUBCUTANEOUS
Status: DISCONTINUED | OUTPATIENT
Start: 2024-01-17 | End: 2024-01-17 | Stop reason: HOSPADM

## 2024-01-17 RX ORDER — ONDANSETRON 2 MG/ML
4 INJECTION INTRAMUSCULAR; INTRAVENOUS EVERY 6 HOURS PRN
Status: ACTIVE | OUTPATIENT
Start: 2024-01-17 | End: 2024-01-18

## 2024-01-17 RX ORDER — ACETAMINOPHEN 500 MG
1000 TABLET ORAL EVERY 6 HOURS
Status: DISCONTINUED | OUTPATIENT
Start: 2024-01-18 | End: 2024-01-20 | Stop reason: HOSPADM

## 2024-01-17 RX ORDER — MISOPROSTOL 200 UG/1
TABLET ORAL
Status: COMPLETED
Start: 2024-01-17 | End: 2024-01-17

## 2024-01-17 RX ORDER — ONDANSETRON 2 MG/ML
INJECTION INTRAMUSCULAR; INTRAVENOUS PRN
Status: DISCONTINUED | OUTPATIENT
Start: 2024-01-17 | End: 2024-01-17 | Stop reason: SURG

## 2024-01-17 RX ORDER — SCOLOPAMINE TRANSDERMAL SYSTEM 1 MG/1
PATCH, EXTENDED RELEASE TRANSDERMAL PRN
Status: DISCONTINUED | OUTPATIENT
Start: 2024-01-17 | End: 2024-01-17 | Stop reason: SURG

## 2024-01-17 RX ORDER — OXYTOCIN 10 [USP'U]/ML
10 INJECTION, SOLUTION INTRAMUSCULAR; INTRAVENOUS
Status: DISCONTINUED | OUTPATIENT
Start: 2024-01-17 | End: 2024-01-20 | Stop reason: HOSPADM

## 2024-01-17 RX ORDER — OXYCODONE HYDROCHLORIDE 10 MG/1
10 TABLET ORAL EVERY 4 HOURS PRN
Status: DISCONTINUED | OUTPATIENT
Start: 2024-01-18 | End: 2024-01-18

## 2024-01-17 RX ORDER — CALCIUM CARBONATE 500 MG/1
1000 TABLET, CHEWABLE ORAL EVERY 6 HOURS PRN
Status: DISCONTINUED | OUTPATIENT
Start: 2024-01-17 | End: 2024-01-20 | Stop reason: HOSPADM

## 2024-01-17 RX ORDER — ONDANSETRON 2 MG/ML
4 INJECTION INTRAMUSCULAR; INTRAVENOUS
Status: DISCONTINUED | OUTPATIENT
Start: 2024-01-17 | End: 2024-01-17 | Stop reason: HOSPADM

## 2024-01-17 RX ORDER — PHENYLEPHRINE HCL IN 0.9% NACL 0.5 MG/5ML
SYRINGE (ML) INTRAVENOUS PRN
Status: DISCONTINUED | OUTPATIENT
Start: 2024-01-17 | End: 2024-01-17 | Stop reason: SURG

## 2024-01-17 RX ORDER — OXYCODONE HYDROCHLORIDE 5 MG/1
5 TABLET ORAL EVERY 4 HOURS PRN
Status: ACTIVE | OUTPATIENT
Start: 2024-01-17 | End: 2024-01-18

## 2024-01-17 RX ORDER — OXYTOCIN 10 [USP'U]/ML
INJECTION, SOLUTION INTRAMUSCULAR; INTRAVENOUS PRN
Status: DISCONTINUED | OUTPATIENT
Start: 2024-01-17 | End: 2024-01-17 | Stop reason: SURG

## 2024-01-17 RX ORDER — ACETAMINOPHEN 500 MG
1000 TABLET ORAL EVERY 6 HOURS
Status: COMPLETED | OUTPATIENT
Start: 2024-01-17 | End: 2024-01-18

## 2024-01-17 RX ORDER — HYDROMORPHONE HYDROCHLORIDE 1 MG/ML
0.4 INJECTION, SOLUTION INTRAMUSCULAR; INTRAVENOUS; SUBCUTANEOUS
Status: DISCONTINUED | OUTPATIENT
Start: 2024-01-17 | End: 2024-01-17 | Stop reason: HOSPADM

## 2024-01-17 RX ADMIN — LEVETIRACETAM 1500 MG: 500 TABLET, FILM COATED ORAL at 21:45

## 2024-01-17 RX ADMIN — PHENYLEPHRINE HYDROCHLORIDE 1 MCG/KG/MIN: 10 INJECTION INTRAVENOUS at 12:24

## 2024-01-17 RX ADMIN — SCOPOLAMINE 1 PATCH: 1.5 PATCH, EXTENDED RELEASE TRANSDERMAL at 12:45

## 2024-01-17 RX ADMIN — ACETAMINOPHEN 1000 MG: 500 TABLET ORAL at 21:45

## 2024-01-17 RX ADMIN — FAMOTIDINE 20 MG: 10 INJECTION, SOLUTION INTRAVENOUS at 11:54

## 2024-01-17 RX ADMIN — KETOROLAC TROMETHAMINE 30 MG: 30 INJECTION, SOLUTION INTRAMUSCULAR; INTRAVENOUS at 12:45

## 2024-01-17 RX ADMIN — KETOROLAC TROMETHAMINE 15 MG: 15 INJECTION, SOLUTION INTRAMUSCULAR; INTRAVENOUS at 23:44

## 2024-01-17 RX ADMIN — KETOROLAC TROMETHAMINE 15 MG: 15 INJECTION, SOLUTION INTRAMUSCULAR; INTRAVENOUS at 18:50

## 2024-01-17 RX ADMIN — SODIUM CHLORIDE, SODIUM GLUCONATE, SODIUM ACETATE, POTASSIUM CHLORIDE AND MAGNESIUM CHLORIDE: 526; 502; 368; 37; 30 INJECTION, SOLUTION INTRAVENOUS at 12:13

## 2024-01-17 RX ADMIN — Medication 200 MCG: at 12:26

## 2024-01-17 RX ADMIN — ONDANSETRON 4 MG: 2 INJECTION INTRAMUSCULAR; INTRAVENOUS at 12:45

## 2024-01-17 RX ADMIN — MISOPROSTOL 1000 MCG: 200 TABLET ORAL at 13:05

## 2024-01-17 RX ADMIN — CEFAZOLIN 2 G: 1 INJECTION, POWDER, FOR SOLUTION INTRAMUSCULAR; INTRAVENOUS at 12:24

## 2024-01-17 RX ADMIN — OXYTOCIN 20 UNITS: 10 INJECTION, SOLUTION INTRAMUSCULAR; INTRAVENOUS at 12:52

## 2024-01-17 RX ADMIN — OXYTOCIN 125 ML/HR: 10 INJECTION, SOLUTION INTRAMUSCULAR; INTRAVENOUS at 14:44

## 2024-01-17 RX ADMIN — BUPIVACAINE HYDROCHLORIDE IN DEXTROSE 1.7 ML: 7.5 INJECTION, SOLUTION SUBARACHNOID at 12:23

## 2024-01-17 RX ADMIN — METOCLOPRAMIDE 10 MG: 5 INJECTION, SOLUTION INTRAMUSCULAR; INTRAVENOUS at 11:54

## 2024-01-17 RX ADMIN — SODIUM CITRATE AND CITRIC ACID MONOHYDRATE 30 ML: 334; 500 SOLUTION ORAL at 11:54

## 2024-01-17 RX ADMIN — HYDROMORPHONE HYDROCHLORIDE 0.2 MG: 1 INJECTION, SOLUTION INTRAMUSCULAR; INTRAVENOUS; SUBCUTANEOUS at 16:46

## 2024-01-17 RX ADMIN — SODIUM CHLORIDE, SODIUM GLUCONATE, SODIUM ACETATE, POTASSIUM CHLORIDE AND MAGNESIUM CHLORIDE: 526; 502; 368; 37; 30 INJECTION, SOLUTION INTRAVENOUS at 12:52

## 2024-01-17 RX ADMIN — OXYTOCIN 20 UNITS: 10 INJECTION, SOLUTION INTRAMUSCULAR; INTRAVENOUS at 12:41

## 2024-01-17 RX ADMIN — QUETIAPINE FUMARATE 100 MG: 100 TABLET ORAL at 21:45

## 2024-01-17 RX ADMIN — DEXAMETHASONE SODIUM PHOSPHATE 8 MG: 4 INJECTION INTRA-ARTICULAR; INTRALESIONAL; INTRAMUSCULAR; INTRAVENOUS; SOFT TISSUE at 12:45

## 2024-01-17 RX ADMIN — SODIUM CHLORIDE, SODIUM GLUCONATE, SODIUM ACETATE, POTASSIUM CHLORIDE AND MAGNESIUM CHLORIDE 1500 ML: 526; 502; 368; 37; 30 INJECTION, SOLUTION INTRAVENOUS at 11:35

## 2024-01-17 RX ADMIN — MORPHINE SULFATE 150 MCG: 0.5 INJECTION, SOLUTION EPIDURAL; INTRATHECAL; INTRAVENOUS at 12:23

## 2024-01-17 RX ADMIN — DOCUSATE SODIUM 100 MG: 100 CAPSULE, LIQUID FILLED ORAL at 19:24

## 2024-01-17 RX ADMIN — ACETAMINOPHEN 1000 MG: 500 TABLET ORAL at 16:04

## 2024-01-17 RX ADMIN — OXYCODONE HYDROCHLORIDE 10 MG: 5 SOLUTION ORAL at 13:56

## 2024-01-17 ASSESSMENT — FIBROSIS 4 INDEX: FIB4 SCORE: 0.7

## 2024-01-17 ASSESSMENT — PAIN DESCRIPTION - PAIN TYPE
TYPE: SURGICAL PAIN

## 2024-01-17 ASSESSMENT — PATIENT HEALTH QUESTIONNAIRE - PHQ9
SUM OF ALL RESPONSES TO PHQ9 QUESTIONS 1 AND 2: 0
2. FEELING DOWN, DEPRESSED, IRRITABLE, OR HOPELESS: NOT AT ALL
1. LITTLE INTEREST OR PLEASURE IN DOING THINGS: NOT AT ALL

## 2024-01-17 ASSESSMENT — LIFESTYLE VARIABLES
ALCOHOL_USE: NO
EVER_SMOKED: YES

## 2024-01-17 ASSESSMENT — PAIN SCALES - GENERAL
PAIN_LEVEL: 0
PAINLEVEL: 0 - NO PAIN

## 2024-01-17 NOTE — ANESTHESIA TIME REPORT
Anesthesia Start and Stop Event Times       Date Time Event    1/17/2024 1155 Ready for Procedure     1213 Anesthesia Start     1317 Anesthesia Stop          Responsible Staff  01/17/24      Name Role Begin End    Abdulaziz Najera M.D. Anesth 1213 1317          Overtime Reason:  no overtime (within assigned shift)    Comments:

## 2024-01-17 NOTE — ANESTHESIA PREPROCEDURE EVALUATION
Case: [id number] Date/Time: 24 1145    Procedure: REPEAT  SECTION WITH BILATERAL SALPINGECTOMY AND ANY OTHER MEDICALLY NECESSARY PROCEDURES    Pre-op diagnosis: PREGNANCY 3RD TRIMESTER; STERILIZATION; HX OF SEIZURE DISORDER- 38 WEEKS    Location: [facility] OR 01 / SURGERY LABOR AND DELIVERY    Surgeons: Odilia Marion M.D.            Relevant Problems   NEURO   (positive) Epilepsy (HCC)   (positive) Seizure disorder (HCC)   Hx/o heroin abuse  Bipolar    Physical Exam    Airway   Mallampati: II  TM distance: >3 FB  Neck ROM: full       Cardiovascular - normal exam  Rhythm: regular  Rate: normal  (-) murmur     Dental   Comments: No teeth         Pulmonary - normal exam  Breath sounds clear to auscultation     Abdominal    Neurological - normal exam                   Anesthesia Plan    ASA 3   ASA physical status 3 criteria: alcohol and/or substance dependence or abuse    Plan - spinal   Neuraxial block will be primary anesthetic                Postoperative Plan: Postoperative administration of opioids is intended.    Pertinent diagnostic labs and testing reviewed    Informed Consent:    Anesthetic plan and risks discussed with patient.

## 2024-01-17 NOTE — ANESTHESIA POSTPROCEDURE EVALUATION
Patient: Zeina Davis    Procedure Summary       Date: 24 Room / Location: [facility] OR 01 / SURGERY LABOR AND DELIVERY    Anesthesia Start: 1213 Anesthesia Stop:     Procedure: REPEAT  SECTION WITH BILATERAL SALPINGECTOMY AND ANY OTHER MEDICALLY NECESSARY PROCEDURES (Abdomen) Diagnosis:       Status post repeat low transverse  section      (same delivery, delivered)    Surgeons: Odilia Marion M.D. Responsible Provider: Abdulaziz Najera M.D.    Anesthesia Type: spinal ASA Status: 3            Final Anesthesia Type: spinal  Last vitals  BP   Blood Pressure: 97/52    Temp   (!) 35.7 °C (96.3 °F)    Pulse   64   Resp   16    SpO2   97 %      Anesthesia Post Evaluation    Patient location during evaluation: PACU  Patient participation: complete - patient participated  Level of consciousness: awake and alert  Pain score: 0    Airway patency: patent  Anesthetic complications: no  Cardiovascular status: hemodynamically stable  Respiratory status: acceptable  Hydration status: euvolemic    PONV: none          No notable events documented.     Nurse Pain Score: 0 (NPRS)

## 2024-01-17 NOTE — CARE PLAN
The patient is Stable - Low risk of patient condition declining or worsening    Shift Goals  Clinical Goals: pain management; anxiety support  Patient Goals: have a healthy baby  Family Goals: emotional support      Problem: Pain - Standard  Goal: Alleviation of pain or a reduction in pain to the patient’s comfort goal  Outcome: Progressing  Pain management options discussed with pt. Pt able to verbalize a pain rating on pain scale. Pt will ask for intervention as needed.     Problem: Knowledge Deficit - Standard  Goal: Patient and family/care givers will demonstrate understanding of plan of care, disease process/condition, diagnostic tests and medications  Outcome: Progressing   POC discussed with pt. Pt verbalizes understanding and asks questions as needed.    Problem: Psychosocial - L&D  Goal: Patient's level of anxiety will decrease  Outcome: Progressing    Encouraged patient participation in POC. Collaborated with interdisciplinary team and update pt in order for her to make informed decisions in her care. Allowed ample time for pt to ask questions and clarify education topics.

## 2024-01-17 NOTE — ANESTHESIA PROCEDURE NOTES
Spinal Block    Date/Time: 1/17/2024 12:18 PM    Performed by: Abdulaziz Najera M.D.  Authorized by: Abdulaziz Najera M.D.    Patient Location:  OR  Start Time:  1/17/2024 12:18 PM  End Time:  1/17/2024 12:23 PM  Reason for Block: primary anesthetic    patient identified, IV checked, site marked, risks and benefits discussed, surgical consent, monitors and equipment checked, pre-op evaluation and timeout performed    Patient Position:  Sitting  Prep: ChloraPrep, patient draped and sterile technique    Monitoring:  Blood pressure, continuous pulse oximetry and heart rate  Approach:  Midline  Location:  L2-3  Injection Technique:  Single-shot  Skin infiltration:  Lidocaine  Strength:  1%  Dose:  3ml  Needle Type:  Pencan  Needle Gauge:  25 G  CSF flowing pre/post injection:  Yes  Sensory Level:  T4

## 2024-01-17 NOTE — CARE PLAN
The patient is Stable - Low risk of patient condition declining or worsening    Shift Goals  Clinical Goals: pain management; anxiety support  Patient Goals: have a healthy baby  Family Goals: emotional support    Progress made toward(s) clinical / shift goals:  Pain well controlled     Patient is not progressing towards the following goals:

## 2024-01-17 NOTE — OR SURGEON
Immediate Post OP Note    PreOp Diagnosis:     1.  38 weeks 6 days.  2.  History of previous  x1.  3.  Multiparous, desires permanent sterilization.  4.  History of seizure disorder.  5.  History of bipolar disorder.  6.  History of  contractions.  7.  Polyhydramnios with an NADER of 27.7.      PostOp Diagnosis:      As above.  Pelvic and abdominal adhesive diseae.      Procedure(s):  REPEAT  SECTION WITH BILATERAL SALPINGECTOMY   LYSIS OF ADHESIONS.    Surgeon(s):  ZAYRA Cool M.D.    Anesthesiologist/Type of Anesthesia:  Dr. Abdulaziz Najera/spinal    Surgical Staff:  Circulator: Magalys Loco R.N.  Scrub Person: Vanda Davis  L&ERIN Baby  Nurse: Yaakov Mo R.N.    Specimens removed if any:   Portion of bilateral fallopian tubes.  2.  Cord gases.    Estimated Blood Loss: 500 CC     Findings: Viable male infant in the ZACARIAS position.  No nuchal cord.  APGARS of 8 at one minute and 9 at five minutes.  Copious clear fluid.  Weight - 3210 grams.  7 pounds 1 ounce.  Normal bilateral tubes and ovaries.  Omental adhesions to the pelvic peritoneum.  Normal uterus.    Cord gases: pending.    Complications: None apparent.        2024 12:08 PM Odilia Marion M.D.

## 2024-01-18 PROCEDURE — A9270 NON-COVERED ITEM OR SERVICE: HCPCS | Performed by: OBSTETRICS & GYNECOLOGY

## 2024-01-18 PROCEDURE — 770002 HCHG ROOM/CARE - OB PRIVATE (112)

## 2024-01-18 PROCEDURE — 302151 K-PAD 14X20: Performed by: OBSTETRICS & GYNECOLOGY

## 2024-01-18 PROCEDURE — 700102 HCHG RX REV CODE 250 W/ 637 OVERRIDE(OP): Performed by: OBSTETRICS & GYNECOLOGY

## 2024-01-18 PROCEDURE — A9270 NON-COVERED ITEM OR SERVICE: HCPCS | Performed by: ANESTHESIOLOGY

## 2024-01-18 PROCEDURE — 302131 K PAD MOTOR: Performed by: OBSTETRICS & GYNECOLOGY

## 2024-01-18 PROCEDURE — 700102 HCHG RX REV CODE 250 W/ 637 OVERRIDE(OP): Performed by: ANESTHESIOLOGY

## 2024-01-18 PROCEDURE — 700111 HCHG RX REV CODE 636 W/ 250 OVERRIDE (IP): Performed by: ANESTHESIOLOGY

## 2024-01-18 RX ORDER — HYDROCODONE BITARTRATE AND ACETAMINOPHEN 5; 325 MG/1; MG/1
1 TABLET ORAL EVERY 4 HOURS PRN
Status: DISCONTINUED | OUTPATIENT
Start: 2024-01-18 | End: 2024-01-18

## 2024-01-18 RX ORDER — HYDROCODONE BITARTRATE AND ACETAMINOPHEN 5; 325 MG/1; MG/1
1-2 TABLET ORAL EVERY 4 HOURS PRN
Status: DISCONTINUED | OUTPATIENT
Start: 2024-01-18 | End: 2024-01-20

## 2024-01-18 RX ADMIN — LEVETIRACETAM 1500 MG: 500 TABLET, FILM COATED ORAL at 17:33

## 2024-01-18 RX ADMIN — ACETAMINOPHEN 1000 MG: 500 TABLET ORAL at 17:33

## 2024-01-18 RX ADMIN — OXYCODONE HYDROCHLORIDE 10 MG: 10 TABLET ORAL at 15:17

## 2024-01-18 RX ADMIN — SIMETHICONE 125 MG: 125 TABLET, CHEWABLE ORAL at 20:49

## 2024-01-18 RX ADMIN — PRENATAL WITH FERROUS FUM AND FOLIC ACID 1 TABLET: 3080; 920; 120; 400; 22; 1.84; 3; 20; 10; 1; 12; 200; 27; 25; 2 TABLET ORAL at 08:13

## 2024-01-18 RX ADMIN — LEVETIRACETAM 1500 MG: 500 TABLET, FILM COATED ORAL at 05:39

## 2024-01-18 RX ADMIN — ACETAMINOPHEN 1000 MG: 500 TABLET ORAL at 05:39

## 2024-01-18 RX ADMIN — KETOROLAC TROMETHAMINE 15 MG: 15 INJECTION, SOLUTION INTRAMUSCULAR; INTRAVENOUS at 11:55

## 2024-01-18 RX ADMIN — OXYCODONE HYDROCHLORIDE 10 MG: 10 TABLET ORAL at 19:28

## 2024-01-18 RX ADMIN — OXYCODONE HYDROCHLORIDE 10 MG: 10 TABLET ORAL at 06:13

## 2024-01-18 RX ADMIN — OXYCODONE HYDROCHLORIDE 10 MG: 10 TABLET ORAL at 10:18

## 2024-01-18 RX ADMIN — IBUPROFEN 800 MG: 800 TABLET, FILM COATED ORAL at 17:34

## 2024-01-18 RX ADMIN — DOCUSATE SODIUM 100 MG: 100 CAPSULE, LIQUID FILLED ORAL at 20:48

## 2024-01-18 RX ADMIN — KETOROLAC TROMETHAMINE 15 MG: 15 INJECTION, SOLUTION INTRAMUSCULAR; INTRAVENOUS at 05:40

## 2024-01-18 RX ADMIN — ACETAMINOPHEN 1000 MG: 500 TABLET ORAL at 11:54

## 2024-01-18 RX ADMIN — DOCUSATE SODIUM 100 MG: 100 CAPSULE, LIQUID FILLED ORAL at 08:13

## 2024-01-18 RX ADMIN — QUETIAPINE FUMARATE 100 MG: 100 TABLET ORAL at 20:49

## 2024-01-18 RX ADMIN — SIMETHICONE 125 MG: 125 TABLET, CHEWABLE ORAL at 06:13

## 2024-01-18 ASSESSMENT — PAIN DESCRIPTION - PAIN TYPE
TYPE: SURGICAL PAIN
TYPE: ACUTE PAIN;SURGICAL PAIN
TYPE: SURGICAL PAIN

## 2024-01-18 NOTE — CARE PLAN
The patient is Stable - Low risk of patient condition declining or worsening    Shift Goals  Clinical Goals: VSS, pain WDL, lochia WDL  Patient Goals: have a healthy baby  Family Goals: emotional support    Progress made toward(s) clinical / shift goals:  VSS, pain controlled with scheduled medications, lochia WDL      Problem: Respiratory/Oxygenation Function Post-Surgical  Goal: Patient will achieve/maintain normal respiratory rate/effort  Outcome: Progressing     Problem: Early Mobilization - Post Surgery  Goal: Early mobilization post surgery  Outcome: Progressing

## 2024-01-18 NOTE — OP REPORT
DATE OF SERVICE:  2024     PREOPERATIVE DIAGNOSES:    1.  Intrauterine pregnancy at 38+6 weeks' gestation.  2.  History of previous  x1.  3.  Multiparous, desires permanent sterilization.  4.  Polyhydramnios with an NADER of 27.7 cm.  5.  History of seizure disorder, on antiepileptic drugs.  6.  History of bipolar disorder.  7.  History of  contractions.     POSTOPERATIVE DIAGNOSES:    1.  Intrauterine pregnancy at 38+6 weeks' gestation.  2.  History of previous  x1.  3.  Multiparous, desires permanent sterilization.  4.  Polyhydramnios with an NADER of 27.7 cm.  5.  History of seizure disorder, on antiepileptic drugs.  6.  History of bipolar disorder.  7.  History of  contractions.  8.  Pelvic and abdominal adhesive disease.     PROCEDURES:  Repeat low transverse  section via Pfannenstiel skin   incision, bilateral salpingectomy with the LigaSure, and lysis of adhesions.     SURGEON:  Odilia Marion MD     ASSISTANT:  Dulce Mcclendon MD     ANESTHESIOLOGIST:  Abdulaziz Najera MD     ANESTHESIA:  Spinal.     SPECIMENS:  Portion of bilateral fallopian tubes and cord gases.     ESTIMATED BLOOD LOSS:  500 mL     FINDINGS:  Viable male infant in the ZACARIAS position with Apgars of 8 at 1   minute, 9 at 5 minutes, weighing 3210 grams, 7 pounds 1 ounce.  Copious clear   fluid.  No nuchal cord.  Cord gases are pending.  Normal bilateral tubes and   ovaries.  Normal uterus.  Omental adhesions to the pelvic peritoneum.     COMPLICATIONS:  None apparent.     INDICATIONS FOR THE PROCEDURE:  The patient is a 28-year-old  19, para   1-0-17-1 at 38+6 weeks' gestation based upon her LMP, which is consistent with   an 11+1 week ultrasound performed on 2023.  The patient is followed by   maternal fetal medicine as she is high risk secondary to history of seizure   disorder, history of bipolar disorder, and history of polysubstance abuse with   narcotic abuse who is on  Suboxone at a low dose.     DETAILS OF THE PROCEDURE:  The patient was taken to the operating room where   she underwent spinal anesthesia.  She was then prepped and draped in the   dorsal supine position with leftward tilt.  A Pfannenstiel skin incision was   made with a scalpel using her previous incision.  The incision was carried to   the level of the fascia.  The fascia was incised in the midline.  The fascial   incision was extended laterally with Griffith scissors.  Areas of bleeding   cauterized with electrocautery.  The fascia was grasped with Kocher clamps,   elevated and dissected off the rectus muscles in the usual fashion.  The   rectus muscles were divided in the midline.  The peritoneum was identified,   grasped with hemostats and incised with Metzenbaum scissors.  The incision was   extended superiorly and inferiorly with good visualization of the bladder.    The peritoneum was stretched.  The Marcos O retractor was placed in the   patient's pelvis and abdomen.  The vesicouterine peritoneum was identified,   grasped with pickups and entered sharply with Metzenbaum scissors.  The   incision was extended laterally with Metzenbaum scissors and the bladder flap   was created digitally.  A low segment transverse incision was made with a   scalpel.  The incision was extended laterally with blunt dissection.  The   amniotic sac was ruptured and copious clear fluid was noted.  The vertex was   in the ZACARIAS position.  No nuchal cord noted.  The vertex was delivered without   difficulty.  Infant's mouth and nose were bulb suctioned.  The remainder of   the infant delivered without difficulty.  Delayed cord clamping for 30 seconds   was performed and then the cord was clamped and cut.     A segment of cord was clamped and cut for cord gas and these results are   pending.     The placenta delivered intact with a 3-vessel cord.  The uterus was cleared of   amniotic fluid, blood clots and membranes with moistened  laparotomy sponges.    The uterine incision was grasped with Hdz clamps and reapproximated   with 0 Vicryl in a running locked fashion.     The bilateral fallopian tubes were identified and followed out to the   fimbriated ends.  The left fallopian tube was grasped with [place] clamps,   elevated and a salpingectomy was performed from the fimbriated end of the   fallopian tube to 2 cm from the cornual region of the uterus.  The specimen   was sent to pathology.  The pedicle was examined and found to be hemostatic.    The right fallopian tube was identified and followed out to the fimbriated   end.  It was grasped with Ge clamps, elevated and a salpingectomy was   performed from the fimbriated end of the fallopian tube to 2 cm from the   cornual region of the uterus.  The specimen was sent to pathology.  The   pedicle was examined and found to be hemostatic.  The uterine incision was   reexamined and irrigated with sterile water.  This was found to be hemostatic.    The Marcos O retractor was removed from the patient's pelvis and abdomen.    The peritoneum was grasped with pickups.  There was omental adhesions from the   pelvic peritoneum to the omentum and this was incised and cauterized with   electrocautery.  The peritoneum was then more easily grasped with hemostats   and reapproximated with 2-0 Vicryl.  The rectus muscles and fascia were   irrigated with sterile water.  Areas of bleeding cauterized with   electrocautery.  The rectus muscles were reapproximated with 2-0 Vicryl in a   vertical mattress suture fashion.  The fascia was closed with 0 PDS in the   usual fashion.  The subcutaneous tissues were irrigated with sterile water.    Areas of bleeding cauterized with electrocautery.  The subcutaneous tissues   were reapproximated with 3-0 Vicryl, first in an interrupted fashion to   reapproximate Umm's fascia and then in a running fashion to bring the skin   edges into closer proximity.  The  skin was closed with 4-0 Monocryl in a   subcuticular fashion.  Bimanual uterine massage and exploration was performed   and the fundus was firm one below the umbilicus.  The patient received 1000   mcg of Cytotec per rectum x1 after the above examination.  The incision was   dressed with benzoin, Steri-Strips, and a Mepilex dressing.  The patient was   taken to the PACU in stable condition.  Sponge, lap, needle and instrument   counts were correct x2.        ______________________________  Odilia Marion MD    HTA/GREG    DD:  01/17/2024 16:58  DT:  01/17/2024 17:43    Job#:  [id number]
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
## 2024-01-18 NOTE — LACTATION NOTE
This note was copied from a baby's chart.  Initial Consult:     History:   R c/s at 38w+6d.  Maternal hx of heroin use, was on suboxone, but recently weaned off last week.  Maternal hx of Keppra with sz reported 2 weeks ago. Hx of anxiety /depression, on Seroquel.     History of BF:  first child for approx 1.5yrs     Report of Current Breastfeeding Status: MOB reported she is comfortable with positioning and latch.      Breastfeeding Assistance: MOB declined assistance with latch.      Provided breastfeeding education on: hunger cues, frequency/duration of breastfeeds, skin to skin, cluster feeding, shallow vs deep latch, and nutritive vs non-nutritive suck.    Discussed medications including Keppra and Seroquel.  Provided information from Karly Medications and Mothers Milk book, both meds considered L2 and compatible with breastfeeding.         Plan: Continue to offer infant the breast per feeding cues for a minimum of 8 or more feeds in a 24 hour period.  Frequent skin to skin as MOB awake and attentive.      Provided NN Breastfeeding Resources.

## 2024-01-18 NOTE — CARE PLAN
The patient is Stable - Low risk of patient condition declining or worsening    Shift Goals  Clinical Goals: VS WDL  Patient Goals: Breast feeding  Family Goals: emotional support    Progress made toward(s) clinical / shift goals:    Problem: Knowledge Deficit - Postpartum  Goal: Patient will verbalize and demonstrate understanding of self and infant care  Outcome: Progressing     Problem: Psychosocial - Postpartum  Goal: Patient will verbalize and demonstrate effective bonding and parenting behavior  Outcome: Progressing     Problem: Altered Physiologic Condition  Goal: Patient physiologically stable as evidenced by normal lochia, palpable uterine involution and vitals within normal limits  Outcome: Progressing     Problem: Infection - Postpartum  Goal: Postpartum patient will be free of signs and symptoms of infection  Outcome: Progressing     Problem: Respiratory/Oxygenation Function Post-Surgical  Goal: Patient will achieve/maintain normal respiratory rate/effort  Outcome: Progressing     Problem: Bowel Elimination - Post Surgical  Goal: Patient will resume regular bowel sounds and function with no discomfort or distention  Outcome: Progressing     Problem: Early Mobilization - Post Surgery  Goal: Early mobilization post surgery  Outcome: Progressing       Patient is not progressing towards the following goals:

## 2024-01-19 PROCEDURE — 770002 HCHG ROOM/CARE - OB PRIVATE (112)

## 2024-01-19 PROCEDURE — 700102 HCHG RX REV CODE 250 W/ 637 OVERRIDE(OP): Performed by: OBSTETRICS & GYNECOLOGY

## 2024-01-19 PROCEDURE — A9270 NON-COVERED ITEM OR SERVICE: HCPCS | Performed by: OBSTETRICS & GYNECOLOGY

## 2024-01-19 RX ORDER — PSEUDOEPHEDRINE HCL 30 MG
100 TABLET ORAL 2 TIMES DAILY PRN
Qty: 60 CAPSULE | Refills: 1 | Status: SHIPPED | OUTPATIENT
Start: 2024-01-19

## 2024-01-19 RX ORDER — IBUPROFEN 800 MG/1
800 TABLET ORAL EVERY 8 HOURS
Qty: 60 TABLET | Refills: 1 | Status: SHIPPED | OUTPATIENT
Start: 2024-01-19

## 2024-01-19 RX ORDER — QUETIAPINE FUMARATE 50 MG/1
50-100 TABLET, FILM COATED ORAL NIGHTLY
Status: DISCONTINUED | OUTPATIENT
Start: 2024-01-19 | End: 2024-01-20 | Stop reason: HOSPADM

## 2024-01-19 RX ORDER — HYDROCODONE BITARTRATE AND ACETAMINOPHEN 5; 325 MG/1; MG/1
1-2 TABLET ORAL EVERY 4 HOURS PRN
Qty: 28 TABLET | Refills: 0 | Status: SHIPPED | OUTPATIENT
Start: 2024-01-19 | End: 2024-01-20

## 2024-01-19 RX ADMIN — ACETAMINOPHEN 1000 MG: 500 TABLET ORAL at 00:02

## 2024-01-19 RX ADMIN — HYDROCODONE BITARTRATE AND ACETAMINOPHEN 1 TABLET: 5; 325 TABLET ORAL at 13:36

## 2024-01-19 RX ADMIN — HYDROCODONE BITARTRATE AND ACETAMINOPHEN 2 TABLET: 5; 325 TABLET ORAL at 00:02

## 2024-01-19 RX ADMIN — HYDROCODONE BITARTRATE AND ACETAMINOPHEN 2 TABLET: 5; 325 TABLET ORAL at 05:10

## 2024-01-19 RX ADMIN — QUETIAPINE FUMARATE 50 MG: 100 TABLET ORAL at 22:10

## 2024-01-19 RX ADMIN — IBUPROFEN 800 MG: 800 TABLET, FILM COATED ORAL at 21:49

## 2024-01-19 RX ADMIN — HYDROCODONE BITARTRATE AND ACETAMINOPHEN 1 TABLET: 5; 325 TABLET ORAL at 08:53

## 2024-01-19 RX ADMIN — LEVETIRACETAM 1500 MG: 500 TABLET, FILM COATED ORAL at 05:11

## 2024-01-19 RX ADMIN — HYDROCODONE BITARTRATE AND ACETAMINOPHEN 2 TABLET: 5; 325 TABLET ORAL at 17:43

## 2024-01-19 RX ADMIN — PRENATAL WITH FERROUS FUM AND FOLIC ACID 1 TABLET: 3080; 920; 120; 400; 22; 1.84; 3; 20; 10; 1; 12; 200; 27; 25; 2 TABLET ORAL at 08:53

## 2024-01-19 RX ADMIN — HYDROCODONE BITARTRATE AND ACETAMINOPHEN 1 TABLET: 5; 325 TABLET ORAL at 21:48

## 2024-01-19 RX ADMIN — LEVETIRACETAM 1500 MG: 500 TABLET, FILM COATED ORAL at 17:43

## 2024-01-19 RX ADMIN — IBUPROFEN 800 MG: 800 TABLET, FILM COATED ORAL at 05:11

## 2024-01-19 RX ADMIN — IBUPROFEN 800 MG: 800 TABLET, FILM COATED ORAL at 13:36

## 2024-01-19 ASSESSMENT — PAIN DESCRIPTION - PAIN TYPE
TYPE: ACUTE PAIN
TYPE: SURGICAL PAIN
TYPE: ACUTE PAIN;SURGICAL PAIN
TYPE: SURGICAL PAIN
TYPE: ACUTE PAIN
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
## 2024-01-19 NOTE — CARE PLAN
The patient is Stable - Low risk of patient condition declining or worsening    Shift Goals  Clinical Goals: VSS, rest, pain WDL  Patient Goals: Breast feeding  Family Goals: emotional support    Progress made toward(s) clinical / shift goals:  VSS, resting in between feeds, pain controlled with PRN and scheduled medications      Problem: Knowledge Deficit - Postpartum  Goal: Patient will verbalize and demonstrate understanding of self and infant care  Outcome: Progressing     Problem: Early Mobilization - Post Surgery  Goal: Early mobilization post surgery  Outcome: Progressing

## 2024-01-19 NOTE — CARE PLAN
The patient is Stable - Low risk of patient condition declining or worsening    Shift Goals  Clinical Goals: VS WDL  Patient Goals: Breast feeding  Family Goals: emotional support    Progress made toward(s) clinical / shift goals: VS remains within defined limits. Pt reports adequate pain relief with scheduled and PRN meds.     Patient is not progressing towards the following goals:

## 2024-01-19 NOTE — LACTATION NOTE
This note was copied from a baby's chart.  Mom is a 29 y/o G 20 P2, who delivered baby boy weighing 7 # 1.2 oz at 38.6 wks. Mom has history of seizures and is taking Keppra (L-2), hx of substance abuse and is on Suboxone (L-3) and Seraquel  (L-2)for anx/depression.  Mom declines any lactation assist and says she doing well and is comfortable with positioning and latch.  Mom reports peds is aware of maternal medications she is taking.    POC :   Continue demand feeds of 8 or more times in 24 hours and observe for cues.and adequate output for DOL   [facility] referral sent for mother.

## 2024-01-20 VITALS
OXYGEN SATURATION: 97 % | HEART RATE: 60 BPM | WEIGHT: 194 LBS | TEMPERATURE: 97.8 F | HEIGHT: 66 IN | DIASTOLIC BLOOD PRESSURE: 84 MMHG | BODY MASS INDEX: 31.18 KG/M2 | RESPIRATION RATE: 15 BRPM | SYSTOLIC BLOOD PRESSURE: 132 MMHG

## 2024-01-20 DIAGNOSIS — R52 POSTPARTUM PAIN: ICD-10-CM

## 2024-01-20 PROCEDURE — 700102 HCHG RX REV CODE 250 W/ 637 OVERRIDE(OP): Performed by: OBSTETRICS & GYNECOLOGY

## 2024-01-20 PROCEDURE — 700111 HCHG RX REV CODE 636 W/ 250 OVERRIDE (IP): Performed by: OBSTETRICS & GYNECOLOGY

## 2024-01-20 PROCEDURE — A9270 NON-COVERED ITEM OR SERVICE: HCPCS | Performed by: OBSTETRICS & GYNECOLOGY

## 2024-01-20 RX ORDER — HYDROCODONE BITARTRATE AND ACETAMINOPHEN 5; 325 MG/1; MG/1
1 TABLET ORAL EVERY 6 HOURS PRN
Qty: 28 TABLET | Refills: 0 | Status: SHIPPED | OUTPATIENT
Start: 2024-01-20 | End: 2024-01-27

## 2024-01-20 RX ADMIN — HYDROCODONE BITARTRATE AND ACETAMINOPHEN 1 TABLET: 5; 325 TABLET ORAL at 10:46

## 2024-01-20 RX ADMIN — PRENATAL WITH FERROUS FUM AND FOLIC ACID 1 TABLET: 3080; 920; 120; 400; 22; 1.84; 3; 20; 10; 1; 12; 200; 27; 25; 2 TABLET ORAL at 08:04

## 2024-01-20 RX ADMIN — HYDROCODONE BITARTRATE AND ACETAMINOPHEN 1 TABLET: 5; 325 TABLET ORAL at 02:21

## 2024-01-20 RX ADMIN — IBUPROFEN 800 MG: 800 TABLET, FILM COATED ORAL at 06:30

## 2024-01-20 RX ADMIN — SIMETHICONE 125 MG: 125 TABLET, CHEWABLE ORAL at 10:46

## 2024-01-20 RX ADMIN — LEVETIRACETAM 1500 MG: 500 TABLET, FILM COATED ORAL at 06:30

## 2024-01-20 RX ADMIN — ACETAMINOPHEN 1000 MG: 500 TABLET ORAL at 13:22

## 2024-01-20 RX ADMIN — ONDANSETRON 4 MG: 4 TABLET, ORALLY DISINTEGRATING ORAL at 11:17

## 2024-01-20 RX ADMIN — IBUPROFEN 800 MG: 800 TABLET, FILM COATED ORAL at 15:38

## 2024-01-20 RX ADMIN — HYDROCODONE BITARTRATE AND ACETAMINOPHEN 1 TABLET: 5; 325 TABLET ORAL at 06:30

## 2024-01-20 ASSESSMENT — EDINBURGH POSTNATAL DEPRESSION SCALE (EPDS)
I HAVE FELT SAD OR MISERABLE: NO, NOT AT ALL
I HAVE BEEN ABLE TO LAUGH AND SEE THE FUNNY SIDE OF THINGS: AS MUCH AS I ALWAYS COULD
THE THOUGHT OF HARMING MYSELF HAS OCCURRED TO ME: NEVER
I HAVE BEEN SO UNHAPPY THAT I HAVE HAD DIFFICULTY SLEEPING: NOT AT ALL
I HAVE FELT SCARED OR PANICKY FOR NO GOOD REASON: NO, NOT MUCH
I HAVE BEEN SO UNHAPPY THAT I HAVE BEEN CRYING: NO, NEVER
I HAVE BEEN ANXIOUS OR WORRIED FOR NO GOOD REASON: YES, SOMETIMES
I HAVE BLAMED MYSELF UNNECESSARILY WHEN THINGS WENT WRONG: NO, NEVER
THINGS HAVE BEEN GETTING ON TOP OF ME: NO, MOST OF THE TIME I HAVE COPED QUITE WELL
I HAVE LOOKED FORWARD WITH ENJOYMENT TO THINGS: AS MUCH AS I EVER DID

## 2024-01-20 ASSESSMENT — PATIENT HEALTH QUESTIONNAIRE - PHQ9
2. FEELING DOWN, DEPRESSED, IRRITABLE, OR HOPELESS: NOT AT ALL
SUM OF ALL RESPONSES TO PHQ9 QUESTIONS 1 AND 2: 0
1. LITTLE INTEREST OR PLEASURE IN DOING THINGS: NOT AT ALL

## 2024-01-20 ASSESSMENT — PAIN DESCRIPTION - PAIN TYPE
TYPE: ACUTE PAIN
TYPE: SURGICAL PAIN;ACUTE PAIN
TYPE: SURGICAL PAIN;ACUTE PAIN
TYPE: ACUTE PAIN
TYPE: ACUTE PAIN
TYPE: SURGICAL PAIN;ACUTE PAIN

## 2024-01-20 NOTE — DISCHARGE INSTRUCTIONS
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
## 2024-01-20 NOTE — CARE PLAN
Problem: Knowledge Deficit - Postpartum  Goal: Patient will verbalize and demonstrate understanding of self and infant care  Outcome: Progressing     Problem: Altered Physiologic Condition  Goal: Patient physiologically stable as evidenced by normal lochia, palpable uterine involution and vitals within normal limits  Outcome: Progressing   The patient is Stable - Low risk of patient condition declining or worsening    Shift Goals  Clinical Goals: stable VS and pain control  Patient Goals: care for baby and pain control  Family Goals: support    Progress made toward(s) clinical / shift goals:    Pt reports comfort after pain interventions, Fundus firm and lochia light, VSS, educated on POC, needs met at this time. Questions answered. Will continue to educate.

## 2024-01-20 NOTE — LACTATION NOTE
Zeina reports that she is breastfeeding baby Kodax without difficulty or discomfort. Resources for out-patient support discussed and Zeina is satisfied with information..

## 2024-01-21 NOTE — TELEPHONE ENCOUNTER
Dr Moya' Gab prescription was sent to wrong pharmacy. I re-did the prescription and cancelled the one from Dr. Moya.

## 2024-02-29 ENCOUNTER — HOSPITAL ENCOUNTER (OUTPATIENT)
Facility: MEDICAL CENTER | Age: 29
End: 2024-02-29
Attending: OBSTETRICS & GYNECOLOGY | Admitting: OBSTETRICS & GYNECOLOGY
Payer: MEDICAID

## 2024-02-29 VITALS
DIASTOLIC BLOOD PRESSURE: 80 MMHG | HEART RATE: 74 BPM | WEIGHT: 194 LBS | HEIGHT: 66 IN | RESPIRATION RATE: 16 BRPM | SYSTOLIC BLOOD PRESSURE: 129 MMHG | TEMPERATURE: 97.2 F | BODY MASS INDEX: 31.18 KG/M2 | OXYGEN SATURATION: 95 %

## 2024-02-29 RX ORDER — QUETIAPINE FUMARATE 50 MG/1
50 TABLET, FILM COATED ORAL NIGHTLY
COMMUNITY

## 2024-02-29 ASSESSMENT — PAIN SCALES - GENERAL: PAINLEVEL: 0 - NO PAIN

## 2024-02-29 ASSESSMENT — FIBROSIS 4 INDEX: FIB4 SCORE: 0.73

## 2024-03-01 ENCOUNTER — OFFICE VISIT (OUTPATIENT)
Dept: NEUROLOGY | Facility: MEDICAL CENTER | Age: 29
End: 2024-03-01
Attending: STUDENT IN AN ORGANIZED HEALTH CARE EDUCATION/TRAINING PROGRAM
Payer: MEDICAID

## 2024-03-01 ENCOUNTER — TELEPHONE (OUTPATIENT)
Dept: NEUROLOGY | Facility: MEDICAL CENTER | Age: 29
End: 2024-03-01
Payer: MEDICAID

## 2024-03-01 VITALS
SYSTOLIC BLOOD PRESSURE: 108 MMHG | OXYGEN SATURATION: 97 % | HEART RATE: 67 BPM | WEIGHT: 181 LBS | TEMPERATURE: 97.8 F | RESPIRATION RATE: 18 BRPM | BODY MASS INDEX: 29.09 KG/M2 | HEIGHT: 66 IN | DIASTOLIC BLOOD PRESSURE: 70 MMHG

## 2024-03-01 DIAGNOSIS — G40.909 SEIZURE DISORDER (HCC): ICD-10-CM

## 2024-03-01 DIAGNOSIS — G43.909 ACUTE MIGRAINE: ICD-10-CM

## 2024-03-01 PROCEDURE — 99211 OFF/OP EST MAY X REQ PHY/QHP: CPT | Performed by: STUDENT IN AN ORGANIZED HEALTH CARE EDUCATION/TRAINING PROGRAM

## 2024-03-01 PROCEDURE — 99215 OFFICE O/P EST HI 40 MIN: CPT | Performed by: STUDENT IN AN ORGANIZED HEALTH CARE EDUCATION/TRAINING PROGRAM

## 2024-03-01 RX ORDER — MIDAZOLAM 5 MG/.1ML
5 SPRAY NASAL 2 TIMES DAILY PRN
Qty: 4 EACH | Refills: 0 | Status: SHIPPED | OUTPATIENT
Start: 2024-03-01 | End: 2024-03-04 | Stop reason: SDUPTHER

## 2024-03-01 RX ORDER — TOPIRAMATE 25 MG/1
TABLET ORAL
Qty: 261 TABLET | Refills: 0 | Status: SHIPPED | OUTPATIENT
Start: 2024-03-01 | End: 2024-03-04

## 2024-03-01 RX ORDER — RIZATRIPTAN BENZOATE 10 MG/1
10 TABLET ORAL
Qty: 10 TABLET | Refills: 3 | Status: SHIPPED | OUTPATIENT
Start: 2024-03-01 | End: 2024-03-04 | Stop reason: SDUPTHER

## 2024-03-01 ASSESSMENT — FIBROSIS 4 INDEX: FIB4 SCORE: 0.73

## 2024-03-01 NOTE — TELEPHONE ENCOUNTER
Received Refill PA request via [facility]  for topiramate (TOPAMAX) 25 MG Tablet . (Quantity:261, Day Supply:60)     Insurance: [employer]  Member ID:  [id number]  BIN: [id number]  PCN: [id number]  Group: [id number]     Ran Test claim via [facility]   unable to TC (Payor link issue) NO PA req'd

## 2024-03-01 NOTE — PROGRESS NOTES
"[facility]  Follow up    Patient name: Zeina Davis  YOB: 1995  MRN: [id number]  Date of visit: 3/1/2024        Background:  Zeina Davis is a 28 y.o. 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 Previously seeing Dr. Casillas. Established care here in 2021. She had previously been lost to follow up and has not undergone MRI brain epilepsy protocol or 48-72 hour ambulatory EEG.     Per initial note 4/8/21 by Dr. Casillas:  \"Seizures: weird tase, clammy, hands and feet become clonic, lose awareness, has GTCs. As been hospitalized. Post-ictal confusion incoordination and weakness, 15-30 minutes post ictal. Random times per day. Triggers: not eating, occurs in high anxiety situations, poor sleep. Was occurring once per month up untl she got pregnant 10 months ago. Hasn't had anything in 10 month     Boyfriend thinks he may be \"having absence\" seizure but she thinks she just spaces out.     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      Started when 16. Occurring once per month.\"    Details from previous visits:  She was admitted for breakthrough seizures in the setting of pregnancy 9/12-9/13/2023.  High risk OB had recommended increasing her Keppra to 2000 mg twice daily while she was awaiting to see neurology.  A Keppra level was ordered at the last visit, however it seems it was not completed.    We briefly reviewed her typical seizure semiology.  As described in previous notes, \"weird tase, clammy, hands and feet become clonic, lose awareness, has GTCs. Has been hospitalized. Post-ictal confusion incoordination and weakness, 15-30 minutes post ictal.\" Random times per day. Jaw locked. 7/10 times she is aware they are occurring.      She states she is trying to get her medical marijuana " "card.  She is out of [employer] on bond and needs to have a  approve a medical recommendation for it.  She states that Dr. Casillas had been in support of this, though I do not see this mentioned in the chart.  She states that when she is using medical marijuana, she is seizure-free.    She reports that she is currently having seizure-like episodes 5-10 times per day.  She has not undergone the previously recommended ambulatory EEG or MRI.    Medications were reviewed and updated.  She is no longer taking Wellbutrin, naltrexone, Abilify, Lexapro.  She had discussed stopping these with her psychiatrist, Dr. Fagan at \[facility]\"".  She had been taking Wellbutrin for years    She also reports having a daily headache that is holocranial, which is different from her usual migraines which are only 1 spot.  She used to take Maxalt when she was younger.  Her last migraine was at age 21.         Interval history:   Seizures stopped entirely twice.     She has had 4 seizures in the past 2 weeks. With 3 she knew they were coming so she was able to sit down. Said \"babe\" to  which she did not recall. Her whole body tensed up and she was completely out of it. No injuries, just sore. She hurt her right shoulder, unsure if it was from falling or from being underneath the panel of the .     With the two events that she had an abnormal sensation before, she felt hot/flushed and tasted metal in her mouth. She fell to the ground. Events were unwitnessed but her body felt more sore afterwards.     No missed doses of Keppra.     She is off suboxone fully. She will be going on probation soon. She wants to get her medical marijuana card.     She has a  at home, Divina. Her  and family are supportive in taking care of the baby.      Migraines are sometimes occipital but radiate to the frontal area. Photophobia and phonophobia are present. There is associated nausea and occasionally vomiting. Migraines are present every " other day. Sometimes she will have them for 3 days in a row.       Past Medical History:   Past Medical History:   Diagnosis Date    Epilepsy (HCC) 2024    many, medicated    Psychiatric disorder 2024    bipolar depression, anxiety, ADHD, PTSD , medicated    Migraine 2024    history of    Hepatitis C 2024    resolved on it's own    Dental disorder 2024    full upper and lower dentures    Pregnant 2024    at present    Breath shortness 2024    with exertion at present    Anxiety     Bacterial vaginosis     Depression     Fx clavicle right    PID (pelvic inflammatory disease)     Substance abuse (HCC)        Past Surgical History:   Past Surgical History:   Procedure Laterality Date    REPEAT C SECTOIN WITH SALPINGECTOMY N/A 2024    Procedure: REPEAT  SECTION WITH BILATERAL SALPINGECTOMY AND ANY OTHER MEDICALLY NECESSARY PROCEDURES;  Surgeon: Odilia Marion M.D.;  Location: SURGERY LABOR AND DELIVERY;  Service: Labor and Delivery    PRIMARY C SECTION  2024    times 1 5/10/2021    GYN SURGERY      ovarian cysts    OTHER      toncils, adenoids, appendix    OTHER ABDOMINAL SURGERY      telescoping intestines    TONSILLECTOMY         Social History:   Social History     Socioeconomic History    Marital status: Single     Spouse name: Not on file    Number of children: Not on file    Years of education: Not on file    Highest education level: Not on file   Occupational History    Not on file   Tobacco Use    Smoking status: Former     Current packs/day: 0.00     Average packs/day: 0.5 packs/day for 8.0 years (4.0 ttl pk-yrs)     Types: Cigarettes     Start date:      Quit date:      Years since quittin.1    Smokeless tobacco: Never   Vaping Use    Vaping Use: Every day    Substances: Flavoring    Devices: Disposable   Substance and Sexual Activity    Alcohol use: Not Currently     Comment: stopped 23    Drug use: Not Currently     Types:  Inhaled, Marijuana     Comment: marijuana, stopped 2/9/2023    Sexual activity: Not on file   Other Topics Concern    Not on file   Social History Narrative    Not on file     Social Determinants of Health     Financial Resource Strain: Not on file   Food Insecurity: Not on file   Transportation Needs: Not on file   Physical Activity: Not on file   Stress: Not on file   Social Connections: Not on file   Intimate Partner Violence: Not on file   Housing Stability: Not on file       Family Hx:   Family History   Problem Relation Age of Onset    Bipolar disorder Mother     Depression Mother     Genitourinary () Problems Mother     Heart Attack Mother     Recurrent UTI's Mother     Sexual abuse Mother     Stroke Mother     Alcohol/Drug Father     Anxiety disorder Father     Bipolar disorder Father     Depression Father     Paranoid behavior Father     Psychiatric Illness Father        Current Medications:   Current Outpatient Medications:     rizatriptan (MAXALT) 10 MG tablet, Take 1 Tablet by mouth one time as needed for Migraine for up to 1 dose., Disp: 10 Tablet, Rfl: 3    topiramate (TOPAMAX) 25 MG Tab, Take 1 Tablet by mouth every evening for 7 days, THEN 1 Tablet 2 times a day for 7 days, THEN 2 Tablets 2 times a day for 60 days., Disp: 261 Tablet, Rfl: 0    Midazolam (NAYZILAM) 5 MG/0.1ML Solution, Administer 5 mg into affected nostril(S) 2 times a day as needed (for seizure lasting > 5 minutes) for up to 30 days. SPRAY 5MG INTO AFFECTED NOSTRILS TWICE DAILY AS NEEDED FOR SEIZURE OF ANY KIND OVER(3 MINUTES OR OCURRING FOR BACK TO BACK) 2ND DOSE 15 MINUTES AS NEEDED UP TO 4 DOSES, Disp: 4 Each, Rfl: 0    QUEtiapine (SEROQUEL) 50 MG tablet, Take 50 mg by mouth every evening., Disp: , Rfl:     docusate sodium 100 MG Cap, Take 100 mg by mouth 2 times a day as needed for Constipation., Disp: 60 Capsule, Rfl: 1    ibuprofen (MOTRIN) 800 MG Tab, Take 1 Tablet by mouth every 8 hours., Disp: 60 Tablet, Rfl: 1     levetiracetam (KEPPRA) 750 MG tablet, Take 2 Tablets by mouth 2 times a day for 720 doses. Extra tablet at noon daily, Disp: 360 Tablet, Rfl: 3    Allergies:   Allergies   Allergen Reactions    Other Drug      PT STATES SHE DOESN'T TOLERATE THE SEIZURE MEDS AND HAS TOO MANY SIDE EFFECTS    Morphine Unspecified     Pt reports cardiac arrest with Mother, not patient         Physical Exam:   Ambulatory Vitals  Vitals:    24 1334   BP: 108/70   Pulse: 67   Resp: 18   Temp: 36.6 °C (97.8 °F)   SpO2: 97%       Constitutional: Well-developed, well-nourished, good hygiene. Appears stated age.  Respiratory: normal respiratory effort  Skin: Warm, dry, intact. No rashes observed.  Neurologic:   Mental Status: Awake, alert, oriented x 4.   Speech: Fluent with normal prosody.   Memory: Able to recall recent and remote events accurately.    Concentration: Attentive. Able to focus on history and follow multi-step commands.   Fund of Knowledge: Appropriate.      Studies:      Labs reviewed      Imaging: none, MRI brain and MRV head ordered and not completed    EEG Results: none      Assessment/Plan:   Zeina Davis is a 28 y.o. woman with a history of epilepsy versus psychogenic nonepileptic seizures. With some events she has maintained consciousness throughout which is more suggestive of psychogenic events, however with recent events she has no recollection.     Recommended inpatient EEG monitoring to capture and characterize events, which is not feasible with  and home responsibilities at this time. She is amenable to 72 hour ambulatory EEG and understands the limitation of not having video or being able to withhold medications on an outpatient medication to localize and characterize events. Did advise that her  or family takes a video of any events to bring to next clinic visit if she has another one.     She reports worsening migraines. For antiepileptic and migraine treatment properties, Topamax was  "started. Potential side effects were discussed with the patient. She denies a history of kidney stones. Plan for slow uptitration. Maxalt prescribed as an abortive.     She should also continue Keppra 1500 mg BID. She was advised to contact the clinic if she has any further events concerning for seizures. Nayzilam refill provided for rescue.     Patient instructions:  - For headaches and seizures, start Topamax 25 mg nightly x 1 week, increase to 25 mg twice daily x 1 week, increase to 50 mg twice daily thereafter    -For acute migraine, can take Maxalt 1 tablet (or 1/2) daily as needed up to twice in 24 hours for acute migraine    -Continue Keppra 1500 mg twice daily    -Plan for 72 hour \"take home\" EEG          Please let our office know if you have any changes in your seizure frequency and/characteristics. Otherwise, please keep the diary of your events and bring it with you at the time of your next follow up visit with our office.     Please take vitamin D3 [id number] internation units daily.     Please take folic acid 1 mg daily. This is an over-the-counter supplement that is recommended to prevent certain developmental problems in your baby, in case you become pregnant in the future.    Please let our office know as soon as you become pregnant or plan to become pregnant.    If you are caring for a baby/young child, please make sure to be sitting on a soft surface while holding your baby/young child, so in case you have a seizure, your baby/young child is not injured due to fall.     Please let us know if you observe that your baby is excessively sleepy/has other changes and the pediatrician feels that there are no other explanations except possible adverse effects of antiseizure medication(s) your are taking while nursing your baby.     Please note that the following might precipitate seizures: missed doses of antiseizure medications, being sick with fever, stress, fatigue, sleep deprivation, not eating " regularly, not drinking enough water, drinking too much alcohol, stopping alcohol suddenly if you are currently using it on a regular/daily basis, and/or using recreational drugs, among others.    Please note that the following might lead to an injury, potentially a life-threatening injury, in case you have a seizure and/or lose awareness while:   - being in a large body of water by yourself, such as bath, pool, lake, ocean, among others (risk of drowning)   - being on unprotected heights (risk of fall)   - being around and/or operating heavy machinery (risk of injury)   - being around open fire/hot surfaces (risk of burns)   - any other activities/circumstances, in which if you lose awareness, you might injure yourself and/or others.    Please call for help (crisis line and/or 911) in case you have thoughts of harming yourself and/or others.    Please abstain from driving until further notice.    ------------------------------------------------------------------------------------------  Instructions for your family/caregivers:  Please call 911 if the patient has a seizure longer than 2-3 minutes, if seizures are back to back without her recovering to her baseline, or she does not start recovering within 5-10 minutes after the seizure stops. During the seizure - please turn her on her side, please make sure her head is protected (for example, you should put a pillow under her head, if one is available), and please do not put anything in her mouth.   -------------------------------------------------------------------------------------------    It is important that your seizures are well controlled and you have none or have them rarely. In addition to avoiding injury related to breakthrough seizures, frequent seizures increase risk of SUDEP (sudden unexpected death in epilepsy), where a person goes into a seizure and then never wakes up - this is a rare complication of seizure disorder; one of the best ways to prevent  it is to control your seizures well.     Due to the high volume of patients we are trying to help, your physician will not be able to respond by phone or in MyChart to your routine concerns between appointments.  This does not reflect a lack of interest or concern for you or your diagnosis.  Please bring these questions and concerns to your appointment where your physician can answer.  Please relay more pressing concerns to our office, either via MyChart, or by phone; if not able to reach us please visit nearby Urgent Care Center or Emergency Department.  If any emergent medical needs, please seek emergent medical help and/or call 911.    Please note that we are not able to fill out paperwork that might be related to your work, utility company, disability, and/or driving, among others, in between the visits.  Please schedule a dedicated appointment to address your paperwork, so we can do that in a timely manner.  This is not due to lack of concern or interest for your disease-related work/administrative problems, but to make sure that we provide the best possible care and to fill out your paperwork in a correct and timely manner.    Thank you for entrusting your neurological care to [facility] and we look forward to continuing to serve you.           Follow up in 8 weeks.     Alice Hickey M.D.   Diplomate, Neurology with Special Qualification in Epilepsy, American Board of Psychiatry and Neurology   of Clinical Neurology, [facility] [employer]  Level III Epilepsy Center, Department of Neurology at [facility]         During today's encounter we discussed available treatment options and their individual side effect profiles. Total encounter time caring for patient today 40 minutes.

## 2024-03-01 NOTE — LETTER
3/1/2024    To whom it may concern,     Zeina Davis is undergoing workup for active seizures of unclear cause. She is taking anti-seizure medication and a second agent was added. Thus far she is not seizure free and we are trying all avenues to try to control the seizures, and to identify what kind of seizures she is having.     Alice Melvin M.D.   Diplomate, Neurology with Special Qualification in Epilepsy, American Board of Psychiatry and Neurology   of Clinical Neurology, [facility] [employer]  Level III Epilepsy Center, Department of Neurology at [facility]   3/1/2024

## 2024-03-01 NOTE — PROGRESS NOTES
- Patient arrived to labor and delivery for vaginal bleeding. Patient is a  and delivered on 24 via c/s. Patient states she had some bleeding start at 1800 on  and it filled 2 pads and an adult diaper. Patient currently states the bleeding has stopped. Vitals obtained. MFTI filed.     - Phone call to Dr. Li. Relayed vitals and patient's current status of bleeding. Received orders for a CBC and transvaginal ultrasound.     7- Patient states she needs to leave right now as her infant is at home with the FOB and there is no formula at home. Patient states she brought the only can of formula to the hospital and her infant is at home crying. Patient would like to leave AMA. This RN encouraged patient to stay to ensure bleeding is not severe and that additional testing or meds are not needed. Patient verbalized understanding but expresses wanting to leave and will not wait for provider to come to bedside.     9- Dr. Li notified that patient left AMA.

## 2024-03-01 NOTE — TELEPHONE ENCOUNTER
Received Refill PA request via [facility]  for rizatriptan (MAXALT) 10 MG tablet . (Quantity:10, Day Supply:30)     Insurance: [employer]  Member ID:  [id number]  BIN: [id number]  PCN: [id number]  Group: [id number]     Ran Test claim via [place] & medication  unable to TC (Payor link issue) NO PA req'd

## 2024-03-01 NOTE — PATIENT INSTRUCTIONS
"- For headaches and seizures, start Topamax 25 mg nightly x 1 week, increase to 25 mg twice daily x 1 week, increase to 50 mg twice daily thereafter    -For acute migraine, can take Maxalt 1 tablet (or 1/2) daily as needed up to twice in 24 hours for acute migraine    -Continue Keppra 1500 mg twice daily    -Plan for 72 hour \"take home\" EEG          Please let our office know if you have any changes in your seizure frequency and/characteristics. Otherwise, please keep the diary of your events and bring it with you at the time of your next follow up visit with our office.     Please take vitamin D3 [id number] internation units daily.     Please take folic acid 1 mg daily. This is an over-the-counter supplement that is recommended to prevent certain developmental problems in your baby, in case you become pregnant in the future.    Please let our office know as soon as you become pregnant or plan to become pregnant.    If you are caring for a baby/young child, please make sure to be sitting on a soft surface while holding your baby/young child, so in case you have a seizure, your baby/young child is not injured due to fall.     Please let us know if you observe that your baby is excessively sleepy/has other changes and the pediatrician feels that there are no other explanations except possible adverse effects of antiseizure medication(s) your are taking while nursing your baby.     Please note that the following might precipitate seizures: missed doses of antiseizure medications, being sick with fever, stress, fatigue, sleep deprivation, not eating regularly, not drinking enough water, drinking too much alcohol, stopping alcohol suddenly if you are currently using it on a regular/daily basis, and/or using recreational drugs, among others.    Please note that the following might lead to an injury, potentially a life-threatening injury, in case you have a seizure and/or lose awareness while:   - being in a large body of " water by yourself, such as bath, pool, lake, ocean, among others (risk of drowning)   - being on unprotected heights (risk of fall)   - being around and/or operating heavy machinery (risk of injury)   - being around open fire/hot surfaces (risk of burns)   - any other activities/circumstances, in which if you lose awareness, you might injure yourself and/or others.    Please call for help (crisis line and/or 911) in case you have thoughts of harming yourself and/or others.    Please abstain from driving until further notice.    ------------------------------------------------------------------------------------------  Instructions for your family/caregivers:  Please call 911 if the patient has a seizure longer than 2-3 minutes, if seizures are back to back without her recovering to her baseline, or she does not start recovering within 5-10 minutes after the seizure stops. During the seizure - please turn her on her side, please make sure her head is protected (for example, you should put a pillow under her head, if one is available), and please do not put anything in her mouth.   -------------------------------------------------------------------------------------------    It is important that your seizures are well controlled and you have none or have them rarely. In addition to avoiding injury related to breakthrough seizures, frequent seizures increase risk of SUDEP (sudden unexpected death in epilepsy), where a person goes into a seizure and then never wakes up - this is a rare complication of seizure disorder; one of the best ways to prevent it is to control your seizures well.     Due to the high volume of patients we are trying to help, your physician will not be able to respond by phone or in MyChart to your routine concerns between appointments.  This does not reflect a lack of interest or concern for you or your diagnosis.  Please bring these questions and concerns to your appointment where your physician  can answer.  Please relay more pressing concerns to our office, either via MyChart, or by phone; if not able to reach us please visit nearby Urgent Care Center or Emergency Department.  If any emergent medical needs, please seek emergent medical help and/or call 911.    Please note that we are not able to fill out paperwork that might be related to your work, utility company, disability, and/or driving, among others, in between the visits.  Please schedule a dedicated appointment to address your paperwork, so we can do that in a timely manner.  This is not due to lack of concern or interest for your disease-related work/administrative problems, but to make sure that we provide the best possible care and to fill out your paperwork in a correct and timely manner.    Thank you for entrusting your neurological care to [facility] and we look forward to continuing to serve you.

## 2024-03-01 NOTE — TELEPHONE ENCOUNTER
Prior Authorization for MIDAZOLAM (NAYZILAM) 5 MG/0.1ML SOLUTION (Quantity: 4, Days: 30) has been submitted via Cover My Meds: Key ([id number])    Insurance: health plan [facility]     Will follow up in 24-48 business hours.

## 2024-03-04 DIAGNOSIS — G43.909 ACUTE MIGRAINE: ICD-10-CM

## 2024-03-04 DIAGNOSIS — G40.909 SEIZURE DISORDER (HCC): ICD-10-CM

## 2024-03-04 RX ORDER — RIZATRIPTAN BENZOATE 10 MG/1
10 TABLET ORAL
Qty: 10 TABLET | Refills: 3 | Status: SHIPPED | OUTPATIENT
Start: 2024-03-04

## 2024-03-04 RX ORDER — MIDAZOLAM 5 MG/.1ML
5 SPRAY NASAL 2 TIMES DAILY PRN
Qty: 4 EACH | Refills: 0 | Status: SHIPPED | OUTPATIENT
Start: 2024-03-04 | End: 2024-04-03

## 2024-03-04 RX ORDER — TOPIRAMATE 25 MG/1
TABLET ORAL
Qty: 261 TABLET | Refills: 0 | Status: SHIPPED | OUTPATIENT
Start: 2024-03-04 | End: 2024-05-16

## 2024-03-04 NOTE — TELEPHONE ENCOUNTER
Prior Authorization for MIDAZOLAM (NAYZILAM) 5 MG/0.1ML SOLUTION has been approved for a quantity of 4 , day supply 30    Prior Authorization reference number: [id number]  Insurance: [employer]  Effective dates: 03/01/2024 THRU 12/31/2024  Copay: $NA     Is patient eligible to fill with [facility]? Yes    Next Steps: APPROVED THRU 12/31/2024. UNABLE TO RUN TEST CLAIM//PAYOR LINK DOWN

## 2024-03-04 NOTE — TELEPHONE ENCOUNTER
Received notification from KIRAN Emmanuel that pharmacy did not receive any of the prescriptions that were sent on Friday 3/1/2024. It is not clear why they were discontinued. Scripts sent to pharmacy once again.

## 2024-03-08 ENCOUNTER — TELEPHONE (OUTPATIENT)
Dept: NEUROLOGY | Facility: MEDICAL CENTER | Age: 29
End: 2024-03-08
Payer: MEDICAID

## 2024-03-08 NOTE — TELEPHONE ENCOUNTER
Spoke with pharmacy and patient has picked up her medications.     ----- Message from Alice Hickey M.D. sent at 3/4/2024  3:19 PM PST -----  Michael Emmanuel pls see my note and if there are any further issues please call the pharmacy and ask why they are discontinuing my orders. Thanks  ----- Message -----  From: Bhavesh Edwards Ass't  Sent: 3/4/2024   3:09 PM PST  To: Alice Hickey M.D.    Patient called to say that when she saw you last week....you prescribed 3 medications none of which reached her pharmacy. They were:    Rizatriptan  Topiramate  Nazilam    Can you please resend them to her pharmacy? I double checked the pharmacy and it was correct. The pharmacy just didn't receive them.     Thanks  Lien

## 2024-04-08 ENCOUNTER — APPOINTMENT (OUTPATIENT)
Dept: NEUROLOGY | Facility: MEDICAL CENTER | Age: 29
End: 2024-04-08
Attending: STUDENT IN AN ORGANIZED HEALTH CARE EDUCATION/TRAINING PROGRAM
Payer: MEDICAID

## 2024-04-09 ENCOUNTER — APPOINTMENT (OUTPATIENT)
Dept: NEUROLOGY | Facility: MEDICAL CENTER | Age: 29
End: 2024-04-09
Attending: STUDENT IN AN ORGANIZED HEALTH CARE EDUCATION/TRAINING PROGRAM
Payer: MEDICAID

## 2024-05-07 ENCOUNTER — TELEPHONE (OUTPATIENT)
Dept: HEALTH INFORMATION MANAGEMENT | Facility: OTHER | Age: 29
End: 2024-05-07
Payer: MEDICAID

## 2025-04-14 NOTE — PROGRESS NOTES
"45-62\" abdominal binder sent to tube station 31    Contact traction for any questions or concerns.   "
"POD #1 s/p Repeat LTCS and bilateral salpingectomy    S: Doing ok but pain is not well controlled.  She has ambulated but attempted to void and felt that she was in too much pain.  Catheter was removed.  She has moderate lochia.  She denies nausea/vomiting/fevers/chills/night sweats.   She is working on breast feeding.      O:  BP 97/58   Pulse (!) 49   Temp 36.1 °C (97 °F) (Temporal)   Resp 18   Ht 1.676 m (5' 6\")   Wt 88 kg (194 lb)   SpO2 94%         Intake/Output Summary (Last 24 hours) at 1/18/2024 0705  Last data filed at 1/18/2024 0110  Gross per 24 hour   Intake 1650 ml   Output 1300 ml   Net 350 ml       A, A, and O x 3 NAD    Incision: clean/dry/intact.  Mepilex dressing in place.    No c/e/e    Abdomen is soft and moderately tender to palpation.    Recent Labs     01/17/24  1115   WBC 9.5   RBC 3.93*   HEMOGLOBIN 11.5*   HEMATOCRIT 34.0*   MCV 86.5   MCH 29.3   RDW 40.4   PLATELETCT 217   MPV 10.7   NEUTSPOLYS 69.80   LYMPHOCYTES 23.40   MONOCYTES 5.60   EOSINOPHILS 0.50   BASOPHILS 0.10     CBC from this morning is pending.    A/P: POD #1 s/p RLTCS with bilateral salpingectomy.  History of polysubstance abuse and narcotic addiction on suboxone (low dose) as well as bipolar disorder on Abilify and seizure disorder on Keppra.     Ambulate TID.  ADAT.  Work on breast feeding.  Work on pain management.  Continue home medications.  "
0633-Report received from KORY Langford.     0855-Assessment per flowsheet completed. Pt reported pain of 9/10 and medicated per MAR. Reviewed POC with pt; questions answered. Pt was encouraged to call with needs or concerns.    1305-Received an order via telephone from Dr. Li to cancel pt's discharge order.    8205-Report given to KORY Dc. Relinquished care at this time.  
0700- Received report from KORY Ariza. Assumed care. 12 hour chart check, MAR and orders reviewed.      0800- Assessment complete. Fundus firm and palpable, lochia scant to light rubra. Pain management and interventions discussed with pt. POC discussed. All questions and concerns discussed. No further concerns.  
0800-Assessment done. Vital signs stable. Patient voiding without difficulty, claims to be passing flatus. Fundus firm at umbilicus with light lochia. Island silver over low abdominal incision old drainage present, dry, and intact. No redness, swelling, or drainage noted. Patient ambulating with steady gait. Patient claims to have good pain relief with p.o meds. Breastfeeding infant on demand. Family at bedside. Patient encouraged to call for any needs. Will continue to monitor.    1144- Notified MD of lab attempts at collecting postpartum CBC without success. Per MD no need to try again.  
1030 - Pt is a  at 38w6 days here for Repeat  Section with Bilateral Salpingectomy. Pt denies LOF/ VB/ pain. Pt reports feeling +FM and mild contractions.  1213 - Pt in OR.  1240 - Viable baby boy delivered by Dr. Li. APGARs 8/9.  1313 - Pt in PACU. VS stable. No complaints of pain at this time. Infant at warmer. FOB at BS.  1430 - Pt transferred to , infant in arms, pt mother at side. Report given to KORY Nur. Pt has no questions or concerns at this time.  
Abdominal binder has been sent to statin # 31.  
Assessment completed. Fundus firm, lochia light. VSS. Tolerating diet. Espino in place, dressing with scant drainage. Pt states passing gas. Pain controlled with PRN medications per MAR. Will offer pain medications as they become available. FOB at bedside, bonding with patient and baby. POC discussed with patient. Encouraged to call with needs, Call light within reach.     0000 -- Lab attempted to draw CBC at 0000, one first poke no blood was returned and patient refused to let phlebotomist try again. MD notified, CBC rescheduled for 1400  
Assessment completed. Fundus firm, lochia light. VSS. Tolerating diet. Voiding without difficulty, incision dressing CDI. Pt states passing gas. Pain controlled with PRN medications per MAR. Will offer pain medications as they become available. FOB at bedside, bonding with patient and baby. POC discussed with patient. Encouraged to call with needs, Call light within reach.   
Assumed care received report from Donita HORN.  
Discharge paperwork for mom discussed at bedside. All questions answered. Follow-up appointments reviewed.  Paperwork signed and dated at this time. MOB will room-in, in room 313 as infant is on jose j scoring for another 2 days. MOB verbalized understanding.   
POD 3---  , salpingectomy    Pt stayed another day so baby could be monitored for N.A.S., able to go home today.    UO adequate  + flatus, tolerating regular diet well, denies N/V    LAB:       24 15:36 24 11:15   WBC 8.6 9.5   Hemoglobin 11.6 (L) 11.5 (L)   Hematocrit 34.1 (L) 34.0 (L)   Platelet Count 225 217       PE:     Afebrile  BP normal    Lungs clear to auscultation  Abdomen soft, flat, bowel sounds present and normal  Wound dressing in place, waterproof barrier intact  Calves nontender, Angel Luis sign negative bilaterally  Back:  No CVA tenderness     PLAN:     Keppra, Seroquel.  Postop care, analgesia as needed, diet as tolerated,  activity as tolerated. Patient planning on discharge:  today .       
Patient arrived to [facility] via gurney. Patient transferred to  bed via roller board. Report received from KORY Dowell. Patient oriented to room, call light, emergency cords, and unit policies. Patient verbalizes understanding.   
Report received from KORY Hudson. Assume care of pt  
hide

## (undated) DEVICE — GLOVE BIOGEL SZ 7 SURGICAL PF LTX - (50PR/BX 4BX/CA)

## (undated) DEVICE — ELECTRODE DUAL RETURN W/ CORD - (50/PK)

## (undated) DEVICE — DRESSING POST OP BORDER 4 X 10 (5EA/BX)

## (undated) DEVICE — TAPE CLOTH MEDIPORE 6 INCH - (12RL/CA)

## (undated) DEVICE — SUTURE 0 VICRYL PLUS CT-1 - 36 INCH (36/BX)

## (undated) DEVICE — KIT  I.V. START (100EA/CA)

## (undated) DEVICE — HEAD HOLDER JUNIOR/ADULT

## (undated) DEVICE — CANISTER SUCTION 3000ML MECHANICAL FILTER AUTO SHUTOFF MEDI-VAC NONSTERILE LF DISP  (40EA/CA)

## (undated) DEVICE — GLOVE BIOGEL SZ 6.5 SURGICAL PF LTX (50PR/BX 4BX/CA)

## (undated) DEVICE — SET EXTENSION WITH 2 PORTS (48EA/CA) ***PART #2C8610 IS A SUBSTITUTE*****

## (undated) DEVICE — CLOTH PATIENT SKIN PREP 2% (192EA/CA)

## (undated) DEVICE — SUTURE 3-0 VICRYL PLUS CT-1 - 36 INCH (36/BX)

## (undated) DEVICE — TUBING CLEARLINK DUO-VENT - C-FLO (48EA/CA)

## (undated) DEVICE — BLANKET UNDERBODY FULL ACCES - (5/CA)

## (undated) DEVICE — CHLORAPREP 26 ML APPLICATOR - ORANGE TINT(25/CA)

## (undated) DEVICE — PACK C-SECTION (2EA/CA)

## (undated) DEVICE — LIGASURE SM JAW SEALER CRVD - (6EA/CA)

## (undated) DEVICE — BAG SPONGE COUNT 10.25 X 32 - BLUE (250/CA)

## (undated) DEVICE — SUTURE 2-0 CHROMIC GUT CT-1 27 (36PK/BX)"

## (undated) DEVICE — STAPLER SKIN DISP - (6/BX 10BX/CA) VISISTAT

## (undated) DEVICE — WATER IRRIGATION STERILE 1000ML (12EA/CA)

## (undated) DEVICE — SUTURE 0 36IN PDS + VIO CT-1 (36PK/BX)

## (undated) DEVICE — BLANKET UNDERBODY ADULT - (10/CA)

## (undated) DEVICE — PLUMEPEN ULTRA 3/8 IN X 10 FT HOSE (20EA/CA)

## (undated) DEVICE — SUTURE3-0 36IN VCRLY PLS ANTI (36PK/BX)

## (undated) DEVICE — SLEEVE, SEQUENTIAL CALF REG

## (undated) DEVICE — BLANKET STERILE CHICKIE FOR L&D (100/CA)

## (undated) DEVICE — SOLUTION PLASMA-LYTE PH 7.4 INJ 1000ML  (14EA/CA)

## (undated) DEVICE — CATHETER IV NON-SAFETY 18 GA X 1 1/4 (50/BX 4BX/CA)

## (undated) DEVICE — CANNULA O2 COMFORT SOFT EAR ADULT 7 FT TUBING (50/CA)

## (undated) DEVICE — TRAY SPINAL ANESTHESIA NON-SAFETY (10/CA)